# Patient Record
Sex: MALE | Race: WHITE | NOT HISPANIC OR LATINO | URBAN - METROPOLITAN AREA
[De-identification: names, ages, dates, MRNs, and addresses within clinical notes are randomized per-mention and may not be internally consistent; named-entity substitution may affect disease eponyms.]

---

## 2023-02-21 PROBLEM — F32.9 MAJOR DEPRESSION: Status: ACTIVE | Noted: 2023-02-21

## 2023-02-21 PROBLEM — I49.9 ARRHYTHMIA: Status: ACTIVE | Noted: 2023-02-21

## 2023-02-21 PROBLEM — I47.19 AVNRT (AV NODAL RE-ENTRY TACHYCARDIA) (CMS-HCC): Status: ACTIVE | Noted: 2023-02-21

## 2023-02-21 PROBLEM — R07.9 CHEST PAIN: Status: ACTIVE | Noted: 2023-02-21

## 2023-02-21 PROBLEM — E21.3 HYPERPARATHYROIDISM (MULTI): Status: ACTIVE | Noted: 2023-02-21

## 2023-02-21 PROBLEM — E83.52 HIGH CALCIUM LEVELS: Status: ACTIVE | Noted: 2023-02-21

## 2023-02-21 PROBLEM — E66.9 OBESITY: Status: ACTIVE | Noted: 2023-02-21

## 2023-02-21 PROBLEM — I50.20 SYSTOLIC HEART FAILURE (MULTI): Status: ACTIVE | Noted: 2023-02-21

## 2023-02-21 RX ORDER — ALENDRONATE SODIUM 70 MG/1
70 TABLET ORAL
COMMUNITY
End: 2023-04-05 | Stop reason: SDUPTHER

## 2023-02-21 RX ORDER — VENLAFAXINE HYDROCHLORIDE 150 MG/1
1 CAPSULE, EXTENDED RELEASE ORAL DAILY
COMMUNITY
Start: 2022-10-23 | End: 2023-04-05 | Stop reason: ALTCHOICE

## 2023-02-21 RX ORDER — ASPIRIN 81 MG/1
1 TABLET ORAL DAILY
Status: ON HOLD | COMMUNITY
Start: 2022-10-07 | End: 2023-11-06

## 2023-02-21 RX ORDER — ACETAMINOPHEN 500 MG
1 TABLET ORAL DAILY
COMMUNITY
Start: 2022-11-22

## 2023-02-21 RX ORDER — METOPROLOL TARTRATE 25 MG/1
1 TABLET, FILM COATED ORAL 2 TIMES DAILY
Status: ON HOLD | COMMUNITY
Start: 2022-10-07 | End: 2023-11-06

## 2023-02-21 RX ORDER — VENLAFAXINE HYDROCHLORIDE 75 MG/1
CAPSULE, EXTENDED RELEASE ORAL
COMMUNITY
Start: 2022-10-23 | End: 2023-04-05 | Stop reason: SDUPTHER

## 2023-02-21 RX ORDER — SACUBITRIL AND VALSARTAN 24; 26 MG/1; MG/1
1 TABLET, FILM COATED ORAL 2 TIMES DAILY
COMMUNITY
Start: 2022-10-27 | End: 2023-10-04

## 2023-04-05 ENCOUNTER — OFFICE VISIT (OUTPATIENT)
Dept: PRIMARY CARE | Facility: CLINIC | Age: 63
End: 2023-04-05
Payer: COMMERCIAL

## 2023-04-05 VITALS
SYSTOLIC BLOOD PRESSURE: 147 MMHG | BODY MASS INDEX: 39.25 KG/M2 | WEIGHT: 314 LBS | DIASTOLIC BLOOD PRESSURE: 86 MMHG | TEMPERATURE: 97.5 F | HEART RATE: 105 BPM

## 2023-04-05 DIAGNOSIS — E21.3 HYPERPARATHYROIDISM (MULTI): Primary | ICD-10-CM

## 2023-04-05 DIAGNOSIS — F32.5 MAJOR DEPRESSIVE DISORDER WITH SINGLE EPISODE, IN FULL REMISSION (CMS-HCC): ICD-10-CM

## 2023-04-05 PROCEDURE — 1036F TOBACCO NON-USER: CPT | Performed by: INTERNAL MEDICINE

## 2023-04-05 PROCEDURE — 99213 OFFICE O/P EST LOW 20 MIN: CPT | Performed by: INTERNAL MEDICINE

## 2023-04-05 RX ORDER — ALENDRONATE SODIUM 10 MG/1
10 TABLET ORAL DAILY
Qty: 90 TABLET | Refills: 3 | Status: SHIPPED | OUTPATIENT
Start: 2023-04-05 | End: 2024-04-11 | Stop reason: SDUPTHER

## 2023-04-05 RX ORDER — VENLAFAXINE HYDROCHLORIDE 75 MG/1
75 CAPSULE, EXTENDED RELEASE ORAL DAILY
Qty: 90 CAPSULE | Refills: 3 | Status: ON HOLD | OUTPATIENT
Start: 2023-04-05 | End: 2023-11-06

## 2023-04-05 RX ORDER — VENLAFAXINE HYDROCHLORIDE 150 MG/1
150 CAPSULE, EXTENDED RELEASE ORAL DAILY
Qty: 90 CAPSULE | Refills: 3 | Status: ON HOLD | OUTPATIENT
Start: 2023-04-05 | End: 2023-11-06

## 2023-04-05 ASSESSMENT — ENCOUNTER SYMPTOMS
DEPRESSED MOOD: 0
FEVER: 0
NERVOUS/ANXIOUS: 0
POLYDIPSIA: 0
COUGH: 0
DECREASED CONCENTRATION: 0
CHILLS: 0
PALPITATIONS: 0
HYPERSOMNIA: 0
ANHEDONIA: 0
SLEEP QUALITY: GOOD
SHORTNESS OF BREATH: 0
DEPRESSION: 1

## 2023-04-05 NOTE — PROGRESS NOTES
Subjective   Patient ID: Parviz Barfield is a 62 y.o. male who presents for No chief complaint on file..    Admits to missing doses of alendronate because of weekly dosing. Would like to switch to daily for compliance. Recent testing reviewed. Calcium levels normal currently. Well controlled hyperparathyroid.     Depression well controlled.     Cardiac ablation completed for AVNRT.         Depression  Visit Type: follow-up  Patient is not experiencing: anhedonia, decreased concentration, depressed mood, hypersomnia, nervousness/anxiety, palpitations and shortness of breath.    Sleep quality: good  Nighttime awakenings: none  Compliance with medications:  %         Review of Systems   Constitutional:  Negative for chills and fever.   Respiratory:  Negative for cough and shortness of breath.    Cardiovascular:  Negative for chest pain and palpitations.   Endocrine: Negative for polydipsia and polyuria.   Psychiatric/Behavioral:  Positive for depression. Negative for decreased concentration. The patient is not nervous/anxious.        Objective   /86 (BP Location: Left arm, Patient Position: Sitting)   Pulse 105   Temp 36.4 °C (97.5 °F)   Wt (!) 142 kg (314 lb)   BMI 39.25 kg/m²     Physical Exam  Constitutional:       Appearance: Normal appearance.   HENT:      Head: Normocephalic and atraumatic.   Eyes:      Extraocular Movements: Extraocular movements intact.      Pupils: Pupils are equal, round, and reactive to light.   Neck:      Thyroid: No thyroid mass or thyromegaly.      Vascular: No carotid bruit.   Cardiovascular:      Rate and Rhythm: Normal rate and regular rhythm.      Heart sounds: No murmur heard.     No friction rub. No gallop.   Pulmonary:      Effort: No respiratory distress.      Breath sounds: No wheezing, rhonchi or rales.   Musculoskeletal:      Cervical back: Neck supple.      Right lower leg: No edema.      Left lower leg: No edema.   Lymphadenopathy:      Cervical: No cervical  adenopathy.   Neurological:      Mental Status: He is alert.         Assessment/Plan

## 2023-10-04 DIAGNOSIS — I50.22 CHRONIC SYSTOLIC HEART FAILURE (MULTI): Primary | ICD-10-CM

## 2023-10-04 RX ORDER — SACUBITRIL AND VALSARTAN 24; 26 MG/1; MG/1
1 TABLET, FILM COATED ORAL 2 TIMES DAILY
Qty: 60 TABLET | Refills: 6 | Status: SHIPPED | OUTPATIENT
Start: 2023-10-04

## 2023-10-05 ASSESSMENT — PROMIS GLOBAL HEALTH SCALE
EMOTIONAL_PROBLEMS: ALWAYS
RATE_QUALITY_OF_LIFE: GOOD
RATE_AVERAGE_FATIGUE: MODERATE
RATE_AVERAGE_PAIN: 9
CARRYOUT_SOCIAL_ACTIVITIES: FAIR
RATE_MENTAL_HEALTH: FAIR
CARRYOUT_PHYSICAL_ACTIVITIES: MODERATELY
RATE_GENERAL_HEALTH: GOOD
RATE_SOCIAL_SATISFACTION: POOR
RATE_PHYSICAL_HEALTH: FAIR

## 2023-10-06 ENCOUNTER — OFFICE VISIT (OUTPATIENT)
Dept: PRIMARY CARE | Facility: CLINIC | Age: 63
End: 2023-10-06
Payer: COMMERCIAL

## 2023-10-06 VITALS
SYSTOLIC BLOOD PRESSURE: 123 MMHG | DIASTOLIC BLOOD PRESSURE: 81 MMHG | HEIGHT: 75 IN | WEIGHT: 308.2 LBS | TEMPERATURE: 96.6 F | BODY MASS INDEX: 38.32 KG/M2 | HEART RATE: 71 BPM

## 2023-10-06 DIAGNOSIS — Z00.00 ANNUAL PHYSICAL EXAM: Primary | ICD-10-CM

## 2023-10-06 DIAGNOSIS — F32.5 MAJOR DEPRESSION IN REMISSION (CMS-HCC): ICD-10-CM

## 2023-10-06 DIAGNOSIS — R23.2 ABNORMAL FLUSHING AND SWEATING: ICD-10-CM

## 2023-10-06 DIAGNOSIS — I50.22 CHRONIC SYSTOLIC HEART FAILURE (MULTI): ICD-10-CM

## 2023-10-06 DIAGNOSIS — Z12.11 ENCOUNTER FOR SCREENING FOR MALIGNANT NEOPLASM OF COLON: ICD-10-CM

## 2023-10-06 DIAGNOSIS — E21.3 HYPERPARATHYROIDISM (MULTI): ICD-10-CM

## 2023-10-06 DIAGNOSIS — I10 HTN (HYPERTENSION), BENIGN: ICD-10-CM

## 2023-10-06 DIAGNOSIS — R61 ABNORMAL FLUSHING AND SWEATING: ICD-10-CM

## 2023-10-06 DIAGNOSIS — Z12.5 SCREENING FOR PROSTATE CANCER: ICD-10-CM

## 2023-10-06 PROBLEM — E78.5 HYPERLIPIDEMIA LDL GOAL <130: Status: ACTIVE | Noted: 2023-10-06

## 2023-10-06 PROBLEM — I51.9 HEART DISEASE: Status: ACTIVE | Noted: 2023-02-21

## 2023-10-06 PROBLEM — G89.29 OTHER CHRONIC PAIN: Status: ACTIVE | Noted: 2023-10-06

## 2023-10-06 PROBLEM — E55.9 VITAMIN D DEFICIENCY: Status: ACTIVE | Noted: 2023-10-06

## 2023-10-06 PROCEDURE — 3074F SYST BP LT 130 MM HG: CPT | Performed by: INTERNAL MEDICINE

## 2023-10-06 PROCEDURE — 1036F TOBACCO NON-USER: CPT | Performed by: INTERNAL MEDICINE

## 2023-10-06 PROCEDURE — 99396 PREV VISIT EST AGE 40-64: CPT | Performed by: INTERNAL MEDICINE

## 2023-10-06 PROCEDURE — 3079F DIAST BP 80-89 MM HG: CPT | Performed by: INTERNAL MEDICINE

## 2023-10-06 ASSESSMENT — ENCOUNTER SYMPTOMS
COUGH: 0
FEVER: 0
CHILLS: 0
SHORTNESS OF BREATH: 0
PALPITATIONS: 0
POLYDIPSIA: 0

## 2023-10-06 ASSESSMENT — PATIENT HEALTH QUESTIONNAIRE - PHQ9
2. FEELING DOWN, DEPRESSED OR HOPELESS: NEARLY EVERY DAY
5. POOR APPETITE OR OVEREATING: NOT AT ALL
9. THOUGHTS THAT YOU WOULD BE BETTER OFF DEAD, OR OF HURTING YOURSELF: SEVERAL DAYS
6. FEELING BAD ABOUT YOURSELF - OR THAT YOU ARE A FAILURE OR HAVE LET YOURSELF OR YOUR FAMILY DOWN: NEARLY EVERY DAY
3. TROUBLE FALLING OR STAYING ASLEEP OR SLEEPING TOO MUCH: MORE THAN HALF THE DAYS
SUM OF ALL RESPONSES TO PHQ QUESTIONS 1-9: 15
4. FEELING TIRED OR HAVING LITTLE ENERGY: MORE THAN HALF THE DAYS
8. MOVING OR SPEAKING SO SLOWLY THAT OTHER PEOPLE COULD HAVE NOTICED. OR THE OPPOSITE, BEING SO FIGETY OR RESTLESS THAT YOU HAVE BEEN MOVING AROUND A LOT MORE THAN USUAL: NOT AT ALL
1. LITTLE INTEREST OR PLEASURE IN DOING THINGS: MORE THAN HALF THE DAYS
7. TROUBLE CONCENTRATING ON THINGS, SUCH AS READING THE NEWSPAPER OR WATCHING TELEVISION: MORE THAN HALF THE DAYS
SUM OF ALL RESPONSES TO PHQ9 QUESTIONS 1 AND 2: 5
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
## 2023-11-04 DIAGNOSIS — I47.19 AVNRT (AV NODAL RE-ENTRY TACHYCARDIA) (CMS-HCC): Primary | ICD-10-CM

## 2023-11-04 DIAGNOSIS — F32.5 MAJOR DEPRESSIVE DISORDER WITH SINGLE EPISODE, IN FULL REMISSION (CMS-HCC): ICD-10-CM

## 2023-11-06 ENCOUNTER — APPOINTMENT (OUTPATIENT)
Dept: RADIOLOGY | Facility: HOSPITAL | Age: 63
End: 2023-11-06
Payer: COMMERCIAL

## 2023-11-06 ENCOUNTER — HOSPITAL ENCOUNTER (OUTPATIENT)
Facility: HOSPITAL | Age: 63
Setting detail: OBSERVATION
Discharge: HOME | End: 2023-11-07
Attending: STUDENT IN AN ORGANIZED HEALTH CARE EDUCATION/TRAINING PROGRAM | Admitting: INTERNAL MEDICINE
Payer: COMMERCIAL

## 2023-11-06 DIAGNOSIS — F32.5 MAJOR DEPRESSIVE DISORDER WITH SINGLE EPISODE, IN FULL REMISSION (CMS-HCC): ICD-10-CM

## 2023-11-06 DIAGNOSIS — I47.19 AVNRT (AV NODAL RE-ENTRY TACHYCARDIA) (CMS-HCC): ICD-10-CM

## 2023-11-06 DIAGNOSIS — R55 SYNCOPE AND COLLAPSE: ICD-10-CM

## 2023-11-06 DIAGNOSIS — I50.9 HEART FAILURE, UNSPECIFIED (MULTI): ICD-10-CM

## 2023-11-06 DIAGNOSIS — R53.1 WEAKNESS GENERALIZED: Primary | ICD-10-CM

## 2023-11-06 DIAGNOSIS — I50.23 ACUTE ON CHRONIC SYSTOLIC HEART FAILURE (MULTI): ICD-10-CM

## 2023-11-06 LAB
ALBUMIN SERPL-MCNC: 3.5 G/DL (ref 3.5–5)
ALP BLD-CCNC: 93 U/L (ref 35–125)
ALT SERPL-CCNC: 23 U/L (ref 5–40)
ANION GAP SERPL CALC-SCNC: 12 MMOL/L
AST SERPL-CCNC: 25 U/L (ref 5–40)
BASOPHILS # BLD AUTO: 0.05 X10*3/UL (ref 0–0.1)
BASOPHILS NFR BLD AUTO: 0.8 %
BILIRUB SERPL-MCNC: 0.2 MG/DL (ref 0.1–1.2)
BUN SERPL-MCNC: 14 MG/DL (ref 8–25)
CALCIUM SERPL-MCNC: 10.2 MG/DL (ref 8.5–10.4)
CHLORIDE SERPL-SCNC: 105 MMOL/L (ref 97–107)
CO2 SERPL-SCNC: 21 MMOL/L (ref 24–31)
CREAT SERPL-MCNC: 1.2 MG/DL (ref 0.4–1.6)
EOSINOPHIL # BLD AUTO: 0.4 X10*3/UL (ref 0–0.7)
EOSINOPHIL NFR BLD AUTO: 6.7 %
ERYTHROCYTE [DISTWIDTH] IN BLOOD BY AUTOMATED COUNT: 13 % (ref 11.5–14.5)
EST. AVERAGE GLUCOSE BLD GHB EST-MCNC: 120 MG/DL
GFR SERPL CREATININE-BSD FRML MDRD: 68 ML/MIN/1.73M*2
GLUCOSE SERPL-MCNC: 120 MG/DL (ref 65–99)
HBA1C MFR BLD: 5.8 %
HCT VFR BLD AUTO: 48.3 % (ref 41–52)
HGB BLD-MCNC: 15.4 G/DL (ref 13.5–17.5)
IMM GRANULOCYTES # BLD AUTO: 0.01 X10*3/UL (ref 0–0.7)
IMM GRANULOCYTES NFR BLD AUTO: 0.2 % (ref 0–0.9)
LYMPHOCYTES # BLD AUTO: 1.76 X10*3/UL (ref 1.2–4.8)
LYMPHOCYTES NFR BLD AUTO: 29.4 %
MCH RBC QN AUTO: 27.5 PG (ref 26–34)
MCHC RBC AUTO-ENTMCNC: 31.9 G/DL (ref 32–36)
MCV RBC AUTO: 86 FL (ref 80–100)
MONOCYTES # BLD AUTO: 0.51 X10*3/UL (ref 0.1–1)
MONOCYTES NFR BLD AUTO: 8.5 %
NEUTROPHILS # BLD AUTO: 3.25 X10*3/UL (ref 1.2–7.7)
NEUTROPHILS NFR BLD AUTO: 54.4 %
NRBC BLD-RTO: 0 /100 WBCS (ref 0–0)
NT-PROBNP SERPL-MCNC: 57 PG/ML (ref 0–177)
PLATELET # BLD AUTO: 260 X10*3/UL (ref 150–450)
POTASSIUM SERPL-SCNC: 4.6 MMOL/L (ref 3.4–5.1)
PROT SERPL-MCNC: 7.5 G/DL (ref 5.9–7.9)
RBC # BLD AUTO: 5.6 X10*6/UL (ref 4.5–5.9)
SODIUM SERPL-SCNC: 138 MMOL/L (ref 133–145)
TROPONIN T SERPL-MCNC: 16 NG/L
TROPONIN T SERPL-MCNC: 17 NG/L
WBC # BLD AUTO: 6 X10*3/UL (ref 4.4–11.3)

## 2023-11-06 PROCEDURE — 84484 ASSAY OF TROPONIN QUANT: CPT

## 2023-11-06 PROCEDURE — 85025 COMPLETE CBC W/AUTO DIFF WBC: CPT | Performed by: STUDENT IN AN ORGANIZED HEALTH CARE EDUCATION/TRAINING PROGRAM

## 2023-11-06 PROCEDURE — 83036 HEMOGLOBIN GLYCOSYLATED A1C: CPT | Performed by: NURSE PRACTITIONER

## 2023-11-06 PROCEDURE — 2550000001 HC RX 255 CONTRASTS: Performed by: INTERNAL MEDICINE

## 2023-11-06 PROCEDURE — 94760 N-INVAS EAR/PLS OXIMETRY 1: CPT

## 2023-11-06 PROCEDURE — 83880 ASSAY OF NATRIURETIC PEPTIDE: CPT | Performed by: NURSE PRACTITIONER

## 2023-11-06 PROCEDURE — 99285 EMERGENCY DEPT VISIT HI MDM: CPT | Mod: 25 | Performed by: STUDENT IN AN ORGANIZED HEALTH CARE EDUCATION/TRAINING PROGRAM

## 2023-11-06 PROCEDURE — G0378 HOSPITAL OBSERVATION PER HR: HCPCS

## 2023-11-06 PROCEDURE — 99222 1ST HOSP IP/OBS MODERATE 55: CPT | Performed by: NURSE PRACTITIONER

## 2023-11-06 PROCEDURE — 71275 CT ANGIOGRAPHY CHEST: CPT

## 2023-11-06 PROCEDURE — 80053 COMPREHEN METABOLIC PANEL: CPT | Performed by: STUDENT IN AN ORGANIZED HEALTH CARE EDUCATION/TRAINING PROGRAM

## 2023-11-06 PROCEDURE — 36415 COLL VENOUS BLD VENIPUNCTURE: CPT

## 2023-11-06 PROCEDURE — 36415 COLL VENOUS BLD VENIPUNCTURE: CPT | Performed by: STUDENT IN AN ORGANIZED HEALTH CARE EDUCATION/TRAINING PROGRAM

## 2023-11-06 PROCEDURE — 70450 CT HEAD/BRAIN W/O DYE: CPT

## 2023-11-06 PROCEDURE — 84484 ASSAY OF TROPONIN QUANT: CPT | Performed by: STUDENT IN AN ORGANIZED HEALTH CARE EDUCATION/TRAINING PROGRAM

## 2023-11-06 RX ORDER — NAPROXEN SODIUM 220 MG/1
81 TABLET, FILM COATED ORAL DAILY
Status: DISCONTINUED | OUTPATIENT
Start: 2023-11-06 | End: 2023-11-07 | Stop reason: HOSPADM

## 2023-11-06 RX ORDER — VENLAFAXINE HYDROCHLORIDE 75 MG/1
75 CAPSULE, EXTENDED RELEASE ORAL
Qty: 90 CAPSULE | Refills: 2 | Status: SHIPPED
Start: 2023-11-06 | End: 2023-11-07 | Stop reason: SDUPTHER

## 2023-11-06 RX ORDER — VENLAFAXINE HYDROCHLORIDE 75 MG/1
75 CAPSULE, EXTENDED RELEASE ORAL DAILY
Status: DISCONTINUED | OUTPATIENT
Start: 2023-11-06 | End: 2023-11-07 | Stop reason: HOSPADM

## 2023-11-06 RX ORDER — ASPIRIN 81 MG/1
81 TABLET ORAL DAILY
Qty: 90 TABLET | Refills: 3 | Status: SHIPPED
Start: 2023-11-06 | End: 2023-11-07 | Stop reason: SDUPTHER

## 2023-11-06 RX ORDER — ALENDRONATE SODIUM 10 MG/1
10 TABLET ORAL DAILY
Status: DISCONTINUED | OUTPATIENT
Start: 2023-11-06 | End: 2023-11-06

## 2023-11-06 RX ORDER — ACETAMINOPHEN 325 MG/1
650 TABLET ORAL EVERY 6 HOURS PRN
Status: DISCONTINUED | OUTPATIENT
Start: 2023-11-06 | End: 2023-11-07 | Stop reason: HOSPADM

## 2023-11-06 RX ORDER — AMINOPHYLLINE 25 MG/ML
125 INJECTION, SOLUTION INTRAVENOUS AS NEEDED
Status: CANCELLED | OUTPATIENT
Start: 2023-11-06

## 2023-11-06 RX ORDER — POLYETHYLENE GLYCOL 3350 17 G/17G
17 POWDER, FOR SOLUTION ORAL DAILY PRN
Status: DISCONTINUED | OUTPATIENT
Start: 2023-11-06 | End: 2023-11-07 | Stop reason: HOSPADM

## 2023-11-06 RX ORDER — METOPROLOL TARTRATE 25 MG/1
25 TABLET, FILM COATED ORAL 2 TIMES DAILY
Status: DISCONTINUED | OUTPATIENT
Start: 2023-11-06 | End: 2023-11-07 | Stop reason: HOSPADM

## 2023-11-06 RX ORDER — METOPROLOL TARTRATE 25 MG/1
25 TABLET, FILM COATED ORAL 2 TIMES DAILY
Qty: 180 TABLET | Refills: 3 | Status: SHIPPED
Start: 2023-11-06 | End: 2023-11-07 | Stop reason: SDUPTHER

## 2023-11-06 RX ORDER — ATORVASTATIN CALCIUM 40 MG/1
40 TABLET, FILM COATED ORAL NIGHTLY
Status: DISCONTINUED | OUTPATIENT
Start: 2023-11-06 | End: 2023-11-07 | Stop reason: HOSPADM

## 2023-11-06 RX ORDER — REGADENOSON 0.08 MG/ML
0.4 INJECTION, SOLUTION INTRAVENOUS
Status: CANCELLED | OUTPATIENT
Start: 2023-11-06

## 2023-11-06 RX ORDER — NITROGLYCERIN 0.4 MG/1
0.4 TABLET SUBLINGUAL EVERY 5 MIN PRN
Status: DISCONTINUED | OUTPATIENT
Start: 2023-11-06 | End: 2023-11-07 | Stop reason: HOSPADM

## 2023-11-06 RX ORDER — ASPIRIN 81 MG/1
81 TABLET ORAL DAILY
Status: DISCONTINUED | OUTPATIENT
Start: 2023-11-06 | End: 2023-11-06

## 2023-11-06 RX ORDER — VENLAFAXINE HYDROCHLORIDE 150 MG/1
150 CAPSULE, EXTENDED RELEASE ORAL DAILY
Qty: 90 CAPSULE | Refills: 2 | Status: SHIPPED
Start: 2023-11-06 | End: 2023-11-07 | Stop reason: SDUPTHER

## 2023-11-06 RX ORDER — VENLAFAXINE HYDROCHLORIDE 150 MG/1
150 CAPSULE, EXTENDED RELEASE ORAL DAILY
Status: DISCONTINUED | OUTPATIENT
Start: 2023-11-06 | End: 2023-11-07 | Stop reason: HOSPADM

## 2023-11-06 RX ADMIN — IOHEXOL 75 ML: 350 INJECTION, SOLUTION INTRAVENOUS at 18:05

## 2023-11-06 SDOH — SOCIAL STABILITY: SOCIAL INSECURITY: WERE YOU ABLE TO COMPLETE ALL THE BEHAVIORAL HEALTH SCREENINGS?: YES

## 2023-11-06 SDOH — SOCIAL STABILITY: SOCIAL INSECURITY: HAS ANYONE EVER THREATENED TO HURT YOUR FAMILY OR YOUR PETS?: NO

## 2023-11-06 SDOH — SOCIAL STABILITY: SOCIAL INSECURITY: DO YOU FEEL UNSAFE GOING BACK TO THE PLACE WHERE YOU ARE LIVING?: NO

## 2023-11-06 SDOH — SOCIAL STABILITY: SOCIAL INSECURITY: DO YOU FEEL ANYONE HAS EXPLOITED OR TAKEN ADVANTAGE OF YOU FINANCIALLY OR OF YOUR PERSONAL PROPERTY?: NO

## 2023-11-06 SDOH — HEALTH STABILITY: MENTAL HEALTH: HOW OFTEN DO YOU HAVE 6 OR MORE DRINKS ON ONE OCCASION?: NEVER

## 2023-11-06 SDOH — SOCIAL STABILITY: SOCIAL INSECURITY: DOES ANYONE TRY TO KEEP YOU FROM HAVING/CONTACTING OTHER FRIENDS OR DOING THINGS OUTSIDE YOUR HOME?: NO

## 2023-11-06 SDOH — HEALTH STABILITY: MENTAL HEALTH: HOW OFTEN DO YOU HAVE A DRINK CONTAINING ALCOHOL?: NEVER

## 2023-11-06 SDOH — ECONOMIC STABILITY: INCOME INSECURITY: IN THE PAST 12 MONTHS, HAS THE ELECTRIC, GAS, OIL, OR WATER COMPANY THREATENED TO SHUT OFF SERVICE IN YOUR HOME?: NO

## 2023-11-06 SDOH — SOCIAL STABILITY: SOCIAL INSECURITY: ABUSE: ADULT

## 2023-11-06 SDOH — SOCIAL STABILITY: SOCIAL INSECURITY: ARE THERE ANY APPARENT SIGNS OF INJURIES/BEHAVIORS THAT COULD BE RELATED TO ABUSE/NEGLECT?: NO

## 2023-11-06 SDOH — SOCIAL STABILITY: SOCIAL INSECURITY: ARE YOU OR HAVE YOU BEEN THREATENED OR ABUSED PHYSICALLY, EMOTIONALLY, OR SEXUALLY BY ANYONE?: NO

## 2023-11-06 SDOH — HEALTH STABILITY: MENTAL HEALTH: HOW MANY STANDARD DRINKS CONTAINING ALCOHOL DO YOU HAVE ON A TYPICAL DAY?: PATIENT DOES NOT DRINK

## 2023-11-06 SDOH — SOCIAL STABILITY: SOCIAL INSECURITY: HAVE YOU HAD THOUGHTS OF HARMING ANYONE ELSE?: NO

## 2023-11-06 ASSESSMENT — ENCOUNTER SYMPTOMS
NAUSEA: 1
FACIAL ASYMMETRY: 0
DIZZINESS: 1
FATIGUE: 0
PALPITATIONS: 0
APPETITE CHANGE: 0
UNEXPECTED WEIGHT CHANGE: 0
LIGHT-HEADEDNESS: 1
AGITATION: 0
CHILLS: 0
HEADACHES: 0
APNEA: 0
SEIZURES: 0
DIFFICULTY URINATING: 0
SHORTNESS OF BREATH: 0
FEVER: 0
TROUBLE SWALLOWING: 0
ACTIVITY CHANGE: 0
WEAKNESS: 1
FACIAL SWELLING: 0
CHEST TIGHTNESS: 0

## 2023-11-06 ASSESSMENT — LIFESTYLE VARIABLES
AUDIT-C TOTAL SCORE: 0
HOW OFTEN DO YOU HAVE A DRINK CONTAINING ALCOHOL: NEVER
HOW MANY STANDARD DRINKS CONTAINING ALCOHOL DO YOU HAVE ON A TYPICAL DAY: PATIENT DOES NOT DRINK
SKIP TO QUESTIONS 9-10: 1
AUDIT-C TOTAL SCORE: 0
AUDIT-C TOTAL SCORE: 0
SKIP TO QUESTIONS 9-10: 1
SUBSTANCE_ABUSE_PAST_12_MONTHS: NO
HOW OFTEN DO YOU HAVE 6 OR MORE DRINKS ON ONE OCCASION: NEVER
HOW OFTEN DO YOU HAVE A DRINK CONTAINING ALCOHOL: NEVER
AUDIT-C TOTAL SCORE: 0
AUDIT-C TOTAL SCORE: 0
HOW OFTEN DO YOU HAVE 6 OR MORE DRINKS ON ONE OCCASION: NEVER
HOW MANY STANDARD DRINKS CONTAINING ALCOHOL DO YOU HAVE ON A TYPICAL DAY: PATIENT DOES NOT DRINK
PRESCIPTION_ABUSE_PAST_12_MONTHS: NO
SKIP TO QUESTIONS 9-10: 1

## 2023-11-06 ASSESSMENT — COGNITIVE AND FUNCTIONAL STATUS - GENERAL: PATIENT BASELINE BEDBOUND: YES

## 2023-11-06 ASSESSMENT — PATIENT HEALTH QUESTIONNAIRE - PHQ9
SUM OF ALL RESPONSES TO PHQ9 QUESTIONS 1 & 2: 0
2. FEELING DOWN, DEPRESSED OR HOPELESS: NOT AT ALL
1. LITTLE INTEREST OR PLEASURE IN DOING THINGS: NOT AT ALL
2. FEELING DOWN, DEPRESSED OR HOPELESS: NOT AT ALL
1. LITTLE INTEREST OR PLEASURE IN DOING THINGS: NOT AT ALL
SUM OF ALL RESPONSES TO PHQ9 QUESTIONS 1 & 2: 0

## 2023-11-06 ASSESSMENT — ACTIVITIES OF DAILY LIVING (ADL)
JUDGMENT_ADEQUATE_SAFELY_COMPLETE_DAILY_ACTIVITIES: YES
ADEQUATE_TO_COMPLETE_ADL: YES
LACK_OF_TRANSPORTATION: NO
HEARING - RIGHT EAR: FUNCTIONAL
TOILETING: INDEPENDENT
BATHING: INDEPENDENT
FEEDING YOURSELF: INDEPENDENT
DRESSING YOURSELF: INDEPENDENT
HEARING - LEFT EAR: FUNCTIONAL
PATIENT'S MEMORY ADEQUATE TO SAFELY COMPLETE DAILY ACTIVITIES?: YES
GROOMING: INDEPENDENT
WALKS IN HOME: INDEPENDENT

## 2023-11-06 ASSESSMENT — COLUMBIA-SUICIDE SEVERITY RATING SCALE - C-SSRS
1. IN THE PAST MONTH, HAVE YOU WISHED YOU WERE DEAD OR WISHED YOU COULD GO TO SLEEP AND NOT WAKE UP?: NO
6. HAVE YOU EVER DONE ANYTHING, STARTED TO DO ANYTHING, OR PREPARED TO DO ANYTHING TO END YOUR LIFE?: NO
2. HAVE YOU ACTUALLY HAD ANY THOUGHTS OF KILLING YOURSELF?: NO

## 2023-11-06 ASSESSMENT — PAIN SCALES - GENERAL
PAINLEVEL_OUTOF10: 0 - NO PAIN

## 2023-11-06 ASSESSMENT — PAIN - FUNCTIONAL ASSESSMENT
PAIN_FUNCTIONAL_ASSESSMENT: 0-10
PAIN_FUNCTIONAL_ASSESSMENT: 0-10

## 2023-11-06 ASSESSMENT — PAIN DESCRIPTION - PROGRESSION: CLINICAL_PROGRESSION: RESOLVED

## 2023-11-06 ASSESSMENT — PAIN DESCRIPTION - LOCATION: LOCATION: HEAD

## 2023-11-06 NOTE — NURSING NOTE
Per er nurse, pt took all home meds  today while in er   Xeljanz Counseling: I discussed with the patient the risks of Xeljanz therapy including increased risk of infection, liver issues, headache, diarrhea, or cold symptoms. Live vaccines should be avoided. They were instructed to call if they have any problems.

## 2023-11-06 NOTE — ED PROVIDER NOTES
"HPI   Chief Complaint   Patient presents with    Syncope     Syncope episode, fell in shower this morning at 0630.  Passed ot after feeling dizzy.  Brought in by [facility]       Brandin Barfield is a 63 y.o. male with history of SVT, CHF, anxiety who presents with generalized weakness. Pt states he fell in shower this morning at 0630 after feeling dizzy and lightheaded and feeling a \"flip\" in his stomach.  Pt states he lost control of his legs after this which made him fall, but does not endorse LOC. Pt did not hit his head or suffer any injuries from the fall. Brought in by [facility]. Patient took nothing for this prior to arrival. Pt states this happened again in the ER waiting room, where he lost control of his legs, but was able to fall into the chair. Pt states this has not happened prior to today. Pt denies chest pain, SOB, back pain, fever, chills, recent illness, LE swelling. Pt did undergo a cardiac cath in March of this year which showed no obstruction.         History provided by:  Patient   used: No                        No data recorded                Patient History   Past Medical History:   Diagnosis Date    Anxiety 1990    Arthritis 2020    CHF (congestive heart failure) (CMS/[facility]) 10/22    Depression 1990    Eczema During sánchez    Headache 1980     Past Surgical History:   Procedure Laterality Date    CARDIAC CATHETERIZATION  03/23    MR HEAD ANGIO WO IV CONTRAST  06/21/2017    MR HEAD ANGIO WO IV CONTRAST 6/21/2017 [facility]    MR NECK ANGIO WO IV CONTRAST  06/21/2017    MR NECK ANGIO WO IV CONTRAST 6/21/2017 [facility]    OTHER SURGICAL HISTORY  11/07/2022    No history of surgery     Family History   Problem Relation Name Age of Onset    Coronary artery disease Father Fernando Lottkezia Barfield     Heart disease Father Fernando Schwartz Lico     Coronary artery disease Brother Fidel Fernando Barfield     Drug abuse Brother Fidel King " [place]     Depression Mother Betsy Urbanoct     Hypertension Mother Betsy Urbanoct     Mental illness Mother Betsy Barfield      Social History     Tobacco Use    Smoking status: Former     Packs/day: 3.00     Years: 39.00     Additional pack years: 0.00     Total pack years: 117.00     Types: Cigarettes    Smokeless tobacco: Never   Substance Use Topics    Alcohol use: Not Currently     Comment: Quit in 1979    Drug use: Never       Physical Exam   ED Triage Vitals [11/06/23 0843]   Temp Heart Rate Resp BP   36.7 °C (98.1 °F) (!) 118 20 129/87      SpO2 Temp Source Heart Rate Source Patient Position   93 % Temporal Monitor Sitting      BP Location FiO2 (%)     Left arm --       Physical Exam  Constitutional:       Appearance: Normal appearance. He is obese. He is not diaphoretic.   HENT:      Head: Normocephalic and atraumatic.      Mouth/Throat:      Mouth: Mucous membranes are moist.      Pharynx: Oropharynx is clear.   Eyes:      General: No visual field deficit.     Extraocular Movements: Extraocular movements intact.      Pupils: Pupils are equal, round, and reactive to light.   Cardiovascular:      Rate and Rhythm: Normal rate and regular rhythm.      Pulses: Normal pulses.      Heart sounds: Normal heart sounds. No murmur heard.     No friction rub. No gallop.   Pulmonary:      Effort: Pulmonary effort is normal.      Breath sounds: Normal breath sounds.   Abdominal:      General: Abdomen is flat. Bowel sounds are normal. There is no distension.      Palpations: Abdomen is soft.      Tenderness: There is no abdominal tenderness. There is no guarding.   Musculoskeletal:         General: No swelling. Normal range of motion.      Cervical back: Normal range of motion and neck supple.      Right lower leg: No edema.      Left lower leg: No edema.   Skin:     General: Skin is warm and dry.      Capillary Refill: Capillary refill takes less than 2 seconds.      Coloration: Skin is not pale.  "  Neurological:      General: No focal deficit present.      Mental Status: He is alert and oriented to person, place, and time.      Cranial Nerves: No cranial nerve deficit, dysarthria or facial asymmetry.      Sensory: Sensation is intact. No sensory deficit.      Motor: Motor function is intact. No weakness.      Coordination: Coordination normal.   Psychiatric:         Mood and Affect: Mood normal.         Behavior: Behavior normal.       ED Course & MDM   ED Course as of 11/06/23 1418   Mon Nov 06, 2023   1312 Troponin T, High Sensitivity(!): 16 [JJ]   1313 Troponin T Series, High Sensitivity (0, 2HR, 6HR)(!) [JJ]      ED Course User Index  [JJ] Bozena Engle       Medical Decision Making  History obtained from: patient     Vital signs, nursing notes, current medications, past medical history, Surgical history, allergies, social history, family History were reviewed.      HPI:   Brandin Barfield is a 63 y.o. male with history of SVT, CHF, anxiety who presents with generalized weakness. Pt states he fell in shower this morning at 0630 after feeling dizzy and lightheaded and feeling a \"flip\" in his stomach.  Pt states he lost control of his legs after this which made him fall, but does not endorse LOC. Pt did not hit his head or suffer any injuries from the fall. Brought in by [facility]. Patient took nothing for this prior to arrival. Pt states this happened again in the ER waiting room, where he lost control of his legs, but was able to fall into the chair. Pt states this has not happened prior to today. Pt denies chest pain, SOB, back pain, fever, chills, recent illness, LE swelling. Pt did undergo a cardiac cath in March of this year which showed no obstruction. Pt has not recently changed any medications.     10 point ROS was reviewed and negative except Noted above in HPI.  DDX: MI, CVA, TIA, syncope, abnormal heart rhythm     EKG showed sinus tachycardia  Troponin mildly elevated at 16, awaiting 2 " hour and 6 hour  CT head revealed no intracranial abnormality        MDM Summary/considerations:  I estimate there is MODERATE risk for cardiac etiology of symptoms thus I consider admitting for cardiac monitoring reasonable. We have discussed the diagnosis and risks, and we agree with admission. Also discussed with attending physician who agrees with this disposition.    Critical Care: pt admitted to the cardiac observation unit for additional monitoring              Amount and/or Complexity of Data Reviewed  External Data Reviewed: labs, radiology and ECG.  Radiology:  Decision-making details documented in ED Course.  ECG/medicine tests:  Decision-making details documented in ED Course.        Procedure  Procedures

## 2023-11-06 NOTE — ED PROVIDER NOTES
"Patient was seen by both myself and advanced practitioner.  I performed substantive portion of the visit including all aspects of the medical decision making.  Please refer to advanced practitioner's note further workup, evaluation.    Patient is a 63-year-old male that presents emergency department for evaluation of anxiety, near syncope.  Patient states that he was showering this morning when he felt very weak after feeling his stomach do a \"flip\".  Patient states he suddenly felt very weak and lost control of his legs and arms.  He was initially unable to pick himself up.  He denies loss of consciousness and has slowly regained his strength.  He denies hitting his head and states he is not on any blood thinners.  He denies chest pain, abdominal pain, vomiting, change in bowel habits, recent illness.  Patient states he is never had this happen before.  He states he did previously have an ablation due to SVT and has not had any issues with it since then.  Patient does note that he had a similar episode again after arriving to the emergency department in the waiting room while sitting in a chair.    On exam patient resting comfortably and in no obvious distress.  He is awake, alert and oriented at this time.  He is moving all extremities equally with no obvious focal deficit.  Lungs are clear to auscultation bilaterally and respirations are easy, nonlabored.  He is regular rate and rhythm on auscultation of the heart.  EKG shows no evidence of STEMI.  Blood work was remarkable for minimally elevated troponin of 16, normal electrolytes, normal white count.  CT scan of the brain showed no evidence of intracranial hemorrhage.  Given the multiple near syncopal episodes with significant weakness and patient significant cardiac history we will plan to admit patient for observation and continued trending of his troponins.  Case was discussed with the observation unit and they accepted patient for observation.     Zach ROE" DO Adriana  11/06/23 8942

## 2023-11-07 ENCOUNTER — APPOINTMENT (OUTPATIENT)
Dept: CARDIOLOGY | Facility: HOSPITAL | Age: 63
End: 2023-11-07
Payer: COMMERCIAL

## 2023-11-07 ENCOUNTER — APPOINTMENT (OUTPATIENT)
Dept: RADIOLOGY | Facility: HOSPITAL | Age: 63
End: 2023-11-07
Payer: COMMERCIAL

## 2023-11-07 VITALS
HEART RATE: 110 BPM | WEIGHT: 310 LBS | HEIGHT: 75 IN | TEMPERATURE: 97.2 F | OXYGEN SATURATION: 97 % | DIASTOLIC BLOOD PRESSURE: 90 MMHG | BODY MASS INDEX: 38.54 KG/M2 | SYSTOLIC BLOOD PRESSURE: 115 MMHG | RESPIRATION RATE: 18 BRPM

## 2023-11-07 LAB
ANION GAP SERPL CALC-SCNC: 11 MMOL/L
AORTIC VALVE PEAK VELOCITY: 1.01
ATRIAL RATE: 114 BPM
AV PEAK GRADIENT: 4.1
BUN SERPL-MCNC: 14 MG/DL (ref 8–25)
CALCIUM SERPL-MCNC: 10.4 MG/DL (ref 8.5–10.4)
CHLORIDE SERPL-SCNC: 103 MMOL/L (ref 97–107)
CHOLEST SERPL-MCNC: 187 MG/DL (ref 133–200)
CHOLEST/HDLC SERPL: 6.2 {RATIO}
CO2 SERPL-SCNC: 24 MMOL/L (ref 24–31)
CREAT SERPL-MCNC: 1.3 MG/DL (ref 0.4–1.6)
EJECTION FRACTION APICAL 4 CHAMBER: 48.2
EJECTION FRACTION: 44
ERYTHROCYTE [DISTWIDTH] IN BLOOD BY AUTOMATED COUNT: 13 % (ref 11.5–14.5)
GFR SERPL CREATININE-BSD FRML MDRD: 62 ML/MIN/1.73M*2
GLUCOSE SERPL-MCNC: 103 MG/DL (ref 65–99)
HCT VFR BLD AUTO: 44.3 % (ref 41–52)
HDLC SERPL-MCNC: 30 MG/DL
HGB BLD-MCNC: 14.7 G/DL (ref 13.5–17.5)
LDLC SERPL CALC-MCNC: 117 MG/DL (ref 65–130)
LEFT VENTRICLE INTERNAL DIMENSION DIASTOLE: 5.18 (ref 3.5–6)
MCH RBC QN AUTO: 27.8 PG (ref 26–34)
MCHC RBC AUTO-ENTMCNC: 33.2 G/DL (ref 32–36)
MCV RBC AUTO: 84 FL (ref 80–100)
MITRAL VALVE E/A RATIO: 0.56
MITRAL VALVE E/E' RATIO: 9.86
NRBC BLD-RTO: 0 /100 WBCS (ref 0–0)
P AXIS: 15 DEGREES
P OFFSET: 190 MS
P ONSET: 147 MS
PLATELET # BLD AUTO: 254 X10*3/UL (ref 150–450)
POTASSIUM SERPL-SCNC: 4.4 MMOL/L (ref 3.4–5.1)
PR INTERVAL: 152 MS
Q ONSET: 223 MS
QRS COUNT: 18 BEATS
QRS DURATION: 92 MS
QT INTERVAL: 324 MS
QTC CALCULATION(BAZETT): 446 MS
QTC FREDERICIA: 401 MS
R AXIS: -11 DEGREES
RBC # BLD AUTO: 5.28 X10*6/UL (ref 4.5–5.9)
RIGHT VENTRICLE FREE WALL PEAK S': 10.7
SODIUM SERPL-SCNC: 138 MMOL/L (ref 133–145)
T AXIS: 83 DEGREES
T OFFSET: 385 MS
TRICUSPID ANNULAR PLANE SYSTOLIC EXCURSION: 2
TRIGL SERPL-MCNC: 200 MG/DL (ref 40–150)
VENTRICULAR RATE: 114 BPM
WBC # BLD AUTO: 7.2 X10*3/UL (ref 4.4–11.3)

## 2023-11-07 PROCEDURE — 93306 TTE W/DOPPLER COMPLETE: CPT

## 2023-11-07 PROCEDURE — 80048 BASIC METABOLIC PNL TOTAL CA: CPT

## 2023-11-07 PROCEDURE — 93017 CV STRESS TEST TRACING ONLY: CPT

## 2023-11-07 PROCEDURE — 80061 LIPID PANEL: CPT

## 2023-11-07 PROCEDURE — 85027 COMPLETE CBC AUTOMATED: CPT

## 2023-11-07 PROCEDURE — 36415 COLL VENOUS BLD VENIPUNCTURE: CPT

## 2023-11-07 PROCEDURE — 2500000001 HC RX 250 WO HCPCS SELF ADMINISTERED DRUGS (ALT 637 FOR MEDICARE OP)

## 2023-11-07 PROCEDURE — 3430000001 HC RX 343 DIAGNOSTIC RADIOPHARMACEUTICALS: Performed by: INTERNAL MEDICINE

## 2023-11-07 PROCEDURE — 93016 CV STRESS TEST SUPVJ ONLY: CPT | Performed by: INTERNAL MEDICINE

## 2023-11-07 PROCEDURE — 78452 HT MUSCLE IMAGE SPECT MULT: CPT

## 2023-11-07 PROCEDURE — 93306 TTE W/DOPPLER COMPLETE: CPT | Performed by: INTERNAL MEDICINE

## 2023-11-07 PROCEDURE — A9502 TC99M TETROFOSMIN: HCPCS | Performed by: INTERNAL MEDICINE

## 2023-11-07 PROCEDURE — 2550000001 HC RX 255 CONTRASTS: Mod: JZ | Performed by: NURSE PRACTITIONER

## 2023-11-07 PROCEDURE — 97165 OT EVAL LOW COMPLEX 30 MIN: CPT | Mod: GO

## 2023-11-07 PROCEDURE — 99238 HOSP IP/OBS DSCHRG MGMT 30/<: CPT | Performed by: NURSE PRACTITIONER

## 2023-11-07 PROCEDURE — 93005 ELECTROCARDIOGRAM TRACING: CPT

## 2023-11-07 PROCEDURE — A9502 TC99M TETROFOSMIN: HCPCS

## 2023-11-07 PROCEDURE — G0378 HOSPITAL OBSERVATION PER HR: HCPCS

## 2023-11-07 PROCEDURE — 2500000001 HC RX 250 WO HCPCS SELF ADMINISTERED DRUGS (ALT 637 FOR MEDICARE OP): Performed by: NURSE PRACTITIONER

## 2023-11-07 PROCEDURE — 3430000001 HC RX 343 DIAGNOSTIC RADIOPHARMACEUTICALS

## 2023-11-07 PROCEDURE — 2500000004 HC RX 250 GENERAL PHARMACY W/ HCPCS (ALT 636 FOR OP/ED)

## 2023-11-07 PROCEDURE — 97161 PT EVAL LOW COMPLEX 20 MIN: CPT | Mod: GP

## 2023-11-07 RX ORDER — METOPROLOL TARTRATE 25 MG/1
25 TABLET, FILM COATED ORAL 2 TIMES DAILY
Start: 2023-11-07 | End: 2024-04-17 | Stop reason: SINTOL

## 2023-11-07 RX ORDER — REGADENOSON 0.08 MG/ML
INJECTION, SOLUTION INTRAVENOUS
Status: DISCONTINUED
Start: 2023-11-07 | End: 2023-11-07 | Stop reason: HOSPADM

## 2023-11-07 RX ORDER — VENLAFAXINE HYDROCHLORIDE 150 MG/1
150 CAPSULE, EXTENDED RELEASE ORAL DAILY
Start: 2023-11-07

## 2023-11-07 RX ORDER — ASPIRIN 81 MG/1
81 TABLET ORAL DAILY
Start: 2023-11-07

## 2023-11-07 RX ORDER — VENLAFAXINE HYDROCHLORIDE 75 MG/1
75 CAPSULE, EXTENDED RELEASE ORAL
Start: 2023-11-07

## 2023-11-07 RX ADMIN — METOPROLOL TARTRATE 25 MG: 25 TABLET, FILM COATED ORAL at 10:01

## 2023-11-07 RX ADMIN — TETROFOSMIN 10 MILLICURIE: 0.23 INJECTION, POWDER, LYOPHILIZED, FOR SOLUTION INTRAVENOUS at 10:25

## 2023-11-07 RX ADMIN — SACUBITRIL AND VALSARTAN 1 TABLET: 24; 26 TABLET, FILM COATED ORAL at 10:01

## 2023-11-07 RX ADMIN — VENLAFAXINE HYDROCHLORIDE 150 MG: 150 CAPSULE, EXTENDED RELEASE ORAL at 10:03

## 2023-11-07 RX ADMIN — ASPIRIN 81 MG CHEWABLE TABLET 81 MG: 81 TABLET CHEWABLE at 10:00

## 2023-11-07 RX ADMIN — TETROFOSMIN 31.2 MILLICURIE: 0.23 INJECTION, POWDER, LYOPHILIZED, FOR SOLUTION INTRAVENOUS at 12:25

## 2023-11-07 RX ADMIN — SULFUR HEXAFLUORIDE 24.28 MG: KIT at 08:57

## 2023-11-07 RX ADMIN — VENLAFAXINE HYDROCHLORIDE 75 MG: 75 CAPSULE, EXTENDED RELEASE ORAL at 10:04

## 2023-11-07 RX ADMIN — Medication 5000 UNITS: at 10:10

## 2023-11-07 ASSESSMENT — COGNITIVE AND FUNCTIONAL STATUS - GENERAL
DAILY ACTIVITIY SCORE: 24
DAILY ACTIVITIY SCORE: 24
MOBILITY SCORE: 21
CLIMB 3 TO 5 STEPS WITH RAILING: A LOT
WALKING IN HOSPITAL ROOM: A LITTLE
MOBILITY SCORE: 24

## 2023-11-07 ASSESSMENT — PAIN SCALES - GENERAL
PAINLEVEL_OUTOF10: 3
PAINLEVEL_OUTOF10: 0 - NO PAIN
PAINLEVEL_OUTOF10: 0 - NO PAIN
PAINLEVEL_OUTOF10: 3

## 2023-11-07 ASSESSMENT — ACTIVITIES OF DAILY LIVING (ADL)
BATHING_ASSISTANCE: NOT PERFORMED
ADL_ASSISTANCE: INDEPENDENT
ADLS_ADDRESSED: NO
ADL_ASSISTANCE: INDEPENDENT

## 2023-11-07 ASSESSMENT — PAIN DESCRIPTION - DESCRIPTORS: DESCRIPTORS: DISCOMFORT

## 2023-11-07 ASSESSMENT — PAIN - FUNCTIONAL ASSESSMENT
PAIN_FUNCTIONAL_ASSESSMENT: 0-10

## 2023-11-07 NOTE — CARE PLAN
The patient's goals for the shift include      The clinical goals for the shift include pt will remain free of syncope this shift    Over the shift, the patient did not make progress toward the following goals. Barriers to progression include pt has intermittent sinus tachycardia. Recommendations to address these barriers include monitor orthostatic vitals and monitor before/after medication administrations.

## 2023-11-07 NOTE — PROGRESS NOTES
Occupational Therapy                 Therapy Communication Note    Patient Name: Brandin Barfield  MRN: [id number]  Today's Date: 11/7/2023     Discipline: Occupational Therapy    Missed Visit Reason: Missed Visit Reason: Other (Comment) (Patient leaving for echo and then stress test per nurse.)    Missed Time: Attempt    Comment:

## 2023-11-07 NOTE — NURSING NOTE
BSSR complete, assumed care of patient, alert and oriented, not in any distress, kept resting comfortably with call light in reach and bed on lowest position for safety.

## 2023-11-07 NOTE — H&P
"History Of Present Illness  Brandin Barfield is a 63 y.o. male presenting for evaluation of syncope and collapse.  Pt says that he was taking a shower this morning but he suddenly became dizzy/lightheaded.  He said that during the episode his legs gave out and he fell on the shower floor.  He said after a minute or 2 he regained his strength and was able to stand and walk to the toilet where he sat down.  He said during the episode he became nauseous and his stomach \"flipped\".  He said after sitting on the toilet for a few minutes he attempted to go up the stairs.  He felt that he was able to make it up the stairs but it took him longer than usual as he became dizzy again. He then decided to call EMS.  He said after arriving to the hospital he was placed in the lobby and while waiting to be seen he became dizzy/lightheaded and slumped over in the chair.  He said after a few minutes the episode subsided.  During these episodes he denies any chest pain/palpitations/dyspnea.  He denies LOC or hitting his head.  He has had no further episodes and feel back to his baseline.       Past Medical History  Past Medical History:   Diagnosis Date    Allergic     Anxiety 1990    Arthritis 2020    CHF (congestive heart failure) (CMS/[facility]) 10/22    Depression 1990    Eczema During sánchez    Headache 1980    Hypertension        Surgical History  Past Surgical History:   Procedure Laterality Date    CARDIAC CATHETERIZATION  03/23    MR HEAD ANGIO WO IV CONTRAST  06/21/2017    MR HEAD ANGIO WO IV CONTRAST 6/21/2017 [facility]ERGENCY [facility]GAC[facility]    MR NECK ANGIO WO IV CONTRAST  06/21/2017    MR NECK ANGIO WO IV CONTRAST 6/21/2017 [facility] EMERGENCY LEGACY    OTHER SURGICAL HISTORY  11/07/2022    No history of surgery        Social History  He reports that he has quit smoking. His smoking use included cigarettes. He has a 117.00 pack-year smoking history. He has never used smokeless tobacco. He reports that he does not currently use alcohol. He " reports that he does not use drugs.    Family History  Family History   Problem Relation Name Age of Onset    Coronary artery disease Father Fernando Barfield     Heart disease Father Fernando Barfield     Coronary artery disease Brother Fidel Barfield     Drug abuse Brother Fidel Barfield     Depression Mother Betsy Barfield     Hypertension Mother Betsy Barfield     Mental illness Mother Betsy Barfield         Allergies  Vibramycin [doxycycline calcium] and Doxycycline    Review of Systems   Constitutional:  Negative for activity change, appetite change, chills, fatigue, fever and unexpected weight change.   HENT:  Negative for congestion, facial swelling and trouble swallowing.    Respiratory:  Negative for apnea, chest tightness and shortness of breath.    Cardiovascular:  Positive for leg swelling (has mild chronic leg edema). Negative for chest pain and palpitations.   Gastrointestinal:  Positive for nausea (during synocpal events).   Genitourinary:  Negative for difficulty urinating.   Musculoskeletal:  Positive for gait problem (became weak during syncopal event).   Skin:  Negative for rash.   Neurological:  Positive for dizziness, weakness and light-headedness. Negative for seizures, facial asymmetry and headaches.   Psychiatric/Behavioral:  Negative for agitation, behavioral problems and suicidal ideas.         Physical Exam  Constitutional:       General: He is not in acute distress.     Appearance: Normal appearance. He is not ill-appearing or toxic-appearing.   Cardiovascular:      Rate and Rhythm: Normal rate and regular rhythm.      Pulses: Normal pulses.      Heart sounds: Normal heart sounds. No murmur heard.     No gallop.   Pulmonary:      Effort: Pulmonary effort is normal. No respiratory distress.      Breath sounds: Normal breath sounds. No wheezing or rales.   Abdominal:      General: Bowel sounds are normal.      Palpations: Abdomen is soft.  "  Musculoskeletal:         General: No swelling. Normal range of motion.      Cervical back: Normal range of motion.   Skin:     General: Skin is warm and dry.   Neurological:      General: No focal deficit present.      Mental Status: He is alert and oriented to person, place, and time.   Psychiatric:         Mood and Affect: Mood normal.         Behavior: Behavior normal.          Last Recorded Vitals  Blood pressure 111/90, pulse 75, temperature 36.7 °C (98.1 °F), temperature source Oral, resp. rate 17, height 1.905 m (6' 3\"), weight 141 kg (310 lb), SpO2 98 %.    Relevant Results  Scheduled medications  aspirin, 81 mg, oral, Daily  atorvastatin, 40 mg, oral, Nightly  [START ON 11/7/2023] cholecalciferol, 5,000 Units, oral, Daily  iohexol, 75 mL, intravenous, Once in imaging  iohexol, 75 mL, intravenous, Once in imaging  metoprolol tartrate, 25 mg, oral, BID  sacubitriL-valsartan, 1 tablet, oral, BID  venlafaxine XR, 150 mg, oral, Daily  venlafaxine XR, 75 mg, oral, Daily      Continuous medications     PRN medications  PRN medications: nitroglycerin     following data reviewed    WBC- 6.0  Hgb-15.4  Hct-48.3  Platelets-260    AST-25  ALT- 23  Alk Phos-93  Total John-0.2    Na-138  K+-4.6  Cl-105  Bicarb-21  BUN-14  creatinine-1.2    CTA  1. No pulmonary embolus.   2. Consolidative opacity seen in the right hilum and right upper lobe   may be related to persistent atypical/viral pneumonia. As compared to   the prior examination from December 2021 there has been significant   interval improvement in multifocal areas of ground-glass infiltrate   in both lungs. Recommend follow-up CT in 3-6 months to assess for   resolution.      CT head  No acute intracranial findings         Assessment/Plan   Principal Problem:    Generalized weakness      Syncope and collapse  -US carotids  -ECHO  -orthostatic vitals  -serial troponins  -telemetry    Fall  -fall precautions  -PT,OT    CHF (systolic)  -Cardiac " diet  -telemetry  -continue Entresto  -ECHO  -daily weight  -CHF education by nursing  -monitor I&O    HTN  -continue home meds    Depression  -continue home meds    Hyperlipidemia  -continue statin    Abnormal Blood Glucose  -check HgbA1C    Dispo  -Above plan discussed at length with pt, he is in agreement  -code status discussed and pt wishes to be full measures           I spent 45 minutes in the professional and overall care of this patient.      Octavia Pickens, APRN-CNP

## 2023-11-07 NOTE — PROGRESS NOTES
Physical Therapy    Physical Therapy Evaluation    Patient Name: Brandin Barfield  MRN: [id number]  Today's Date: 11/7/2023   Time Calculation  Start Time: 1329  Stop Time: 1340  Time Calculation (min): 11 min    Assessment/Plan   PT Assessment  PT Assessment Results: Decreased endurance, Impaired balance, Decreased mobility, Decreased safety awareness  Rehab Prognosis: Good  Evaluation/Treatment Tolerance: Patient tolerated treatment well  Medical Staff Made Aware: Yes  Strengths: Ability to acquire knowledge, Premorbid level of function  End of Session Communication: Bedside nurse  Assessment Comment: Pt demonstrates impaired functional mobility from baseline level; pt with mildly impaired balance and decreased overall activity tolerance; would benefit from an additional session of acute skilled PT to address stair negotiation.  End of Session Patient Position: Up in chair  IP OR SWING BED PT PLAN  Inpatient or Swing Bed: Inpatient  PT Plan  Treatment/Interventions: Gait training, Stair training  PT Plan: Skilled PT  PT Frequency: 3 times per week  PT Discharge Recommendations: Low intensity level of continued care  PT Recommended Transfer Status: Stand by assist  PT - OK to Discharge: Yes    Subjective   General Visit Information:  General  Reason for Referral: weakness, falls  Referred By: Dr. Karyn MD  Past Medical History Relevant to Rehab: anxiety, arthritis, CHF, depression, eczema, HTN, cardiac cath  Missed Visit: No  Missed Visit Reason: Other (Comment)  Family/Caregiver Present: No  Co-Treatment: OT  Co-Treatment Reason: eval complexity  Prior to Session Communication: Bedside nurse  Patient Position Received: Bed, 2 rail up  Preferred Learning Style: verbal  General Comment: Pt cleared for therapy via RN, received in supine, NAD, agreeable to participate in therapy. (Pt is a 63 year-old M admitted from home with c/o syncope. CT angio negative for PE; awaiting results of carotid US and stress test.)  Home  Living:  Home Living  Type of Home: House  Lives With: Significant other  Home Adaptive Equipment: None  Home Layout: Two level, Laundry in basement  Alternate Level Stairs-Rails: Right  Alternate Level Stairs-Number of Steps: 13 (to basement)  Home Access: Stairs to enter with rails  Entrance Stairs-Rails: Right  Entrance Stairs-Number of Steps: 3-4  Bathroom Shower/Tub: Walk-in shower (in basement)  Bathroom Toilet: Standard  Bathroom Equipment: None  Prior Level of Function:  Prior Function Per Pt/Caregiver Report  Level of [place]: Independent with ADLs and functional transfers, Independent with homemaking with ambulation  Receives Help From: Family  ADL Assistance: Independent  Homemaking Assistance: Independent  Ambulatory Assistance: Independent  Vocational: Full time employment (HVAC)  Hand Dominance: Right  Precautions:  Precautions  Hearing/Visual Limitations: glasses  Medical Precautions: Fall precautions    Objective   Pain:  Pain Assessment  Pain Assessment: 0-10  Pain Score: 3  Pain Type: Acute pain  Pain Location: Shoulder  Pain Orientation: Left  Pain Interventions: Repositioned  Response to Interventions: no pain with rest  Cognition:  Cognition  Overall Cognitive Status: Within Functional Limits    General Assessments:      Activity Tolerance  Endurance: Endurance does not limit participation in activity  Activity Tolerance Comments: no c/o SOB or dizziness throughout session    Sensation  Light Touch: No apparent deficits    Strength  Strength Comments: > 4/5 BLE    Coordination  Movements are Fluid and Coordinated: Yes    Postural Control  Postural Control: Within Functional Limits    Static Sitting Balance  Static Sitting-Balance Support: No upper extremity supported  Static Sitting-Level of Assistance: Independent    Static Standing Balance  Static Standing-Balance Support: No upper extremity supported  Static Standing-Level of Assistance: Close supervision  Functional  Assessments:  ADL  ADL's Addressed: No    Bed Mobility  Bed Mobility: Yes  Bed Mobility 1  Bed Mobility 1: Supine to sitting  Level of Assistance 1: Independent    Transfers  Transfer: Yes  Transfer 1  Transfer From 1: Sit to  Transfer to 1: Stand  Technique 1: Sit to stand  Transfer Level of Assistance 1: Independent  Trials/Comments 1: no c/o dizziness with transfer  Transfers 2  Transfer From 2: Stand to  Transfer to 2: Sit  Technique 2: Stand to sit  Transfer Level of Assistance 2: Independent    Ambulation/Gait Training  Ambulation/Gait Training Performed: Yes  Ambulation/Gait Training 1  Surface 1: Level tile  Device 1: No device  Assistance 1: Close supervision  Quality of Gait 1:  (normal sandra, reciprocating pattern)  Comments/Distance (ft) 1: 50' x 2    Stairs  Stairs: No     Extremity/Trunk Assessments:  RLE   RLE : Within Functional Limits  LLE   LLE : Within Functional Limits  Outcome Measures:  [facility] Basic Mobility  Turning from your back to your side while in a flat bed without using bedrails: None  Moving from lying on your back to sitting on the side of a flat bed without using bedrails: None  Moving to and from bed to chair (including a wheelchair): None  Standing up from a chair using your arms (e.g. wheelchair or bedside chair): None  To walk in hospital room: A little  Climbing 3-5 steps with railing: A lot  Basic Mobility - Total Score: 21    Encounter Problems       Encounter Problems (Active)       Mobility       STG - Patient will ambulate 300' with independence. (Progressing)       Start:  11/07/23    Expected End:  11/21/23            STG - Patient will ascend and descend a flight of stairs with use of one handrail and modified independence. (Progressing)       Start:  11/07/23    Expected End:  11/21/23               Safety       LTG - Patient will demonstrate safety requirements appropriate to situation/environment       Start:  11/07/23    Expected End:  11/21/23                  Education Documentation  Mobility Training, taught by Armida Kilgore, PT at 11/7/2023  2:21 PM.  Learner: Patient  Readiness: Acceptance  Method: Demonstration, Explanation  Response: Needs Reinforcement    Education Comments  No comments found.

## 2023-11-07 NOTE — PROGRESS NOTES
Brandin Barfield is a 63 y.o. male on day 0 of admission presenting with Generalized weakness.      Subjective   Mr. Brandin Barfield is a 63 y.o. male presented to ED for evaluation of syncope and collapse.  Patient is alert awake and oriented x3.  Patient states he felt dizzy and lightheaded when he get up to go to bathroom.  He sat on the chair for few minutes until dizziness and lightheadedness subsided prior to walk to the bathroom.  Patient denies fever, chills, chest pain, shortness of breath, and nausea/vomiting/diarrhea.       Objective     Last Recorded Vitals  /88 (BP Location: Right arm, Patient Position: Lying)   Pulse 80   Temp 36.7 °C (98.1 °F) (Oral)   Resp 18   Wt 141 kg (310 lb)   SpO2 94%   Intake/Output last 3 Shifts:  No intake or output data in the 24 hours ending 11/07/23 0828    Admission Weight  Weight: 141 kg (310 lb) (11/06/23 0843)    Daily Weight  11/06/23 : 141 kg (310 lb)    Image Results  CT angio chest for pulmonary embolism  Narrative: Interpreted By:  Kendell Beck,   STUDY:  CT ANGIO CHEST FOR PULMONARY EMBOLISM; 11/6/2023 6:11 pm      INDICATION:  Signs/Symptoms:Syncope.      COMPARISON:  12/26/2021 effusion which is is is is is is is      ACCESSION NUMBER(S):  [id number]      ORDERING CLINICIAN:  RAMIRO SHEIKH      TECHNIQUE:  Contiguous axial images of the thorax were obtained from the level of  the thoracic inlet through the lung bases. 3-D MIPS were created,  processed and reviewed on a separate workstation. 75 ml of Omnipaque  350 was utilized. All CT examinations are performed with 1 or more of  the following dose reduction techniques: Automated exposure control,  adjustment of mA and/or kv according to patient's size, or use of  iterative reconstruction techniques.      FINDINGS:  The thyroid gland is unremarkable.      There is adequate contrast opacification of the pulmonary arterial  vasculature. No filling defect or vessel cutoff to indicate  pulmonary  embolus.      The heart size is within normal limits.  No pericardial effusion is  identified. The aorta and pulmonary arteries are normal in caliber.      There are no pathologically enlarged mediastinal, hilar, or axillary  lymph nodes are seen.      The trachea and mainstem bronchi are patent. There is no evidence of  pneumothorax or pleural effusion. A consolidative opacity is seen in  the right hilum as well as the right upper lobe. There has been  interval improvement multifocal areas of ground-glass infiltrate  noted in both lungs      The visualized osseous structures are intact.      Limited images through the upper abdomen are unremarkable.      Impression: 1. No pulmonary embolus.  2. Consolidative opacity seen in the right hilum and right upper lobe  may be related to persistent atypical/viral pneumonia. As compared to  the prior examination from December 2021 there has been significant  interval improvement in multifocal areas of ground-glass infiltrate  in both lungs. Recommend follow-up CT in 3-6 months to assess for  resolution.      Signed by: Kendell Beck 11/6/2023 6:54 PM  Dictation workstation:   [id number]  CT head wo IV contrast  Narrative: Interpreted By:  Karishma Man,   STUDY:  CT HEAD WO IV CONTRAST;  11/6/2023 1:00 pm      INDICATION:  Signs/Symptoms:dizziness.      COMPARISON:  12/26/2021.      ACCESSION NUMBER(S):  [id number]      ORDERING CLINICIAN:  GERARDO LAUGHLIN      TECHNIQUE:  Noncontrast axial CT scan of head was performed. Angled reformats in  brain and bone windows were generated. The images were reviewed in  bone, brain, blood and soft tissue windows.      FINDINGS:  CSF Spaces: Ex vacuo dilation of the ventricles. The sulci and basal  cisterns are within normal limits. There is no extraaxial fluid  collection.      Parenchyma: Chronic bilateral white matter microvascular disease.  There is no mass effect or midline shift.  There is no intracranial  hemorrhage.       Calvarium: The calvarium is unremarkable.      Paranasal sinuses and mastoids: Visualized paranasal sinuses and  mastoids are clear.      Impression: No evidence of acute cortical infarct or intracranial hemorrhage.      No evidence of intracranial hemorrhage or displaced skull fracture.      MACRO:  None      Signed by: Karishma Man 11/6/2023 1:05 PM  Dictation workstation:   [id number]      Physical Exam  Vitals and nursing note reviewed.   Constitutional:       General: He is not in acute distress.     Appearance: Normal appearance. He is obese. He is not diaphoretic.   HENT:      Head: Normocephalic and atraumatic.   Cardiovascular:      Rate and Rhythm: Normal rate and regular rhythm.      Pulses: Normal pulses.      Heart sounds: Normal heart sounds.   Pulmonary:      Effort: Pulmonary effort is normal. No respiratory distress.      Breath sounds: Normal breath sounds. No wheezing or rales.   Chest:      Chest wall: No tenderness.   Abdominal:      General: Bowel sounds are normal. There is no distension.      Palpations: Abdomen is soft.      Tenderness: There is no abdominal tenderness.   Musculoskeletal:      Cervical back: Normal range of motion and neck supple. No tenderness.      Right lower leg: No edema.      Left lower leg: No edema.   Skin:     General: Skin is warm and dry.      Capillary Refill: Capillary refill takes less than 2 seconds.   Neurological:      General: No focal deficit present.      Mental Status: He is alert and oriented to person, place, and time.   Psychiatric:         Mood and Affect: Mood normal.         Behavior: Behavior normal.         Relevant Results             Scheduled medications  aspirin, 81 mg, oral, Daily  atorvastatin, 40 mg, oral, Nightly  cholecalciferol, 5,000 Units, oral, Daily  iohexol, 75 mL, intravenous, Once in imaging  iohexol, 75 mL, intravenous, Once in imaging  metoprolol tartrate, 25 mg, oral, BID  sacubitriL-valsartan, 1 tablet, oral, BID  venlafaxine  XR, 150 mg, oral, Daily  venlafaxine XR, 75 mg, oral, Daily      Results for orders placed or performed during the hospital encounter of 11/06/23 (from the past 24 hour(s))   CBC with Differential   Result Value Ref Range    WBC 6.0 4.4 - 11.3 x10*3/uL    nRBC 0.0 0.0 - 0.0 /100 WBCs    RBC 5.60 4.50 - 5.90 x10*6/uL    Hemoglobin 15.4 13.5 - 17.5 g/dL    Hematocrit 48.3 41.0 - 52.0 %    MCV 86 80 - 100 fL    MCH 27.5 26.0 - 34.0 pg    MCHC 31.9 (L) 32.0 - 36.0 g/dL    RDW 13.0 11.5 - 14.5 %    Platelets 260 150 - 450 x10*3/uL    Neutrophils % 54.4 40.0 - 80.0 %    Immature Granulocytes %, Automated 0.2 0.0 - 0.9 %    Lymphocytes % 29.4 13.0 - 44.0 %    Monocytes % 8.5 2.0 - 10.0 %    Eosinophils % 6.7 0.0 - 6.0 %    Basophils % 0.8 0.0 - 2.0 %    Neutrophils Absolute 3.25 1.20 - 7.70 x10*3/uL    Immature Granulocytes Absolute, Automated 0.01 0.00 - 0.70 x10*3/uL    Lymphocytes Absolute 1.76 1.20 - 4.80 x10*3/uL    Monocytes Absolute 0.51 0.10 - 1.00 x10*3/uL    Eosinophils Absolute 0.40 0.00 - 0.70 x10*3/uL    Basophils Absolute 0.05 0.00 - 0.10 x10*3/uL   Comprehensive Metabolic Panel   Result Value Ref Range    Glucose 120 (H) 65 - 99 mg/dL    Sodium 138 133 - 145 mmol/L    Potassium 4.6 3.4 - 5.1 mmol/L    Chloride 105 97 - 107 mmol/L    Bicarbonate 21 (L) 24 - 31 mmol/L    Urea Nitrogen 14 8 - 25 mg/dL    Creatinine 1.20 0.40 - 1.60 mg/dL    eGFR 68 >60 mL/min/1.73m*2    Calcium 10.2 8.5 - 10.4 mg/dL    Albumin 3.5 3.5 - 5.0 g/dL    Alkaline Phosphatase 93 35 - 125 U/L    Total Protein 7.5 5.9 - 7.9 g/dL    AST 25 5 - 40 U/L    Bilirubin, Total 0.2 0.1 - 1.2 mg/dL    ALT 23 5 - 40 U/L    Anion Gap 12 <=19 mmol/L   Serial Troponin, Initial ([facility])   Result Value Ref Range    Troponin T, High Sensitivity 16 (H) <=15 ng/L   Hemoglobin A1c   Result Value Ref Range    Hemoglobin A1C 5.8 (H) See below %    Estimated Average Glucose 120 Not Established mg/dL   NT Pro-BNP   Result Value Ref Range    PROBNP 57 0 - 177  pg/mL   Serial Troponin, Initial ([facility])   Result Value Ref Range    Troponin T, High Sensitivity 17 (H) <=15 ng/L   Serial Troponin, 2 Hour ([facility])   Result Value Ref Range    Troponin T, High Sensitivity 17 (H) <=15 ng/L   Serial Troponin, Initial ([facility])   Result Value Ref Range    Troponin T, High Sensitivity 17 (H) <=15 ng/L   CBC   Result Value Ref Range    WBC 7.2 4.4 - 11.3 x10*3/uL    nRBC 0.0 0.0 - 0.0 /100 WBCs    RBC 5.28 4.50 - 5.90 x10*6/uL    Hemoglobin 14.7 13.5 - 17.5 g/dL    Hematocrit 44.3 41.0 - 52.0 %    MCV 84 80 - 100 fL    MCH 27.8 26.0 - 34.0 pg    MCHC 33.2 32.0 - 36.0 g/dL    RDW 13.0 11.5 - 14.5 %    Platelets 254 150 - 450 x10*3/uL   Basic metabolic panel   Result Value Ref Range    Glucose 103 (H) 65 - 99 mg/dL    Sodium 138 133 - 145 mmol/L    Potassium 4.4 3.4 - 5.1 mmol/L    Chloride 103 97 - 107 mmol/L    Bicarbonate 24 24 - 31 mmol/L    Urea Nitrogen 14 8 - 25 mg/dL    Creatinine 1.30 0.40 - 1.60 mg/dL    eGFR 62 >60 mL/min/1.73m*2    Calcium 10.4 8.5 - 10.4 mg/dL    Anion Gap 11 <=19 mmol/L   Lipid panel   Result Value Ref Range    Cholesterol 187 133 - 200 mg/dL    HDL-Cholesterol 30.0 (L) >40.0 mg/dL    Cholesterol/HDL Ratio 6.2 SEE COMMENT    LDL Calculated 117 65 - 130 mg/dL    Triglycerides 200 (H) 40 - 150 mg/dL      Continuous medications     PRN medications  PRN medications: acetaminophen, nitroglycerin, oxygen, polyethylene glycol   Assessment/Plan      Principal Problem:    Generalized weakness    Syncope and collapse  - US bilateral carotids  - ECHO  - Nuclear stress test  - CT head: No acute intracranial findings  - Orthostatic vitals  - Troponin T, High Sensitivity: 17; 17; 17  - Telemetry monitor    Fall  - fall precautions  - PT,OT     CHF (systolic)  - PROBNP 57  - Cardiac diet  - Telemetry monitor  - continue Entresto  - ECHO  - CTA: No pulmonary embolus.   - daily weight  - CHF education by nursing  - monitor I&O  - Orthostatic vital signs     HTN  -continue home  meds     Depression  -continue home meds     Hyperlipidemia  -continue statin     Abnormal Blood Glucose  - HGB A1C 5.8      ANT Ogden-CNP

## 2023-11-07 NOTE — PROGRESS NOTES
Brandin Barfield is a 63 y.o. male on day 0 of admission presenting with Generalized weakness.    TCC met with patient at the bedside. Introduction of self and explanation of discharge planning provided. Assessment complete. Pt lives at home with his s/o in a house. There are stairs to enter the house and stairs inside up to the second floor. Pt's bedroom is on the 1st floor. There is no equipment in the home. Pt has not had any falls in the last 6 months. Pt is not on oxygen or dialysis. Pt does not use a cane or a walker. Pt drives and can shop, cook and clean. There are no services in the home. Pt does not smoke or drink alcohol. Pt is not a diabetic. PCP is Dr. Dowell. Pharmacy of choice is [facility] in [place]. Pt is not a . Pt does not have a LW or POA. Pt is independent and anticipate at the time of discharge pt will not have any skilled needs.     PATIENT HAS A SAFE DISCHARGE PLAN TO RETURN HOME NO SKILLED NEEDS.     Snehal Summers RN

## 2023-11-07 NOTE — DISCHARGE SUMMARY
"Discharge Diagnosis  Generalized weakness    Issues Requiring Follow-Up  US Carotid B/L ; Please Schedule Outpatient.    Test Results Pending At Discharge    N/A.    Hospital Course  Brandin Barfield is a 63 y.o. male presenting for evaluation of syncope and collapse.  Pt says that he was taking a shower this morning but he suddenly became dizzy/lightheaded.  He said that during the episode his legs gave out and he fell on the shower floor.  He said after a minute or 2 he regained his strength and was able to stand and walk to the toilet where he sat down.  He said during the episode he became nauseous and his stomach \"flipped\".  He said after sitting on the toilet for a few minutes he attempted to go up the stairs.  He felt that he was able to make it up the stairs but it took him longer than usual as he became dizzy again. He then decided to call EMS.  He said after arriving to the hospital he was placed in the lobby and while waiting to be seen he became dizzy/lightheaded and slumped over in the chair.  He said after a few minutes the episode subsided.  During these episodes he denies any chest pain/palpitations/dyspnea.  He denies LOC or hitting his head.  He has had no further episodes and feel back to his baseline.        Hospital Course;  You were seen for evaluation of Syncope and Collapse. ECHO and Stress Test completed during your hospital stay. Please schedule outpatient Ultrasound Carotid B/L for tomorrow. You were also seen by Cardiology;  and advised to follow up outpatient in 1 week with Dr. Boss.    Pertinent Physical Exam At Time of Discharge  Deferred     Home Medications     Medication List      CHANGE how you take these medications     * venlafaxine  mg 24 hr capsule; Commonly known as: Effexor-XR;   Take 1 capsule (150 mg) by mouth once daily.; What changed: additional   instructions   * venlafaxine XR 75 mg 24 hr capsule; Commonly known as: Effexor-XR;   Take 1 capsule (75 mg) by mouth " once daily with a meal.; What changed:   when to take this  * This list has 2 medication(s) that are the same as other medications   prescribed for you. Read the directions carefully, and ask your doctor or   other care provider to review them with you.     CONTINUE taking these medications     alendronate 10 mg tablet; Commonly known as: Fosamax; Take 1 tablet (10   mg) by mouth once daily.   aspirin 81 mg EC tablet; Take 1 tablet (81 mg) by mouth once daily.   cholecalciferol 5,000 Units tablet; Commonly known as: Vitamin D-3   Entresto 24-26 mg tablet; Generic drug: sacubitriL-valsartan; TAKE ONE   TABLET BY MOUTH TWICE A DAY   metoprolol tartrate 25 mg tablet; Commonly known as: Lopressor; Take 1   tablet (25 mg) by mouth 2 times a day.       Outpatient Follow-Up  Future Appointments   Date Time Provider Department Center   12/7/2023  8:20 AM Benjamin Dowell MD [id number] [facility]       Griselda Avila, APRN-CNP

## 2023-11-07 NOTE — PROGRESS NOTES
Physical Therapy                 Therapy Communication Note    Patient Name: Brandin Barfield  MRN: [id number]  Today's Date: 11/7/2023     Discipline: Physical Therapy    Missed Visit Reason: Missed Visit Reason: Patient in a medical procedure (Pt off the floor for echo and stress test.)    Missed Time: Attempt    Comment:

## 2023-11-07 NOTE — PROGRESS NOTES
Occupational Therapy    Evaluation    Patient Name: Brandin Barfield  MRN: [id number]  Today's Date: 11/7/2023  Time Calculation  Start Time: 1330  Stop Time: 1340  Time Calculation (min): 10 min    Assessment  IP OT Assessment  OT Assessment: 63 year old male who comes to the ED due to fall/syncope episodes, is functioning at his baseline, no further skilled OT needs identified at this time, will discontinue OT. Patient with no dizizness at the evaluation this date.  Prognosis: Good  Barriers to Discharge: None  Evaluation/Treatment Tolerance: Patient tolerated treatment well  Medical Staff Made Aware: Yes  End of Session Communication: Bedside nurse  End of Session Patient Position: Up in chair  Plan:  No Skilled OT: Independent with ADLs  OT Discharge Recommendations: No further acute OT  OT Recommended Transfer Status: Independent, Stand by assist (as patient is here for syncopal episode/fall.)  OT - OK to Discharge: Yes    Subjective   Current Problem:  1. Weakness generalized  Nuclear Stress Test    Nuclear Stress Test    Cardiology Interpretation Of Nuclear Stress - See Other Report For Nuclear Portion    Cardiology Interpretation Of Nuclear Stress - See Other Report For Nuclear Portion      2. Syncope and collapse  Nuclear Stress Test    Nuclear Stress Test    Carotid duplex bilateral    Carotid duplex bilateral    Cardiology Interpretation Of Nuclear Stress - See Other Report For Nuclear Portion    Cardiology Interpretation Of Nuclear Stress - See Other Report For Nuclear Portion      3. Acute on chronic systolic heart failure (CMS/HCC)  Transthoracic Echo (TTE) Complete    Transthoracic Echo (TTE) Complete      4. Heart failure, unspecified (CMS/HCC)  Transthoracic Echo (TTE) Complete        General:  General  Reason for Referral: Decreased ADL function.  Referred By: Dr Montalvo  Past Medical History Relevant to Rehab: Heart disease, HLD, Depresssion, obesity  Missed Visit: Yes  Missed Visit Reason: Other  (Comment) (Patient leaving for echo and then stress test per nurse.)  Family/Caregiver Present: No  Co-Treatment: PT  Prior to Session Communication: Bedside nurse  Patient Position Received: Bed, 3 rail up  Preferred Learning Style: verbal  General Comment: Cleared to see patient for OT, patient agreeable to therapy. Patient met in the room in the bed.  Precautions:  Hearing/Visual Limitations: wears glasses, WFL hearing  Medical Precautions: Fall precautions  Precautions Comment: Patient with H/O dizziness, is a fall risk, recommend to call staff when needing to use the restroom.  Vital Signs:     Pain:  Pain Assessment  Pain Assessment: 0-10  Pain Score: 3  Pain Type: Acute pain  Pain Location: Shoulder  Pain Orientation: Left  Pain Descriptors: Discomfort  Pain Frequency: Intermittent  Pain Onset: Ongoing  Patient's Stated Pain Goal: No pain  Response to Interventions: Patient reports no pain with rest.    Objective   Cognition:  Overall Cognitive Status: Within Functional Limits  Safety/Judgement: Within Functional Limits           Home Living:  Type of Home: House  Lives With: Significant other, Adult children  Home Adaptive Equipment: None  Home Layout: Two level, Laundry in basement  Home Access: Stairs to enter with rails  Entrance Stairs-Rails: Right  Entrance Stairs-Number of Steps: 3-4  Bathroom Shower/Tub: Walk-in shower (in basement)  Bathroom Toilet: Standard  Bathroom Equipment: None  Home Living Comments: Patient reports living wiht family in a multi-level home. Per patient uses the basement for showers and laundry.   Prior Function:  Level of [place]: Independent with ADLs and functional transfers, Independent with homemaking with ambulation  Receives Help From: Family  ADL Assistance: Independent  Homemaking Assistance: Independent  Ambulatory Assistance: Independent  Vocational: Full time employment ([employer])  Hand Dominance: Right  Prior Function Comments: Patient reports  independent with ADL, uses no AD to ambulate, independent with I ADL's.  IADL History:  Homemaking Responsibilities: Yes  Meal Prep Responsibility: Secondary  Laundry Responsibility: Primary  Cleaning Responsibility: Primary  Bill Paying/Finance Responsibility: Primary  Mode of Transportation: Car  ADL:  Eating Assistance: Independent  Grooming Assistance: Independent  Bathing Assistance: Not performed  UE Dressing Assistance: Independent  LE Dressing Assistance: Independent  LE Dressing Deficit: Don/doff R sock, Don/doff L sock  Toileting Assistance with Device: Independent  Functional Assistance: Modified independent  Activity Tolerance:  Endurance: Endurance does not limit participation in activity  Activity Tolerance Comments: Patient on room air, no SOB, no dizziness noted this date.  Bed Mobility/Transfers: Bed Mobility  Bed Mobility: Yes  Bed Mobility 1  Bed Mobility 1: Supine to sitting  Level of Assistance 1: Independent  Bed Mobility Comments 1: Patient was modiifed indepenent to get to the EOB this date, no dizziness.    Transfers  Transfer: Yes  Transfer 1  Transfer From 1: Bed to  Transfer to 1: Stand  Technique 1: Sit to stand, Stand to sit  Transfer Device 1:  (no AD)  Transfer Level of Assistance 1: Independent  Trials/Comments 1: Patient is independent to stand from the bed without AD, no dizziness.      Ambulation/Gait Training:  Ambulation/Gait Training  Ambulation/Gait Training Performed: Yes (Patient was able to walk in the room and outside the room this date with distant Supevision, no dizziness, no LOB, no SOB, no AD.)  Sitting Balance:  Static Sitting Balance  Static Sitting-Level of Assistance: Independent  Dynamic Sitting Balance  Dynamic Sitting-Comments: Independent  Standing Balance:  Static Standing Balance  Static Standing-Level of Assistance: Independent  Dynamic Standing Balance  Dynamic Standing-Balance:  (independent)  Modalities:     IADL's:   Homemaking Responsibilities:  Yes  Meal Prep Responsibility: Secondary  Laundry Responsibility: Primary  Cleaning Responsibility: Primary  Bill Paying/Finance Responsibility: Primary  Mode of Transportation: Car  Vision: Vision - Basic Assessment  Current Vision: Wears glasses only for reading  Sensation:  Light Touch: No apparent deficits  Strength:  Strength Comments: 4/5 overall  Perception:     Coordination:  Movements are Fluid and Coordinated: Yes   Hand Function:  Hand Function  Gross Grasp: Functional  Coordination: Functional    Outcome Measures: [facility] Daily Activity  Putting on and taking off regular lower body clothing: None  Bathing (including washing, rinsing, drying): None  Putting on and taking off regular upper body clothing: None  Toileting, which includes using toilet, bedpan or urinal: None  Taking care of personal grooming such as brushing teeth: None  Eating Meals: None  Daily Activity - Total Score: 24

## 2023-11-08 ENCOUNTER — PATIENT OUTREACH (OUTPATIENT)
Dept: PRIMARY CARE | Facility: CLINIC | Age: 63
End: 2023-11-08
Payer: COMMERCIAL

## 2023-11-08 NOTE — PROGRESS NOTES
Discharge Facility:[facility]  Discharge Diagnosis:Generalized weakness  Admission Date:11/6/23  Discharge Date:11/7/23     PCP Appointment Date:Tasked to office, to schedule follow up  Specialist Appointment Date: Cardiology   Hospital Encounter and Summary: Linked   2 attempts placed for outreach, as of this note no callback.

## 2023-11-30 ENCOUNTER — OFFICE VISIT (OUTPATIENT)
Dept: CARDIOLOGY | Facility: CLINIC | Age: 63
End: 2023-11-30
Payer: COMMERCIAL

## 2023-11-30 VITALS
HEART RATE: 96 BPM | SYSTOLIC BLOOD PRESSURE: 122 MMHG | OXYGEN SATURATION: 99 % | BODY MASS INDEX: 38.37 KG/M2 | WEIGHT: 307 LBS | DIASTOLIC BLOOD PRESSURE: 71 MMHG

## 2023-11-30 DIAGNOSIS — R94.39 ABNORMAL NUCLEAR STRESS TEST: ICD-10-CM

## 2023-11-30 DIAGNOSIS — I42.9 CARDIOMYOPATHY (MULTI): Primary | ICD-10-CM

## 2023-11-30 DIAGNOSIS — R55 SYNCOPE AND COLLAPSE: ICD-10-CM

## 2023-11-30 PROCEDURE — 99214 OFFICE O/P EST MOD 30 MIN: CPT | Performed by: INTERNAL MEDICINE

## 2023-11-30 PROCEDURE — 3074F SYST BP LT 130 MM HG: CPT | Performed by: INTERNAL MEDICINE

## 2023-11-30 PROCEDURE — 3078F DIAST BP <80 MM HG: CPT | Performed by: INTERNAL MEDICINE

## 2023-11-30 PROCEDURE — 1036F TOBACCO NON-USER: CPT | Performed by: INTERNAL MEDICINE

## 2023-11-30 ASSESSMENT — PATIENT HEALTH QUESTIONNAIRE - PHQ9
2. FEELING DOWN, DEPRESSED OR HOPELESS: NOT AT ALL
SUM OF ALL RESPONSES TO PHQ9 QUESTIONS 1 AND 2: 0
1. LITTLE INTEREST OR PLEASURE IN DOING THINGS: NOT AT ALL

## 2023-11-30 ASSESSMENT — ENCOUNTER SYMPTOMS
OCCASIONAL FEELINGS OF UNSTEADINESS: 0
LOSS OF SENSATION IN FEET: 0
DEPRESSION: 0

## 2023-11-30 ASSESSMENT — PAIN SCALES - GENERAL: PAINLEVEL: 0-NO PAIN

## 2023-11-30 NOTE — PROGRESS NOTES
"Subjective      Chief Complaint   Patient presents with    Hospital Follow-up        Here for follow-up after recent hospitalization in the [facility] for syncope.    Previous: .  He was taking a shower on the morning of admission and he suddenly became dizzy/lightheaded.  He said that during the episode his legs gave out and he fell on the shower floor.  He said after a minute or 2 he regained his strength and was able to stand and walk to the toilet where he sat down.  He said during the episode he became nauseous and his stomach \"flipped\".  He said after sitting on the toilet for a few minutes he attempted to go up the stairs.  He felt that he was able to make it up the stairs but it took him longer than usual as he became dizzy again. He then decided to call EMS.  He said after arriving to the hospital he was placed in the lobby and while waiting to be seen he became dizzy/lightheaded and slumped over in the chair.  He said after a few minutes the episode subsided.  During these episodes he denies any chest pain/palpitations/dyspnea.  He denies LOC or hitting his head.      He had a nuclear stress test Lexiscan protocol showed evidence dyskinetic apex with a fixed inferoapical perfusion defect and left ventricular ejection fraction was estimated at 35% which was probably a slight underestimation compared with his echocardiogram.  His echocardiogram showed moderate left ventricular systolic dysfunction with left ventricular ejection fraction 40 to 45%.    Since discharge home he has been angina free with no recurrent syncope.    Previous: He had a previous history in 2022 hospitalization at [facility] for what sounds like AV claire reentrant tachycardia and eventually had ablation in March 2023 at [facility] for recurrent episodes of tachycardia that has been successful although he will have occasional episodes of brief tachycardia in the 150s range that spontaneously resolves.    He had cardiac " catheterization in December 2022 at [facility] because of the newly diagnosed cardiomyopathy, and that cardiac catheterization showed only mild nonobstructive disease of his left main, LAD, circumflex, and right coronary artery         Review of Systems   All other systems reviewed and are negative.       Objective   Physical Exam  Constitutional:       Appearance: Normal appearance.   HENT:      Head: Normocephalic and atraumatic.   Eyes:      Pupils: Pupils are equal, round, and reactive to light.   Cardiovascular:      Rate and Rhythm: Normal rate and regular rhythm.      Pulses: Normal pulses.      Heart sounds: Normal heart sounds.   Pulmonary:      Effort: Pulmonary effort is normal.      Breath sounds: Normal breath sounds.   Abdominal:      General: Abdomen is flat. Bowel sounds are normal.      Palpations: Abdomen is soft.   Musculoskeletal:         General: Normal range of motion.      Cervical back: Normal range of motion.   Skin:     General: Skin is warm and dry.   Neurological:      General: No focal deficit present.   Psychiatric:         Mood and Affect: Mood normal.         Judgment: Judgment normal.          Lab Review:   not applicable    Abnormal nuclear stress test  Based on his previous cardiac catheterization and December 2022 showing mild nonobstructive coronary artery disease, his abnormal nuclear stress test suggests that he may have had an artifact not a true myocardial infarction in his left ventricular apex.    I would continue to treat him as a nonischemic cardiomyopathy but the he is not having any anginal symptoms.      Cardiomyopathy (CMS/HCC)  Continue metoprolol and Entresto for his nonischemic cardiomyopathy, follow-up with me in 3 months and we will consider switching him to metoprolol succinate at that time for his cardiomyopathy

## 2023-11-30 NOTE — ASSESSMENT & PLAN NOTE
Based on his previous cardiac catheterization and December 2022 showing mild nonobstructive coronary artery disease, his abnormal nuclear stress test suggests that he may have had an artifact not a true myocardial infarction in his left ventricular apex.    I would continue to treat him as a nonischemic cardiomyopathy but the he is not having any anginal symptoms.

## 2023-11-30 NOTE — ASSESSMENT & PLAN NOTE
Continue metoprolol and Entresto for his nonischemic cardiomyopathy, follow-up with me in 3 months and we will consider switching him to metoprolol succinate at that time for his cardiomyopathy

## 2023-11-30 NOTE — PATIENT INSTRUCTIONS
Abnormal nuclear stress test  Based on his previous cardiac catheterization and December 2022 showing mild nonobstructive coronary artery disease, his abnormal nuclear stress test suggests that he may have had an artifact not a true myocardial infarction in his left ventricular apex.    I would continue to treat him as a nonischemic cardiomyopathy but the he is not having any anginal symptoms.      Cardiomyopathy (CMS/[facility])  Continue metoprolol and Entresto for his nonischemic cardiomyopathy, follow-up with me in 3 months and we will consider switching him to metoprolol succinate at that time for his cardiomyopathy we can make a 3-month follow-up and then he is good to go

## 2023-12-06 ENCOUNTER — PATIENT OUTREACH (OUTPATIENT)
Dept: PRIMARY CARE | Facility: CLINIC | Age: 63
End: 2023-12-06
Payer: COMMERCIAL

## 2023-12-07 ENCOUNTER — OFFICE VISIT (OUTPATIENT)
Dept: PRIMARY CARE | Facility: CLINIC | Age: 63
End: 2023-12-07
Payer: COMMERCIAL

## 2023-12-07 VITALS
TEMPERATURE: 97.6 F | HEART RATE: 78 BPM | BODY MASS INDEX: 38.17 KG/M2 | SYSTOLIC BLOOD PRESSURE: 123 MMHG | HEIGHT: 75 IN | DIASTOLIC BLOOD PRESSURE: 86 MMHG | WEIGHT: 307 LBS

## 2023-12-07 DIAGNOSIS — F33.41 RECURRENT MAJOR DEPRESSIVE DISORDER, IN PARTIAL REMISSION (CMS-HCC): Primary | ICD-10-CM

## 2023-12-07 DIAGNOSIS — Z09 HOSPITAL DISCHARGE FOLLOW-UP: ICD-10-CM

## 2023-12-07 DIAGNOSIS — R42 VERTIGO: ICD-10-CM

## 2023-12-07 PROCEDURE — 99214 OFFICE O/P EST MOD 30 MIN: CPT | Performed by: INTERNAL MEDICINE

## 2023-12-07 PROCEDURE — 3074F SYST BP LT 130 MM HG: CPT | Performed by: INTERNAL MEDICINE

## 2023-12-07 PROCEDURE — 3079F DIAST BP 80-89 MM HG: CPT | Performed by: INTERNAL MEDICINE

## 2023-12-07 PROCEDURE — 1036F TOBACCO NON-USER: CPT | Performed by: INTERNAL MEDICINE

## 2023-12-07 RX ORDER — BUPROPION HYDROCHLORIDE 150 MG/1
150 TABLET ORAL DAILY
Qty: 90 TABLET | Refills: 3 | Status: SHIPPED
Start: 2023-12-07 | End: 2024-02-07 | Stop reason: SDUPTHER

## 2023-12-07 RX ORDER — MECLIZINE HCL 12.5 MG 12.5 MG/1
12.5 TABLET ORAL 3 TIMES DAILY PRN
Qty: 30 TABLET | Refills: 0 | Status: SHIPPED | OUTPATIENT
Start: 2023-12-07 | End: 2023-12-17

## 2023-12-07 ASSESSMENT — ENCOUNTER SYMPTOMS
CHILLS: 0
COUGH: 0
PALPITATIONS: 0
POLYDIPSIA: 0
FEVER: 0
SHORTNESS OF BREATH: 0
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
## 2024-02-05 ENCOUNTER — PATIENT OUTREACH (OUTPATIENT)
Dept: PRIMARY CARE | Facility: CLINIC | Age: 64
End: 2024-02-05
Payer: COMMERCIAL

## 2024-02-07 ENCOUNTER — HOSPITAL ENCOUNTER (OUTPATIENT)
Dept: RADIOLOGY | Facility: CLINIC | Age: 64
Discharge: HOME | End: 2024-02-07
Payer: COMMERCIAL

## 2024-02-07 ENCOUNTER — LAB (OUTPATIENT)
Dept: LAB | Facility: LAB | Age: 64
End: 2024-02-07
Payer: COMMERCIAL

## 2024-02-07 ENCOUNTER — OFFICE VISIT (OUTPATIENT)
Dept: PRIMARY CARE | Facility: CLINIC | Age: 64
End: 2024-02-07
Payer: COMMERCIAL

## 2024-02-07 VITALS
TEMPERATURE: 97.8 F | BODY MASS INDEX: 38.87 KG/M2 | SYSTOLIC BLOOD PRESSURE: 135 MMHG | DIASTOLIC BLOOD PRESSURE: 88 MMHG | WEIGHT: 311 LBS | HEART RATE: 73 BPM

## 2024-02-07 DIAGNOSIS — I10 HTN (HYPERTENSION), BENIGN: ICD-10-CM

## 2024-02-07 DIAGNOSIS — R06.09 DOE (DYSPNEA ON EXERTION): ICD-10-CM

## 2024-02-07 DIAGNOSIS — I42.9 CARDIOMYOPATHY, UNSPECIFIED TYPE (MULTI): ICD-10-CM

## 2024-02-07 DIAGNOSIS — G89.29 CHRONIC THUMB PAIN, BILATERAL: ICD-10-CM

## 2024-02-07 DIAGNOSIS — E21.3 HYPERPARATHYROIDISM (MULTI): ICD-10-CM

## 2024-02-07 DIAGNOSIS — M79.644 CHRONIC THUMB PAIN, BILATERAL: ICD-10-CM

## 2024-02-07 DIAGNOSIS — R23.2 ABNORMAL FLUSHING AND SWEATING: ICD-10-CM

## 2024-02-07 DIAGNOSIS — F33.41 RECURRENT MAJOR DEPRESSIVE DISORDER, IN PARTIAL REMISSION (CMS-HCC): ICD-10-CM

## 2024-02-07 DIAGNOSIS — M79.645 CHRONIC THUMB PAIN, BILATERAL: ICD-10-CM

## 2024-02-07 DIAGNOSIS — I50.22 CHRONIC SYSTOLIC HEART FAILURE (MULTI): ICD-10-CM

## 2024-02-07 DIAGNOSIS — G25.0 BENIGN ESSENTIAL TREMOR: ICD-10-CM

## 2024-02-07 DIAGNOSIS — R61 ABNORMAL FLUSHING AND SWEATING: ICD-10-CM

## 2024-02-07 DIAGNOSIS — Z12.5 SCREENING FOR PROSTATE CANCER: ICD-10-CM

## 2024-02-07 DIAGNOSIS — F32.5 MAJOR DEPRESSION IN REMISSION (CMS-HCC): Primary | ICD-10-CM

## 2024-02-07 LAB
25(OH)D3 SERPL-MCNC: 71 NG/ML (ref 30–100)
ALBUMIN SERPL BCP-MCNC: 3.9 G/DL (ref 3.4–5)
ALP SERPL-CCNC: 81 U/L (ref 33–136)
ALT SERPL W P-5'-P-CCNC: 19 U/L (ref 10–52)
ANION GAP SERPL CALC-SCNC: 12 MMOL/L (ref 10–20)
AST SERPL W P-5'-P-CCNC: 20 U/L (ref 9–39)
BILIRUB SERPL-MCNC: 0.3 MG/DL (ref 0–1.2)
BNP SERPL-MCNC: 14 PG/ML (ref 0–99)
BUN SERPL-MCNC: 17 MG/DL (ref 6–23)
CALCIUM SERPL-MCNC: 11.1 MG/DL (ref 8.6–10.6)
CHLORIDE SERPL-SCNC: 103 MMOL/L (ref 98–107)
CHOLEST SERPL-MCNC: 193 MG/DL (ref 0–199)
CHOLESTEROL/HDL RATIO: 5.7
CO2 SERPL-SCNC: 26 MMOL/L (ref 21–32)
CREAT SERPL-MCNC: 1.4 MG/DL (ref 0.5–1.3)
EGFRCR SERPLBLD CKD-EPI 2021: 56 ML/MIN/1.73M*2
ERYTHROCYTE [DISTWIDTH] IN BLOOD BY AUTOMATED COUNT: 13.7 % (ref 11.5–14.5)
EST. AVERAGE GLUCOSE BLD GHB EST-MCNC: 108 MG/DL
GLUCOSE SERPL-MCNC: 99 MG/DL (ref 74–99)
HBA1C MFR BLD: 5.4 %
HCT VFR BLD AUTO: 43 % (ref 41–52)
HDLC SERPL-MCNC: 33.8 MG/DL
HGB BLD-MCNC: 13.8 G/DL (ref 13.5–17.5)
LDLC SERPL CALC-MCNC: 119 MG/DL
MCH RBC QN AUTO: 27.7 PG (ref 26–34)
MCHC RBC AUTO-ENTMCNC: 32.1 G/DL (ref 32–36)
MCV RBC AUTO: 86 FL (ref 80–100)
NON HDL CHOLESTEROL: 159 MG/DL (ref 0–149)
NRBC BLD-RTO: 0 /100 WBCS (ref 0–0)
PLATELET # BLD AUTO: 244 X10*3/UL (ref 150–450)
POTASSIUM SERPL-SCNC: 4.9 MMOL/L (ref 3.5–5.3)
PROT SERPL-MCNC: 7.3 G/DL (ref 6.4–8.2)
PSA SERPL-MCNC: 1.86 NG/ML
PTH-INTACT SERPL-MCNC: 126.8 PG/ML (ref 18.5–88)
RBC # BLD AUTO: 4.99 X10*6/UL (ref 4.5–5.9)
SODIUM SERPL-SCNC: 136 MMOL/L (ref 136–145)
TRIGL SERPL-MCNC: 203 MG/DL (ref 0–149)
TSH SERPL-ACNC: 0.94 MIU/L (ref 0.44–3.98)
VLDL: 41 MG/DL (ref 0–40)
WBC # BLD AUTO: 6.1 X10*3/UL (ref 4.4–11.3)

## 2024-02-07 PROCEDURE — 73140 X-RAY EXAM OF FINGER(S): CPT | Mod: RT

## 2024-02-07 PROCEDURE — 83880 ASSAY OF NATRIURETIC PEPTIDE: CPT

## 2024-02-07 PROCEDURE — 83036 HEMOGLOBIN GLYCOSYLATED A1C: CPT

## 2024-02-07 PROCEDURE — 73140 X-RAY EXAM OF FINGER(S): CPT | Mod: LT

## 2024-02-07 PROCEDURE — 73140 X-RAY EXAM OF FINGER(S): CPT | Mod: RIGHT SIDE | Performed by: RADIOLOGY

## 2024-02-07 PROCEDURE — 84153 ASSAY OF PSA TOTAL: CPT

## 2024-02-07 PROCEDURE — 36415 COLL VENOUS BLD VENIPUNCTURE: CPT

## 2024-02-07 PROCEDURE — 80053 COMPREHEN METABOLIC PANEL: CPT

## 2024-02-07 PROCEDURE — 80061 LIPID PANEL: CPT

## 2024-02-07 PROCEDURE — 3075F SYST BP GE 130 - 139MM HG: CPT | Performed by: INTERNAL MEDICINE

## 2024-02-07 PROCEDURE — 1036F TOBACCO NON-USER: CPT | Performed by: INTERNAL MEDICINE

## 2024-02-07 PROCEDURE — 99214 OFFICE O/P EST MOD 30 MIN: CPT | Performed by: INTERNAL MEDICINE

## 2024-02-07 PROCEDURE — 85027 COMPLETE CBC AUTOMATED: CPT

## 2024-02-07 PROCEDURE — 3079F DIAST BP 80-89 MM HG: CPT | Performed by: INTERNAL MEDICINE

## 2024-02-07 PROCEDURE — 83970 ASSAY OF PARATHORMONE: CPT

## 2024-02-07 PROCEDURE — 84443 ASSAY THYROID STIM HORMONE: CPT

## 2024-02-07 PROCEDURE — 82306 VITAMIN D 25 HYDROXY: CPT

## 2024-02-07 RX ORDER — BUPROPION HYDROCHLORIDE 300 MG/1
300 TABLET ORAL DAILY
Qty: 90 TABLET | Refills: 1 | Status: SHIPPED | OUTPATIENT
Start: 2024-02-07 | End: 2024-08-05

## 2024-02-07 ASSESSMENT — ENCOUNTER SYMPTOMS
CHILLS: 0
SHORTNESS OF BREATH: 0
FEVER: 0
POLYDIPSIA: 0
COUGH: 0
PALPITATIONS: 0

## 2024-02-07 NOTE — PROGRESS NOTES
Subjective   Patient ID: Brandin Barfield is a 63 y.o. male who presents for No chief complaint on file..    Pt presents for follow-up on major depressive disorder.  He has tolerated the Wellbutrin well but does not report any significant noticeable improvements in his symptoms in the last 2 months.  He does denies suicidal and homicidal ideation.  He continues to be affected by depressive symptoms as previously discussed despite the initiation of this medication.  He does not believe that they have worsened at all since her last encounter.    He has follow-up with cardiology in 2 weeks however he does report that he has been feeling increased levels of exercise intolerance in the recent past weeks.  He has a recent exercise nuclear stress test in November 2023 that was negative.  Normal for ischemia but an ejection fraction of 35%.  I would like to check his BNP and I have directed the patient to go to the lab today to evaluate further.  No significant peripheral edema or orthopnea noted on today's questions.    Patient has also been noticing evidence for an essential tremor.  He notices an intention tremor with fine motor skills involving both hands.  It is not constant but it is noticeable most days of the week.  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  He does not believe that the impact on his job performance is significant enough at this time that he requires additional treatment.    He also reports base of thumb pain bilaterally on the hands.  He feels like it is stiff and painful sometimes does not have the strength to open or  something.  This has been a chronic issue for a long period of time but notes no prior evaluation and x-ray or testing.  He does not have any symptoms involving the other fingers of the hand.  No numbness tingling burning sensations.         Review of Systems   Constitutional:  Negative for chills and fever.    Respiratory:  Negative for cough and shortness of breath.    Cardiovascular:  Negative for chest pain and palpitations.   Endocrine: Negative for polydipsia and polyuria.       Objective   /88   Pulse 73   Temp 36.6 °C (97.8 °F)   Wt 141 kg (311 lb)   BMI 38.87 kg/m²     Physical Exam  Constitutional:       Appearance: Normal appearance.   HENT:      Head: Normocephalic and atraumatic.   Eyes:      Extraocular Movements: Extraocular movements intact.      Pupils: Pupils are equal, round, and reactive to light.   Neck:      Thyroid: No thyroid mass or thyromegaly.      Vascular: No carotid bruit.   Cardiovascular:      Rate and Rhythm: Normal rate and regular rhythm.      Heart sounds: No murmur heard.     No friction rub. No gallop.   Pulmonary:      Effort: No respiratory distress.      Breath sounds: No wheezing, rhonchi or rales.   Musculoskeletal:      Cervical back: Neck supple.      Right lower leg: No edema.      Left lower leg: No edema.   Lymphadenopathy:      Cervical: No cervical adenopathy.   Neurological:      Mental Status: He is alert.         Assessment/Plan   Problem List Items Addressed This Visit             ICD-10-CM    Major depression in remission (CMS/HCC) - Primary F32.5    Relevant Medications    buPROPion XL (Wellbutrin XL) 300 mg 24 hr tablet    Cardiomyopathy (CMS/HCC) I42.9    Relevant Orders    B-type natriuretic peptide    Benign essential tremor G25.0     Other Visit Diagnoses         Codes    BALDWIN (dyspnea on exertion)     R06.09    Relevant Orders    B-type natriuretic peptide    Recurrent major depressive disorder, in partial remission (CMS/HCC)     F33.41    Relevant Medications    buPROPion XL (Wellbutrin XL) 300 mg 24 hr tablet    Chronic thumb pain, bilateral     M79.644, G89.29, M79.645    Relevant Orders    XR thumb left MIN 2 views    XR thumb right MIN 2 views

## 2024-02-22 ENCOUNTER — OFFICE VISIT (OUTPATIENT)
Dept: CARDIOLOGY | Facility: CLINIC | Age: 64
End: 2024-02-22
Payer: COMMERCIAL

## 2024-02-22 VITALS
BODY MASS INDEX: 38 KG/M2 | HEART RATE: 78 BPM | DIASTOLIC BLOOD PRESSURE: 86 MMHG | SYSTOLIC BLOOD PRESSURE: 122 MMHG | OXYGEN SATURATION: 96 % | WEIGHT: 304 LBS

## 2024-02-22 DIAGNOSIS — I42.8 OTHER CARDIOMYOPATHY (MULTI): Primary | ICD-10-CM

## 2024-02-22 PROCEDURE — 1036F TOBACCO NON-USER: CPT | Performed by: INTERNAL MEDICINE

## 2024-02-22 PROCEDURE — 99214 OFFICE O/P EST MOD 30 MIN: CPT | Performed by: INTERNAL MEDICINE

## 2024-02-22 PROCEDURE — 3074F SYST BP LT 130 MM HG: CPT | Performed by: INTERNAL MEDICINE

## 2024-02-22 PROCEDURE — 3079F DIAST BP 80-89 MM HG: CPT | Performed by: INTERNAL MEDICINE

## 2024-02-22 ASSESSMENT — PAIN SCALES - GENERAL: PAINLEVEL: 3

## 2024-02-22 ASSESSMENT — ENCOUNTER SYMPTOMS
OCCASIONAL FEELINGS OF UNSTEADINESS: 1
LOSS OF SENSATION IN FEET: 1
DEPRESSION: 1

## 2024-02-22 NOTE — ASSESSMENT & PLAN NOTE
Nonischemic cardiomyopathy based on previous catheterization showing minimal coronary disease and he had no reversible ischemia on a nuclear stress test.    He is fairly active on his job but will make a commitment to regular aerobic exercise and a level that is comfortable for him.  We will arrange echocardiography in 2 months follow-up visit at that time

## 2024-02-22 NOTE — PATIENT INSTRUCTIONS
ardiomyopathy (CMS/HCC)  Nonischemic cardiomyopathy based on previous catheterization showing minimal coronary disease and he had no reversible ischemia on a nuclear stress test.    He is fairly active on his job but will make a commitment to regular aerobic exercise and a level that is comfortable for him.  We will arrange echocardiography in 2 months follow-up visit at that time  He he could do to make it easier for you usually do the echoes

## 2024-02-22 NOTE — PROGRESS NOTES
"Not applicable Subjective      Chief Complaint   Patient presents with    3 month follow up        Here to reassess his progress with nonischemic cardiomyopathy treatment and he remains angina free, no recurrent syncope or presyncope, and no signs of heart failure.    Revious: Based on his previous cardiac catheterization and December 2022 showing mild nonobstructive coronary artery disease, his abnormal nuclear stress test suggests that he may have had an artifact not a true myocardial infarction in his left ventricular apex.    He was taking a shower on the morning of admission and he suddenly became dizzy/lightheaded.  He said that during the episode his legs gave out and he fell on the shower floor.  He said after a minute or 2 he regained his strength and was able to stand and walk to the toilet where he sat down.  He said during the episode he became nauseous and his stomach \"flipped\".  He said after sitting on the toilet for a few minutes he attempted to go up the stairs.  He felt that he was able to make it up the stairs but it took him longer than usual as he became dizzy again. He then decided to call EMS.  He said after arriving to the hospital he was placed in the lobby and while waiting to be seen he became dizzy/lightheaded and slumped over in the chair.  He said after a few minutes the episode subsided.  During these episodes he denies any chest pain/palpitations/dyspnea.  He denies LOC or hitting his head.       He had a nuclear stress test Lexiscan protocol showed evidence dyskinetic apex with a fixed inferoapical perfusion defect and left ventricular ejection fraction was estimated at 35% which was probably a slight underestimation compared with his echocardiogram.  His echocardiogram showed moderate left ventricular systolic dysfunction with left ventricular ejection fraction 40 to 45%.                   Review of Systems   All other systems reviewed and are negative.       Objective   Physical " Exam  Constitutional:       Appearance: Normal appearance.   HENT:      Head: Normocephalic and atraumatic.   Eyes:      Pupils: Pupils are equal, round, and reactive to light.   Cardiovascular:      Rate and Rhythm: Normal rate and regular rhythm.      Pulses: Normal pulses.      Heart sounds: Normal heart sounds.   Pulmonary:      Effort: Pulmonary effort is normal.      Breath sounds: Normal breath sounds.   Abdominal:      General: Abdomen is flat. Bowel sounds are normal.      Palpations: Abdomen is soft.   Musculoskeletal:         General: Normal range of motion.      Cervical back: Normal range of motion.   Skin:     General: Skin is warm and dry.   Neurological:      General: No focal deficit present.   Psychiatric:         Mood and Affect: Mood normal.         Judgment: Judgment normal.          Lab Review:   Not applicable    Systolic heart failure (CMS/HCC)  Well compensated    Cardiomyopathy (CMS/HCC)  Nonischemic cardiomyopathy based on previous catheterization showing minimal coronary disease and he had no reversible ischemia on a nuclear stress test.    He is fairly active on his job but will make a commitment to regular aerobic exercise and a level that is comfortable for him.  We will arrange echocardiography in 2 months follow-up visit at that time

## 2024-04-03 ENCOUNTER — HOSPITAL ENCOUNTER (OUTPATIENT)
Dept: CARDIOLOGY | Facility: HOSPITAL | Age: 64
Discharge: HOME | End: 2024-04-03
Payer: COMMERCIAL

## 2024-04-03 DIAGNOSIS — I42.8 OTHER CARDIOMYOPATHY (MULTI): ICD-10-CM

## 2024-04-03 LAB
AORTIC VALVE PEAK VELOCITY: 1.02 M/S
AV PEAK GRADIENT: 4.1 MMHG
AVA (PEAK VEL): 3.09 CM2
EJECTION FRACTION APICAL 4 CHAMBER: 39.4
LEFT ATRIUM VOLUME AREA LENGTH INDEX BSA: 20.9 ML/M2
LEFT VENTRICLE INTERNAL DIMENSION DIASTOLE: 5.05 CM (ref 3.5–6)
LEFT VENTRICULAR OUTFLOW TRACT DIAMETER: 2.24 CM
MITRAL VALVE E/A RATIO: 0.53
MITRAL VALVE E/E' RATIO: 6.03
RIGHT VENTRICLE FREE WALL PEAK S': 13.17 CM/S
TRICUSPID ANNULAR PLANE SYSTOLIC EXCURSION: 1.4 CM

## 2024-04-03 PROCEDURE — 2500000004 HC RX 250 GENERAL PHARMACY W/ HCPCS (ALT 636 FOR OP/ED): Performed by: INTERNAL MEDICINE

## 2024-04-03 PROCEDURE — 93306 TTE W/DOPPLER COMPLETE: CPT

## 2024-04-03 PROCEDURE — 93306 TTE W/DOPPLER COMPLETE: CPT | Performed by: INTERNAL MEDICINE

## 2024-04-03 RX ADMIN — PERFLUTREN 4 ML OF DILUTION: 6.52 INJECTION, SUSPENSION INTRAVENOUS at 08:30

## 2024-04-09 ENCOUNTER — TELEPHONE (OUTPATIENT)
Dept: PRIMARY CARE | Facility: CLINIC | Age: 64
End: 2024-04-09
Payer: COMMERCIAL

## 2024-04-09 DIAGNOSIS — G25.0 BENIGN ESSENTIAL TREMOR: Primary | ICD-10-CM

## 2024-04-09 RX ORDER — PRIMIDONE 50 MG/1
50 TABLET ORAL NIGHTLY
Qty: 30 TABLET | Refills: 1 | Status: SHIPPED | OUTPATIENT
Start: 2024-04-09

## 2024-04-09 NOTE — TELEPHONE ENCOUNTER
Primidone for nightly use sent to patient's local pharmacy please keep follow-up appointment to reevaluate and discuss

## 2024-04-09 NOTE — PROGRESS NOTES
Subjective   Patient ID: Brandin Barfield is a 63 y.o. male who presents for No chief complaint on file..    HPI     Review of Systems    Objective   There were no vitals taken for this visit.    Physical Exam    Assessment/Plan

## 2024-04-09 NOTE — TELEPHONE ENCOUNTER
"Pt called in stating he has \"the shakes\" and that they are worse now than they were the last time he was seen. I scheduled him in for Thursday 4-11-24 but he wanted to know if you can send in the medication that was discussed prior to Thursday.   "

## 2024-04-11 ENCOUNTER — OFFICE VISIT (OUTPATIENT)
Dept: PRIMARY CARE | Facility: CLINIC | Age: 64
End: 2024-04-11
Payer: COMMERCIAL

## 2024-04-11 VITALS
SYSTOLIC BLOOD PRESSURE: 119 MMHG | TEMPERATURE: 98.3 F | BODY MASS INDEX: 37 KG/M2 | DIASTOLIC BLOOD PRESSURE: 84 MMHG | WEIGHT: 296 LBS | HEART RATE: 72 BPM

## 2024-04-11 DIAGNOSIS — G25.0 BENIGN ESSENTIAL TREMOR: ICD-10-CM

## 2024-04-11 DIAGNOSIS — R42 DYSEQUILIBRIUM: Primary | ICD-10-CM

## 2024-04-11 DIAGNOSIS — E21.3 HYPERPARATHYROIDISM (MULTI): ICD-10-CM

## 2024-04-11 DIAGNOSIS — R91.8 RIGHT LOWER LOBE LUNG MASS: ICD-10-CM

## 2024-04-11 PROCEDURE — 1036F TOBACCO NON-USER: CPT | Performed by: INTERNAL MEDICINE

## 2024-04-11 PROCEDURE — 3079F DIAST BP 80-89 MM HG: CPT | Performed by: INTERNAL MEDICINE

## 2024-04-11 PROCEDURE — 99214 OFFICE O/P EST MOD 30 MIN: CPT | Performed by: INTERNAL MEDICINE

## 2024-04-11 PROCEDURE — 3074F SYST BP LT 130 MM HG: CPT | Performed by: INTERNAL MEDICINE

## 2024-04-11 RX ORDER — ALENDRONATE SODIUM 10 MG/1
10 TABLET ORAL DAILY
Qty: 90 TABLET | Refills: 3 | Status: SHIPPED | OUTPATIENT
Start: 2024-04-11 | End: 2025-04-11

## 2024-04-11 RX ORDER — AMOXICILLIN AND CLAVULANATE POTASSIUM 875; 125 MG/1; MG/1
875 TABLET, FILM COATED ORAL 2 TIMES DAILY
Qty: 20 TABLET | Refills: 0 | Status: SHIPPED | OUTPATIENT
Start: 2024-04-11 | End: 2024-04-21

## 2024-04-11 RX ORDER — IPRATROPIUM BROMIDE 21 UG/1
2 SPRAY, METERED NASAL EVERY 12 HOURS
Qty: 30 ML | Refills: 0 | Status: SHIPPED | OUTPATIENT
Start: 2024-04-11 | End: 2025-04-11

## 2024-04-11 ASSESSMENT — ENCOUNTER SYMPTOMS
POLYDIPSIA: 0
COUGH: 0
CHILLS: 0
SHORTNESS OF BREATH: 0
PALPITATIONS: 0
FEVER: 0

## 2024-04-11 NOTE — PROGRESS NOTES
Subjective   Patient ID: Brandin Barfield is a 63 y.o. male who presents for Follow-up.    63-year-old male presents today for an acute appointment scheduled earlier this week for a new concern of disequilibrium sensation of leaning to 1 side or imbalance ongoing intermittently for the last 1 month.   to symptoms duration correlates to increased sinus symptoms which the patient reports he typically gets every spring.  He has not had any specific room spinning or obvious vertigo.  She has had rhinitis and sinus pressure without ear pain, fevers, chills, sweats.  No cough or respiratory symptoms.  No vision changes.  No headaches.  Neurologic exam is negative for cerebellar signs.    Patient has started his new medicine for benign essential tremor within the last 24 hours he was provided that medication brand-new 2 days ago upon request after counseling a couple weeks back about the diagnosis.    Reviewing the patient records in chart, I see that he has completed a CAT scan through a different physician at the end of last year which required follow-up in 3 to 6 months for reevaluation.  Recognizing this at this time I have ordered that follow-up CAT scan and educated the patient about the necessity of it and provided him with the tools for scheduling         Review of Systems   Constitutional:  Negative for chills and fever.   Respiratory:  Negative for cough and shortness of breath.    Cardiovascular:  Negative for chest pain and palpitations.   Endocrine: Negative for polydipsia and polyuria.       Objective   /84 (BP Location: Left arm, Patient Position: Sitting, BP Cuff Size: Adult)   Pulse 72   Temp 36.8 °C (98.3 °F) (Oral)   Wt 134 kg (296 lb)   BMI 37.00 kg/m²     Physical Exam  HENT:      Head: Normocephalic and atraumatic.      Right Ear: Tympanic membrane and ear canal normal. There is no impacted cerumen.      Left Ear: Tympanic membrane and ear canal normal. There is no impacted cerumen.       Nose: Nose normal.      Mouth/Throat:      Mouth: Mucous membranes are moist.      Pharynx: Oropharynx is clear.   Eyes:      Extraocular Movements: Extraocular movements intact.      Pupils: Pupils are equal, round, and reactive to light.   Cardiovascular:      Rate and Rhythm: Normal rate.   Pulmonary:      Effort: No respiratory distress.      Breath sounds: No wheezing, rhonchi or rales.   Musculoskeletal:      Cervical back: Neck supple. No tenderness.   Lymphadenopathy:      Cervical: No cervical adenopathy.   Neurological:      General: No focal deficit present.      Motor: Tremor present. No atrophy.      Coordination: Coordination is intact.         Assessment/Plan   Problem List Items Addressed This Visit             ICD-10-CM    Hyperparathyroidism (CMS/[facility]) E21.3    Relevant Medications    alendronate (Fosamax) 10 mg tablet    Benign essential tremor G25.0     Other Visit Diagnoses         Codes    Dysequilibrium    -  Primary R42    Relevant Medications    ipratropium (Atrovent) 21 mcg (0.03 %) nasal spray    amoxicillin-pot clavulanate (Augmentin) 875-125 mg tablet    Right lower lobe lung mass     R91.8    Relevant Orders    CT chest wo IV contrast

## 2024-04-17 ENCOUNTER — OFFICE VISIT (OUTPATIENT)
Dept: CARDIOLOGY | Facility: CLINIC | Age: 64
End: 2024-04-17
Payer: COMMERCIAL

## 2024-04-17 VITALS
OXYGEN SATURATION: 99 % | SYSTOLIC BLOOD PRESSURE: 109 MMHG | WEIGHT: 309 LBS | HEART RATE: 77 BPM | DIASTOLIC BLOOD PRESSURE: 79 MMHG | BODY MASS INDEX: 38.62 KG/M2

## 2024-04-17 DIAGNOSIS — I42.0 DILATED CARDIOMYOPATHY (MULTI): Primary | ICD-10-CM

## 2024-04-17 PROCEDURE — 99214 OFFICE O/P EST MOD 30 MIN: CPT | Performed by: INTERNAL MEDICINE

## 2024-04-17 PROCEDURE — 3078F DIAST BP <80 MM HG: CPT | Performed by: INTERNAL MEDICINE

## 2024-04-17 PROCEDURE — G0463 HOSPITAL OUTPT CLINIC VISIT: HCPCS | Performed by: INTERNAL MEDICINE

## 2024-04-17 PROCEDURE — 3074F SYST BP LT 130 MM HG: CPT | Performed by: INTERNAL MEDICINE

## 2024-04-17 RX ORDER — METOPROLOL SUCCINATE 25 MG/1
25 TABLET, EXTENDED RELEASE ORAL EVERY EVENING
Qty: 90 TABLET | Refills: 3 | Status: SHIPPED | OUTPATIENT
Start: 2024-04-17 | End: 2025-04-17

## 2024-04-17 ASSESSMENT — ENCOUNTER SYMPTOMS
OCCASIONAL FEELINGS OF UNSTEADINESS: 1
DEPRESSION: 0
LOSS OF SENSATION IN FEET: 0

## 2024-04-17 ASSESSMENT — PAIN SCALES - GENERAL: PAINLEVEL: 0-NO PAIN

## 2024-04-17 ASSESSMENT — PATIENT HEALTH QUESTIONNAIRE - PHQ9
2. FEELING DOWN, DEPRESSED OR HOPELESS: NOT AT ALL
1. LITTLE INTEREST OR PLEASURE IN DOING THINGS: NOT AT ALL
SUM OF ALL RESPONSES TO PHQ9 QUESTIONS 1 AND 2: 0

## 2024-04-17 NOTE — ASSESSMENT & PLAN NOTE
Compensated on guideline directed medical therapy and presently he does not require daily diuretic therapy.    I will convert him from metoprolol to tartrate to metoprolol succinate given his cardiomyopathy.

## 2024-04-17 NOTE — ASSESSMENT & PLAN NOTE
He has a nonischemic cardiomyopathy with cardiac catheterization previously showing mild nonobstructive coronary artery disease.    His echocardiogram in March 2024 confirms moderate global LV systolic dysfunction with left ventricular ejection fraction 35 to 40% and mild left ventricular enlargement, no other significant structural heart disease.    Continue him on guideline directed medical therapy with metoprolol succinate and discontinue his metoprolol to tartrate, continue his Entresto

## 2024-04-17 NOTE — PROGRESS NOTES
"We have to keep her late coming in and I do not have any data on her she is our third patient Subjective      Chief Complaint   Patient presents with    2 month follow up        Here for  coronary disease reassessment and since his last visit he remains angina free and has not had recurrent syncope has some daytime fatigue that can be beta-blocker related and I suggested a dose reduction.  Will also use sustained-release form of beta-blocker.    Previous: Based on his previous cardiac catheterization and December 2022 showing mild nonobstructive coronary artery disease, his abnormal nuclear stress test suggests that he may have had an artifact not a true myocardial infarction in his left ventricular apex.     He was taking a shower on the morning of admission and he suddenly became dizzy/lightheaded.  He said that during the episode his legs gave out and he fell on the shower floor.  He said after a minute or 2 he regained his strength and was able to stand and walk to the toilet where he sat down.  He said during the episode he became nauseous and his stomach \"flipped\".  He said after sitting on the toilet for a few minutes he attempted to go up the stairs.  He felt that he was able to make it up the stairs but it took him longer than usual as he became dizzy again. He then decided to call EMS.  He said after arriving to the hospital he was placed in the lobby and while waiting to be seen he became dizzy/lightheaded and slumped over in the chair.  He said after a few minutes the episode subsided.  During these episodes he denies any chest pain/palpitations/dyspnea.  He denies LOC or hitting his head.       He had a nuclear stress test Lexiscan protocol showed evidence dyskinetic apex with a fixed inferoapical perfusion defect and left ventricular ejection fraction was estimated at 35% which was probably a slight underestimation compared with his echocardiogram.  His echocardiogram showed moderate left ventricular " systolic dysfunction with left ventricular ejection fraction 40 to 45%.           Review of Systems   All other systems reviewed and are negative.       Objective   Physical Exam  Constitutional:       Appearance: Normal appearance.   HENT:      Head: Normocephalic and atraumatic.   Eyes:      Pupils: Pupils are equal, round, and reactive to light.   Cardiovascular:      Rate and Rhythm: Normal rate and regular rhythm.      Pulses: Normal pulses.      Heart sounds: Normal heart sounds.   Pulmonary:      Effort: Pulmonary effort is normal.      Breath sounds: Normal breath sounds.   Abdominal:      General: Abdomen is flat. Bowel sounds are normal.      Palpations: Abdomen is soft.   Musculoskeletal:         General: Normal range of motion.      Cervical back: Normal range of motion.   Skin:     General: Skin is warm and dry.   Neurological:      General: No focal deficit present.   Psychiatric:         Mood and Affect: Mood normal.         Judgment: Judgment normal.          Lab Review:   Not applicable    Cardiomyopathy (Multi)  He has a nonischemic cardiomyopathy with cardiac catheterization previously showing mild nonobstructive coronary artery disease.    His echocardiogram in March 2024 confirms moderate global LV systolic dysfunction with left ventricular ejection fraction 35 to 40% and mild left ventricular enlargement, no other significant structural heart disease.    Continue him on guideline directed medical therapy with metoprolol and Entresto    Systolic heart failure (Multi)  Compensated on guideline directed medical therapy and presently he does not require daily diuretic therapy.    I will convert him from metoprolol to tartrate to metoprolol succinate given his cardiomyopathy.

## 2024-04-18 ENCOUNTER — TELEPHONE (OUTPATIENT)
Dept: PRIMARY CARE | Facility: CLINIC | Age: 64
End: 2024-04-18
Payer: COMMERCIAL

## 2024-04-18 NOTE — TELEPHONE ENCOUNTER
Nora states that Oscar fell because his legs collapses under him and he is sleeping a lot and staring into space also when she talks to him he is very confused. She would like some advise on what to do because she knows that he just came to see you and didn't mention any of this to you.

## 2024-04-26 ENCOUNTER — OFFICE VISIT (OUTPATIENT)
Dept: PRIMARY CARE | Facility: CLINIC | Age: 64
End: 2024-04-26
Payer: COMMERCIAL

## 2024-04-26 VITALS
DIASTOLIC BLOOD PRESSURE: 71 MMHG | SYSTOLIC BLOOD PRESSURE: 113 MMHG | TEMPERATURE: 96.7 F | BODY MASS INDEX: 37.95 KG/M2 | WEIGHT: 305.2 LBS | HEIGHT: 75 IN | HEART RATE: 73 BPM

## 2024-04-26 DIAGNOSIS — M54.16 LUMBAR RADICULOPATHY: ICD-10-CM

## 2024-04-26 DIAGNOSIS — R29.898 RIGHT LEG WEAKNESS: Primary | ICD-10-CM

## 2024-04-26 DIAGNOSIS — G47.10 HYPERSOMNIA: ICD-10-CM

## 2024-04-26 DIAGNOSIS — M51.06 LUMBAR DISC DISORDER WITH MYELOPATHY: ICD-10-CM

## 2024-04-26 PROCEDURE — 3078F DIAST BP <80 MM HG: CPT | Performed by: INTERNAL MEDICINE

## 2024-04-26 PROCEDURE — 99214 OFFICE O/P EST MOD 30 MIN: CPT | Performed by: INTERNAL MEDICINE

## 2024-04-26 PROCEDURE — 3074F SYST BP LT 130 MM HG: CPT | Performed by: INTERNAL MEDICINE

## 2024-04-26 ASSESSMENT — ENCOUNTER SYMPTOMS
FEVER: 0
COUGH: 0
PALPITATIONS: 0
EXTREMITY WEAKNESS: 1
FATIGUE: 1
POLYDIPSIA: 0
SHORTNESS OF BREATH: 0
CHILLS: 0

## 2024-04-26 ASSESSMENT — PAIN SCALES - GENERAL: PAINLEVEL: 0-NO PAIN

## 2024-04-26 NOTE — PROGRESS NOTES
"Subjective   Patient ID: Brandin Barfield is a 63 y.o. male who presents for Fatigue, Balance Problem, and Extremity Weakness.    Pt presents with recurrent balance concerns. Examples include sudden collapse of legs out from under him, fall forward this morning when bending forward, falling sideways when work at a job and fell sideways into an ironing board. He reports no symptoms of burning or tingling in the legs or feet. He does have some intermittent numbness in the R foot and some shooting pain in the toes. On questioning it does seem to him that the R leg is the one that collapses out from him more easily. No urinary retention or fecal incontinence. HE has a hx of fracture of the lumbar vertebrae.    Also, Pt with inappropriate sleeping during daytime, hypersomnia, low sleep latency- falls asleep immediately and inappropriately at work, home, and in past while driving. BMI 38. High risk for TANNER. + chronic fatigue    Fatigue  Associated symptoms include fatigue. Pertinent negatives include no chest pain, chills, coughing or fever.   Extremity Weakness  Associated symptoms include fatigue. Pertinent negatives include no chest pain, chills, coughing or fever.        Review of Systems   Constitutional:  Positive for fatigue. Negative for chills and fever.   Respiratory:  Negative for cough and shortness of breath.    Cardiovascular:  Negative for chest pain and palpitations.   Endocrine: Negative for polydipsia and polyuria.   Musculoskeletal:  Positive for extremity weakness.       Objective   /71 (BP Location: Right arm, Patient Position: Sitting, BP Cuff Size: Large adult)   Pulse 73   Temp 35.9 °C (96.7 °F) (Temporal)   Ht 1.905 m (6' 3\")   Wt 138 kg (305 lb 3.2 oz)   BMI 38.15 kg/m²     Physical Exam  Constitutional:       Appearance: Normal appearance.   HENT:      Head: Normocephalic and atraumatic.   Eyes:      Extraocular Movements: Extraocular movements intact.      Pupils: Pupils are equal, " round, and reactive to light.   Neck:      Thyroid: No thyroid mass or thyromegaly.   Cardiovascular:      Rate and Rhythm: Normal rate and regular rhythm.      Heart sounds: No murmur heard.     No friction rub. No gallop.   Musculoskeletal:      Right lower leg: No edema.      Left lower leg: No edema.   Neurological:      Mental Status: He is alert.      Cranial Nerves: Cranial nerves 2-12 are intact.      Sensory: Sensation is intact.      Motor: No weakness (4+ strength bilateral lower extremities.).      Coordination: Coordination is intact. Romberg sign negative. Finger-Nose-Finger Test and Heel to Shin Test normal.      Deep Tendon Reflexes:      Reflex Scores:       Patellar reflexes are 1+ on the right side and 1+ on the left side.       Achilles reflexes are 1+ on the right side and 1+ on the left side.     Comments: Benign essential tremor.          Assessment/Plan   Problem List Items Addressed This Visit    None  Visit Diagnoses         Codes    Right leg weakness    -  Primary R29.898    Relevant Orders    MR lumbar spine wo IV contrast    XR lumbar spine 2-3 views    Referral to Physical Therapy    Lumbar radiculopathy     M54.16    Relevant Orders    MR lumbar spine wo IV contrast    XR lumbar spine 2-3 views    Referral to Physical Therapy    Lumbar disc disorder with myelopathy     M51.06    Relevant Orders    MR lumbar spine wo IV contrast    XR lumbar spine 2-3 views    Referral to Physical Therapy    Hypersomnia     G47.10    Relevant Orders    Home sleep apnea test (HSAT)

## 2024-05-09 ENCOUNTER — HOSPITAL ENCOUNTER (OUTPATIENT)
Dept: RADIOLOGY | Facility: HOSPITAL | Age: 64
Discharge: HOME | End: 2024-05-09
Payer: COMMERCIAL

## 2024-05-09 DIAGNOSIS — M51.06 LUMBAR DISC DISORDER WITH MYELOPATHY: ICD-10-CM

## 2024-05-09 DIAGNOSIS — R29.898 RIGHT LEG WEAKNESS: ICD-10-CM

## 2024-05-09 DIAGNOSIS — M54.16 LUMBAR RADICULOPATHY: ICD-10-CM

## 2024-05-09 PROCEDURE — 72148 MRI LUMBAR SPINE W/O DYE: CPT | Performed by: RADIOLOGY

## 2024-05-09 PROCEDURE — 72148 MRI LUMBAR SPINE W/O DYE: CPT

## 2024-05-09 PROCEDURE — 72100 X-RAY EXAM L-S SPINE 2/3 VWS: CPT

## 2024-05-09 PROCEDURE — 72100 X-RAY EXAM L-S SPINE 2/3 VWS: CPT | Performed by: STUDENT IN AN ORGANIZED HEALTH CARE EDUCATION/TRAINING PROGRAM

## 2024-05-29 ENCOUNTER — APPOINTMENT (OUTPATIENT)
Dept: PRIMARY CARE | Facility: CLINIC | Age: 64
End: 2024-05-29
Payer: COMMERCIAL

## 2024-06-30 DIAGNOSIS — G25.0 BENIGN ESSENTIAL TREMOR: ICD-10-CM

## 2024-07-01 DIAGNOSIS — G25.0 BENIGN ESSENTIAL TREMOR: ICD-10-CM

## 2024-07-01 RX ORDER — PRIMIDONE 50 MG/1
100 TABLET ORAL NIGHTLY
Qty: 180 TABLET | Refills: 2 | Status: SHIPPED | OUTPATIENT
Start: 2024-07-01

## 2024-07-01 RX ORDER — PRIMIDONE 50 MG/1
50 TABLET ORAL NIGHTLY
Qty: 30 TABLET | Refills: 1 | Status: SHIPPED | OUTPATIENT
Start: 2024-07-01 | End: 2024-07-01 | Stop reason: SDUPTHER

## 2024-07-11 ENCOUNTER — APPOINTMENT (OUTPATIENT)
Dept: PRIMARY CARE | Facility: CLINIC | Age: 64
End: 2024-07-11
Payer: COMMERCIAL

## 2024-08-02 ENCOUNTER — OFFICE VISIT (OUTPATIENT)
Dept: PRIMARY CARE | Facility: CLINIC | Age: 64
End: 2024-08-02
Payer: MEDICAID

## 2024-08-02 VITALS
BODY MASS INDEX: 38.25 KG/M2 | SYSTOLIC BLOOD PRESSURE: 110 MMHG | DIASTOLIC BLOOD PRESSURE: 72 MMHG | TEMPERATURE: 97.6 F | WEIGHT: 306 LBS | HEART RATE: 73 BPM

## 2024-08-02 DIAGNOSIS — G47.20 CIRCADIAN RHYTHM SLEEP DISORDER: ICD-10-CM

## 2024-08-02 DIAGNOSIS — G25.0 BENIGN ESSENTIAL TREMOR: ICD-10-CM

## 2024-08-02 DIAGNOSIS — I50.22 CHRONIC SYSTOLIC HEART FAILURE (MULTI): Primary | ICD-10-CM

## 2024-08-02 PROCEDURE — 3074F SYST BP LT 130 MM HG: CPT | Performed by: INTERNAL MEDICINE

## 2024-08-02 PROCEDURE — 3078F DIAST BP <80 MM HG: CPT | Performed by: INTERNAL MEDICINE

## 2024-08-02 PROCEDURE — 99214 OFFICE O/P EST MOD 30 MIN: CPT | Performed by: INTERNAL MEDICINE

## 2024-08-02 RX ORDER — TALC
POWDER (GRAM) TOPICAL
Qty: 21 TABLET | Refills: 0 | Status: SHIPPED | OUTPATIENT
Start: 2024-08-02

## 2024-08-02 RX ORDER — METFORMIN HYDROCHLORIDE 500 MG/1
500 TABLET ORAL
COMMUNITY
Start: 2024-07-10

## 2024-08-02 RX ORDER — HYDROXYZINE PAMOATE 25 MG/1
CAPSULE ORAL
Qty: 20 CAPSULE | Refills: 0 | Status: SHIPPED | OUTPATIENT
Start: 2024-08-02

## 2024-08-02 RX ORDER — SACUBITRIL AND VALSARTAN 24; 26 MG/1; MG/1
1 TABLET, FILM COATED ORAL 2 TIMES DAILY
Qty: 60 TABLET | Refills: 6 | Status: SHIPPED | OUTPATIENT
Start: 2024-08-02

## 2024-08-02 RX ORDER — PRIMIDONE 125 MG/1
125 TABLET ORAL NIGHTLY
Qty: 90 TABLET | Refills: 2 | Status: SHIPPED | OUTPATIENT
Start: 2024-08-02

## 2024-08-02 ASSESSMENT — ENCOUNTER SYMPTOMS
CHILLS: 0
COUGH: 0
PALPITATIONS: 0
SHORTNESS OF BREATH: 0
POLYDIPSIA: 0
FEVER: 0

## 2024-08-02 NOTE — PROGRESS NOTES
Subjective   Patient ID: Brandin Barfield is a 63 y.o. male who presents for Follow-up.    Male with essential tremor presents today for follow-up.  The adjusted dose to 100 mg, 2 tablets of 50 mg, improved his tremor but seems to still have a very active tremor that is quite bothersome to him and impacts his ability for fine motor skills.  Further titration of the medication considered and advised increased dose provided he still has left over 50s which we may ultimately need pending his response to the new tablet.  No balance falls or other motor skill issues.    Increased levels of stress and depression secondary to significant life changes including loss of his job back in May.  He had difficulties navigating the unemployment system because of delays in getting on it which is created undue financial burden.  He is on a Medicaid assistance plan for insurance and so medications are not currently only a significant concern for him.  He is working with a behavioral health specialist to his currently adjusting his medications and assisting him with further changes in his regimen to better assist with managing the escalation in his recent depression symptoms.      Finally, the patient has had significant disruption to his sleep patterns as a result of being unemployed.  He is not sleeping nightly more than approximately 3 to 4 hours.  There is significant disruption to his circadian rhythm as he is not getting tired and going to sleep at a consistent bedtime.  Hours very significantly.  Significant time spent in education of the patient about circadian rhythm sleep disorder and setting a specific bedtime and working in a capacity to develop a consistent behavior.  Melatonin provided to assist with expediting getting back into a sleep rhythm, Vistaril for as needed use disappoints sleep, also believe it would be beneficial at helping his anxiety symptoms which may be contributing based on the patient's history as  above.         Review of Systems   Constitutional:  Negative for chills and fever.   Respiratory:  Negative for cough and shortness of breath.    Cardiovascular:  Negative for chest pain and palpitations.   Endocrine: Negative for polydipsia and polyuria.       Objective   /72 (BP Location: Right arm, Patient Position: Sitting, BP Cuff Size: Large adult)   Pulse 73   Temp 36.4 °C (97.6 °F) (Temporal)   Wt 139 kg (306 lb)   BMI 38.25 kg/m²     Physical Exam  Constitutional:       Appearance: Normal appearance.   HENT:      Head: Normocephalic and atraumatic.   Eyes:      Extraocular Movements: Extraocular movements intact.      Pupils: Pupils are equal, round, and reactive to light.   Neck:      Thyroid: No thyroid mass or thyromegaly.      Vascular: No carotid bruit.   Cardiovascular:      Rate and Rhythm: Normal rate and regular rhythm.      Heart sounds: No murmur heard.     No friction rub. No gallop.   Pulmonary:      Effort: No respiratory distress.      Breath sounds: No wheezing, rhonchi or rales.   Musculoskeletal:      Cervical back: Neck supple.      Right lower leg: No edema.      Left lower leg: No edema.   Lymphadenopathy:      Cervical: No cervical adenopathy.   Neurological:      Mental Status: He is alert.         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

## 2024-08-14 ENCOUNTER — APPOINTMENT (OUTPATIENT)
Dept: PRIMARY CARE | Facility: CLINIC | Age: 64
End: 2024-08-14
Payer: COMMERCIAL

## 2024-09-01 ASSESSMENT — ENCOUNTER SYMPTOMS
AURA: 1
SHORTNESS OF BREATH: 1
NECK PAIN: 1
WEAKNESS: 1
DIZZINESS: 1
VOMITING: 0
NEUROLOGIC COMPLAINT: 1
LOSS OF BALANCE: 1
DIAPHORESIS: 1
CONFUSION: 1
FATIGUE: 1
MEMORY LOSS: 1
FOCAL WEAKNESS: 1
PALPITATIONS: 0
FOCAL SENSORY LOSS: 1
ALTERED MENTAL STATUS: 1
LIGHT-HEADEDNESS: 1
ABDOMINAL PAIN: 0
VISUAL CHANGE: 1
BOWEL INCONTINENCE: 0
SLURRED SPEECH: 0
HEADACHES: 1
FEVER: 0
NEAR-SYNCOPE: 0
CLUMSINESS: 1
BACK PAIN: 1
VERTIGO: 0
NAUSEA: 0

## 2024-09-04 ENCOUNTER — HOSPITAL ENCOUNTER (OUTPATIENT)
Dept: RADIOLOGY | Facility: CLINIC | Age: 64
Discharge: HOME | End: 2024-09-04
Payer: MEDICAID

## 2024-09-04 ENCOUNTER — APPOINTMENT (OUTPATIENT)
Dept: PRIMARY CARE | Facility: CLINIC | Age: 64
End: 2024-09-04
Payer: MEDICAID

## 2024-09-04 VITALS
HEART RATE: 102 BPM | BODY MASS INDEX: 38.5 KG/M2 | TEMPERATURE: 96.9 F | DIASTOLIC BLOOD PRESSURE: 88 MMHG | SYSTOLIC BLOOD PRESSURE: 125 MMHG | WEIGHT: 308 LBS

## 2024-09-04 DIAGNOSIS — R05.8 PRODUCTIVE COUGH: ICD-10-CM

## 2024-09-04 DIAGNOSIS — J20.9 ACUTE BRONCHITIS, UNSPECIFIED ORGANISM: ICD-10-CM

## 2024-09-04 DIAGNOSIS — N35.919 STRICTURE OF MALE URETHRA, UNSPECIFIED STRICTURE TYPE: ICD-10-CM

## 2024-09-04 DIAGNOSIS — F33.41 RECURRENT MAJOR DEPRESSIVE DISORDER, IN PARTIAL REMISSION (CMS-HCC): ICD-10-CM

## 2024-09-04 DIAGNOSIS — R06.09 DOE (DYSPNEA ON EXERTION): ICD-10-CM

## 2024-09-04 DIAGNOSIS — G25.0 BENIGN ESSENTIAL TREMOR: Primary | ICD-10-CM

## 2024-09-04 PROCEDURE — 99214 OFFICE O/P EST MOD 30 MIN: CPT | Performed by: INTERNAL MEDICINE

## 2024-09-04 PROCEDURE — 71046 X-RAY EXAM CHEST 2 VIEWS: CPT | Performed by: RADIOLOGY

## 2024-09-04 PROCEDURE — 3079F DIAST BP 80-89 MM HG: CPT | Performed by: INTERNAL MEDICINE

## 2024-09-04 PROCEDURE — 3074F SYST BP LT 130 MM HG: CPT | Performed by: INTERNAL MEDICINE

## 2024-09-04 PROCEDURE — 71046 X-RAY EXAM CHEST 2 VIEWS: CPT

## 2024-09-04 RX ORDER — BUPROPION HYDROCHLORIDE 300 MG/1
300 TABLET ORAL DAILY
Qty: 90 TABLET | Refills: 1 | Status: SHIPPED | OUTPATIENT
Start: 2024-09-04 | End: 2025-03-03

## 2024-09-04 RX ORDER — BUPROPION HYDROCHLORIDE 150 MG/1
150 TABLET ORAL EVERY MORNING
Qty: 90 TABLET | Refills: 1 | Status: SHIPPED | OUTPATIENT
Start: 2024-09-04 | End: 2025-03-03

## 2024-09-04 RX ORDER — METHYLPREDNISOLONE 4 MG/1
TABLET ORAL
Qty: 21 TABLET | Refills: 0 | Status: SHIPPED | OUTPATIENT
Start: 2024-09-04 | End: 2024-09-11

## 2024-09-04 RX ORDER — PRIMIDONE 50 MG/1
50 TABLET ORAL NIGHTLY
Qty: 90 TABLET | Refills: 3 | Status: SHIPPED | OUTPATIENT
Start: 2024-09-04 | End: 2025-09-04

## 2024-09-04 RX ORDER — AZITHROMYCIN 250 MG/1
TABLET, FILM COATED ORAL
Qty: 6 TABLET | Refills: 0 | Status: SHIPPED | OUTPATIENT
Start: 2024-09-04 | End: 2024-09-09

## 2024-09-04 RX ORDER — PRIMIDONE 125 MG/1
125 TABLET ORAL NIGHTLY
Qty: 90 TABLET | Refills: 2 | Status: SHIPPED | OUTPATIENT
Start: 2024-09-04

## 2024-09-04 ASSESSMENT — ENCOUNTER SYMPTOMS
FOCAL SENSORY LOSS: 1
CONFUSION: 1
ABDOMINAL PAIN: 0
NAUSEA: 0
LOSS OF BALANCE: 1
CLUMSINESS: 1
DIAPHORESIS: 1
LIGHT-HEADEDNESS: 1
AURA: 1
MEMORY LOSS: 1
SHORTNESS OF BREATH: 1
VISUAL CHANGE: 1
WEAKNESS: 1
NECK PAIN: 1
ALTERED MENTAL STATUS: 1
FEVER: 0
FOCAL WEAKNESS: 1
DIZZINESS: 1
NEAR-SYNCOPE: 0
VOMITING: 0
SLURRED SPEECH: 0
HEADACHES: 1
PALPITATIONS: 0
BACK PAIN: 1
VERTIGO: 0
FATIGUE: 1
BOWEL INCONTINENCE: 0
NEUROLOGIC COMPLAINT: 1

## 2024-09-04 ASSESSMENT — PAIN SCALES - GENERAL: PAINLEVEL: 3

## 2024-09-04 NOTE — PROGRESS NOTES
Subjective   Patient ID: Brandin Barfield is a 63 y.o. male who presents for Shaking.    1 week hx of cough and chest congestion, cough is productive with sputum- white to yellow. No fevers, chills, or sweats. No chest pain.  Some BALDWIN    Tremor improved on greater dose of primidone- still having days when very active. Rx advised for further adjustment.      Patient also mentions that he has been noticing a chronic split stream of his urine from the urethral meatus.  When he adjusts the skin or the direction of the penis stretching or otherwise it does not change the Split Stream or in some cases can actually make it worse.  No significant pain or inability to empty his bladder.  Chronic in nature no specific known causes.         Acute Neurological Problem  The patient's primary symptoms include an altered mental status, clumsiness, focal sensory loss, focal weakness, a loss of balance, memory loss, a visual change and weakness. The patient's pertinent negatives include no near-syncope, slurred speech or syncope. This is a chronic problem. The current episode started more than 1 year ago. The neurological problem developed gradually. The problem has been waxing and waning since onset. There was lower extremity focality noted. Associated symptoms include an auditory change, an aura, back pain, bladder incontinence, confusion, diaphoresis, dizziness, fatigue, headaches, light-headedness, neck pain and shortness of breath. Pertinent negatives include no abdominal pain, bowel incontinence, chest pain, fever, nausea, palpitations, vertigo or vomiting. Past treatments include acetaminophen, aspirin, bed rest, medication and position change. The treatment provided moderate relief.        Review of Systems   Constitutional:  Positive for diaphoresis and fatigue. Negative for fever.   Respiratory:  Positive for shortness of breath.    Cardiovascular:  Negative for chest pain, palpitations and near-syncope.   Gastrointestinal:   Negative for abdominal pain, bowel incontinence, nausea and vomiting.   Genitourinary:  Positive for bladder incontinence.   Musculoskeletal:  Positive for back pain and neck pain.   Neurological:  Positive for dizziness, focal weakness, weakness, light-headedness, headaches and loss of balance. Negative for vertigo and syncope.   Psychiatric/Behavioral:  Positive for confusion and memory loss.        Objective   /88   Pulse 102   Temp 36.1 °C (96.9 °F) (Temporal)   Wt 140 kg (308 lb)   BMI 38.50 kg/m²     Physical Exam  Constitutional:       Appearance: Normal appearance.   HENT:      Head: Normocephalic and atraumatic.   Eyes:      Extraocular Movements: Extraocular movements intact.      Pupils: Pupils are equal, round, and reactive to light.   Neck:      Thyroid: No thyroid mass or thyromegaly.   Cardiovascular:      Rate and Rhythm: Normal rate and regular rhythm.      Heart sounds: No murmur heard.     No friction rub. No gallop.   Pulmonary:      Effort: No respiratory distress.      Breath sounds: Wheezing present. No rhonchi or rales.   Musculoskeletal:      Cervical back: Neck supple.      Right lower leg: No edema.      Left lower leg: No edema.   Neurological:      Mental Status: He is alert.   Psychiatric:         Speech: Speech is rapid and pressured and tangential.         Assessment/Plan   Problem List Items Addressed This Visit             ICD-10-CM    Benign essential tremor - Primary G25.0    Relevant Medications    primidone 125 mg tablet    primidone (Mysoline) 50 mg tablet     Other Visit Diagnoses         Codes    Recurrent major depressive disorder, in partial remission (CMS-HCC)     F33.41    Relevant Medications    buPROPion XL (Wellbutrin XL) 300 mg 24 hr tablet    buPROPion XL (Wellbutrin XL) 150 mg 24 hr tablet    Acute bronchitis, unspecified organism     J20.9    Relevant Medications    methylPREDNISolone (Medrol Dospak) 4 mg tablets    azithromycin (Zithromax) 250 mg tablet     Productive cough     R05.8    Relevant Medications    methylPREDNISolone (Medrol Dospak) 4 mg tablets    azithromycin (Zithromax) 250 mg tablet    Other Relevant Orders    XR chest 2 views    BALDWIN (dyspnea on exertion)     R06.09    Relevant Medications    methylPREDNISolone (Medrol Dospak) 4 mg tablets    azithromycin (Zithromax) 250 mg tablet    Other Relevant Orders    XR chest 2 views    Stricture of male urethra, unspecified stricture type     N35.919    Relevant Orders    Referral to Urology

## 2024-10-22 ENCOUNTER — APPOINTMENT (OUTPATIENT)
Dept: CARDIOLOGY | Facility: CLINIC | Age: 64
End: 2024-10-22
Payer: MEDICAID

## 2024-11-07 ENCOUNTER — APPOINTMENT (OUTPATIENT)
Dept: PRIMARY CARE | Facility: CLINIC | Age: 64
End: 2024-11-07
Payer: MEDICAID

## 2024-11-07 DIAGNOSIS — G25.0 BENIGN ESSENTIAL TREMOR: Primary | ICD-10-CM

## 2024-11-07 RX ORDER — PRIMIDONE 250 MG/1
250 TABLET ORAL NIGHTLY
Qty: 90 TABLET | Refills: 2 | Status: SHIPPED | OUTPATIENT
Start: 2024-11-07 | End: 2025-08-04

## 2024-11-11 ASSESSMENT — ENCOUNTER SYMPTOMS
CONFUSION: 0
NEAR-SYNCOPE: 0
LOSS OF BALANCE: 1
CLUMSINESS: 0
SHORTNESS OF BREATH: 1
NECK PAIN: 1
DIAPHORESIS: 0
BOWEL INCONTINENCE: 0
FEVER: 0
VISUAL CHANGE: 0
FOCAL SENSORY LOSS: 1
SLURRED SPEECH: 0
DIZZINESS: 0
BACK PAIN: 1
WEAKNESS: 1
AURA: 0
FATIGUE: 1
VERTIGO: 0
ALTERED MENTAL STATUS: 0
MEMORY LOSS: 1
LIGHT-HEADEDNESS: 1
HEADACHES: 1
PALPITATIONS: 0
NEUROLOGIC COMPLAINT: 1
FOCAL WEAKNESS: 1
ABDOMINAL PAIN: 1

## 2024-11-12 ENCOUNTER — APPOINTMENT (OUTPATIENT)
Dept: PRIMARY CARE | Facility: CLINIC | Age: 64
End: 2024-11-12
Payer: MEDICAID

## 2024-11-12 VITALS
WEIGHT: 304.1 LBS | DIASTOLIC BLOOD PRESSURE: 86 MMHG | HEART RATE: 73 BPM | SYSTOLIC BLOOD PRESSURE: 127 MMHG | TEMPERATURE: 97.2 F | BODY MASS INDEX: 37.81 KG/M2 | HEIGHT: 75 IN

## 2024-11-12 DIAGNOSIS — E83.52 HYPERCALCEMIA: ICD-10-CM

## 2024-11-12 DIAGNOSIS — E21.0 PRIMARY HYPERPARATHYROIDISM (MULTI): ICD-10-CM

## 2024-11-12 DIAGNOSIS — G25.0 BENIGN ESSENTIAL TREMOR: Primary | ICD-10-CM

## 2024-11-12 DIAGNOSIS — I50.22 CHRONIC SYSTOLIC HEART FAILURE: ICD-10-CM

## 2024-11-12 DIAGNOSIS — F33.2 MAJOR DEPRESSIVE DISORDER, RECURRENT SEVERE WITHOUT PSYCHOTIC FEATURES (MULTI): ICD-10-CM

## 2024-11-12 PROCEDURE — 3008F BODY MASS INDEX DOCD: CPT | Performed by: INTERNAL MEDICINE

## 2024-11-12 PROCEDURE — 3079F DIAST BP 80-89 MM HG: CPT | Performed by: INTERNAL MEDICINE

## 2024-11-12 PROCEDURE — 3074F SYST BP LT 130 MM HG: CPT | Performed by: INTERNAL MEDICINE

## 2024-11-12 PROCEDURE — 99214 OFFICE O/P EST MOD 30 MIN: CPT | Performed by: INTERNAL MEDICINE

## 2024-11-12 RX ORDER — DULOXETIN HYDROCHLORIDE 30 MG/1
30 CAPSULE, DELAYED RELEASE ORAL 2 TIMES DAILY
COMMUNITY

## 2024-11-12 ASSESSMENT — ENCOUNTER SYMPTOMS
LOSS OF BALANCE: 1
WEAKNESS: 1
FATIGUE: 1
LIGHT-HEADEDNESS: 1
NECK PAIN: 1
VERTIGO: 0
AURA: 0
ABDOMINAL PAIN: 1
CONFUSION: 0
DIAPHORESIS: 0
NEAR-SYNCOPE: 0
HEADACHES: 1
FOCAL WEAKNESS: 1
SLURRED SPEECH: 0
VISUAL CHANGE: 0
FOCAL SENSORY LOSS: 1
BACK PAIN: 1
ALTERED MENTAL STATUS: 0
DIZZINESS: 0
CLUMSINESS: 0
BOWEL INCONTINENCE: 0
SHORTNESS OF BREATH: 1
MEMORY LOSS: 1
NEUROLOGIC COMPLAINT: 1
FEVER: 0
PALPITATIONS: 0

## 2024-11-12 ASSESSMENT — PAIN SCALES - GENERAL: PAINLEVEL_OUTOF10: 4

## 2024-11-12 NOTE — ASSESSMENT & PLAN NOTE
Clinically stable. Rx refilled. Lacking aldactone, but Cr/Cl declining- monitoring alone at this time. Otherwise on BB, Entresto, and SGLT-2.   Orders:    empagliflozin (Jardiance) 10 mg; Take 1 tablet (10 mg) by mouth once daily.

## 2024-11-12 NOTE — PROGRESS NOTES
Subjective   Patient ID: Brandin Barfield is a 64 y.o. male who presents for Follow-up.    + intention tremor, improved through titration of primidone. Rx tolerated well without current concerns through the gradually increased dosing.     Working with [facility], meds currently in review and adjustment. Med list reflects current use. Updated today.     Sleep study delayed by multiple issues. Still want it, awaiting improvement in other health issues before we revisit.     Independent review of outside labs today.     Acute Neurological Problem  The patient's primary symptoms include focal sensory loss, focal weakness, a loss of balance, memory loss and weakness. The patient's pertinent negatives include no altered mental status, clumsiness, near-syncope, slurred speech, syncope or visual change. This is a chronic problem. The current episode started more than 1 year ago. The neurological problem developed gradually. The problem has been waxing and waning since onset. There was lower extremity focality noted. Associated symptoms include abdominal pain, an auditory change, back pain, chest pain, fatigue, headaches, light-headedness, neck pain and shortness of breath. Pertinent negatives include no aura, bladder incontinence, bowel incontinence, confusion, diaphoresis, dizziness, fever, palpitations or vertigo. Past treatments include acetaminophen, bed rest and position change. The treatment provided mild relief.        Review of Systems   Constitutional:  Positive for fatigue. Negative for diaphoresis and fever.   Respiratory:  Positive for shortness of breath.    Cardiovascular:  Positive for chest pain. Negative for palpitations and near-syncope.   Gastrointestinal:  Positive for abdominal pain. Negative for bowel incontinence.   Genitourinary:  Negative for bladder incontinence.   Musculoskeletal:  Positive for back pain and neck pain.   Neurological:  Positive for focal weakness, weakness,  "light-headedness, headaches and loss of balance. Negative for dizziness, vertigo and syncope.   Psychiatric/Behavioral:  Positive for memory loss. Negative for confusion.        Objective   /86   Pulse 73   Temp 36.2 °C (97.2 °F) (Temporal)   Ht 1.905 m (6' 3\")   Wt 138 kg (304 lb 1.6 oz)   BMI 38.01 kg/m²     Physical Exam  Constitutional:       Appearance: Normal appearance.   HENT:      Head: Normocephalic and atraumatic.   Eyes:      Extraocular Movements: Extraocular movements intact.      Pupils: Pupils are equal, round, and reactive to light.   Neck:      Thyroid: No thyroid mass or thyromegaly.   Cardiovascular:      Rate and Rhythm: Normal rate and regular rhythm.      Heart sounds: No murmur heard.     No friction rub. No gallop.   Pulmonary:      Effort: No respiratory distress.      Breath sounds: No wheezing, rhonchi or rales.   Musculoskeletal:      Cervical back: Neck supple.      Right lower leg: No edema.      Left lower leg: No edema.   Neurological:      Mental Status: He is alert.      Comments: Positive for intention tremor         Assessment/Plan   Assessment & Plan  Benign essential tremor  Improved on Primidone titration.        Primary hyperparathyroidism (Multi)         Hypercalcemia  Stable, hyperpara, on Bisphos.        Chronic systolic heart failure  Clinically stable. Rx refilled. Lacking aldactone, but Cr/Cl declining- monitoring alone at this time. Otherwise on BB, Entresto, and SGLT-2.   Orders:    empagliflozin (Jardiance) 10 mg; Take 1 tablet (10 mg) by mouth once daily.    Major depressive disorder, recurrent severe without psychotic features (Multi)  Specialist care, titrating medications.               "

## 2024-11-27 DIAGNOSIS — R73.02 IGT (IMPAIRED GLUCOSE TOLERANCE): Primary | ICD-10-CM

## 2024-11-27 RX ORDER — METFORMIN HYDROCHLORIDE 500 MG/1
500 TABLET, EXTENDED RELEASE ORAL
Qty: 90 TABLET | Refills: 3 | Status: SHIPPED | OUTPATIENT
Start: 2024-11-27 | End: 2025-11-27

## 2024-12-27 DIAGNOSIS — E21.3 HYPERPARATHYROIDISM (MULTI): ICD-10-CM

## 2024-12-27 RX ORDER — ALENDRONATE SODIUM 10 MG/1
10 TABLET ORAL DAILY
Qty: 90 TABLET | Refills: 2 | Status: SHIPPED | OUTPATIENT
Start: 2024-12-27

## 2025-01-08 DIAGNOSIS — E21.3 HYPERPARATHYROIDISM (MULTI): ICD-10-CM

## 2025-01-08 RX ORDER — ALENDRONATE SODIUM 10 MG/1
10 TABLET ORAL DAILY
Qty: 90 TABLET | Refills: 1 | Status: SHIPPED | OUTPATIENT
Start: 2025-01-08

## 2025-01-10 DIAGNOSIS — G25.0 BENIGN ESSENTIAL TREMOR: ICD-10-CM

## 2025-01-12 RX ORDER — PRIMIDONE 250 MG/1
250 TABLET ORAL NIGHTLY
Qty: 90 TABLET | Refills: 2 | Status: SHIPPED | OUTPATIENT
Start: 2025-01-12 | End: 2025-10-09

## 2025-01-13 ASSESSMENT — ENCOUNTER SYMPTOMS
NECK PAIN: 1
AURA: 0
VISUAL CHANGE: 1
FEVER: 0
SLURRED SPEECH: 1
FATIGUE: 1
WEAKNESS: 1
VERTIGO: 0
VOMITING: 0
DIAPHORESIS: 0
DIZZINESS: 0
BACK PAIN: 1
SHORTNESS OF BREATH: 1
LIGHT-HEADEDNESS: 0
NAUSEA: 0
FOCAL SENSORY LOSS: 1
NEUROLOGIC COMPLAINT: 1
FOCAL WEAKNESS: 1
NEAR-SYNCOPE: 0
ALTERED MENTAL STATUS: 1
BOWEL INCONTINENCE: 0
CONFUSION: 1
LOSS OF BALANCE: 1
HEADACHES: 1
PALPITATIONS: 0
CLUMSINESS: 0
MEMORY LOSS: 1
ABDOMINAL PAIN: 0

## 2025-01-14 ENCOUNTER — LAB (OUTPATIENT)
Dept: LAB | Facility: LAB | Age: 65
End: 2025-01-14
Payer: MEDICAID

## 2025-01-14 ENCOUNTER — APPOINTMENT (OUTPATIENT)
Dept: PRIMARY CARE | Facility: CLINIC | Age: 65
End: 2025-01-14
Payer: MEDICAID

## 2025-01-14 VITALS
DIASTOLIC BLOOD PRESSURE: 74 MMHG | BODY MASS INDEX: 40 KG/M2 | TEMPERATURE: 97.2 F | HEART RATE: 85 BPM | SYSTOLIC BLOOD PRESSURE: 121 MMHG | WEIGHT: 315 LBS

## 2025-01-14 DIAGNOSIS — I50.22 CHRONIC SYSTOLIC HEART FAILURE: Primary | ICD-10-CM

## 2025-01-14 DIAGNOSIS — E21.3 HYPERPARATHYROIDISM (MULTI): ICD-10-CM

## 2025-01-14 DIAGNOSIS — E55.9 VITAMIN D DEFICIENCY: ICD-10-CM

## 2025-01-14 DIAGNOSIS — I50.22 CHRONIC SYSTOLIC HEART FAILURE: ICD-10-CM

## 2025-01-14 DIAGNOSIS — G25.0 BENIGN ESSENTIAL TREMOR: ICD-10-CM

## 2025-01-14 DIAGNOSIS — F33.41 RECURRENT MAJOR DEPRESSIVE DISORDER, IN PARTIAL REMISSION (CMS-HCC): ICD-10-CM

## 2025-01-14 DIAGNOSIS — R73.02 IGT (IMPAIRED GLUCOSE TOLERANCE): ICD-10-CM

## 2025-01-14 DIAGNOSIS — I10 HTN (HYPERTENSION), BENIGN: ICD-10-CM

## 2025-01-14 DIAGNOSIS — E83.52 HYPERCALCEMIA: ICD-10-CM

## 2025-01-14 LAB
25(OH)D3 SERPL-MCNC: 30 NG/ML (ref 30–100)
ALBUMIN SERPL BCP-MCNC: 4.2 G/DL (ref 3.4–5)
ALP SERPL-CCNC: 88 U/L (ref 33–136)
ALT SERPL W P-5'-P-CCNC: 22 U/L (ref 10–52)
ANION GAP SERPL CALC-SCNC: 12 MMOL/L (ref 10–20)
AST SERPL W P-5'-P-CCNC: 18 U/L (ref 9–39)
BASOPHILS # BLD AUTO: 0.08 X10*3/UL (ref 0–0.1)
BASOPHILS NFR BLD AUTO: 1.2 %
BILIRUB SERPL-MCNC: 0.2 MG/DL (ref 0–1.2)
BUN SERPL-MCNC: 26 MG/DL (ref 6–23)
CALCIUM SERPL-MCNC: 11.2 MG/DL (ref 8.6–10.6)
CHLORIDE SERPL-SCNC: 106 MMOL/L (ref 98–107)
CO2 SERPL-SCNC: 28 MMOL/L (ref 21–32)
CREAT SERPL-MCNC: 1.4 MG/DL (ref 0.5–1.3)
EGFRCR SERPLBLD CKD-EPI 2021: 56 ML/MIN/1.73M*2
EOSINOPHIL # BLD AUTO: 0.36 X10*3/UL (ref 0–0.7)
EOSINOPHIL NFR BLD AUTO: 5.5 %
ERYTHROCYTE [DISTWIDTH] IN BLOOD BY AUTOMATED COUNT: 13.7 % (ref 11.5–14.5)
EST. AVERAGE GLUCOSE BLD GHB EST-MCNC: 108 MG/DL
GLUCOSE SERPL-MCNC: 120 MG/DL (ref 74–99)
HBA1C MFR BLD: 5.4 %
HCT VFR BLD AUTO: 44.9 % (ref 41–52)
HGB BLD-MCNC: 14.1 G/DL (ref 13.5–17.5)
IMM GRANULOCYTES # BLD AUTO: 0.01 X10*3/UL (ref 0–0.7)
IMM GRANULOCYTES NFR BLD AUTO: 0.2 % (ref 0–0.9)
LYMPHOCYTES # BLD AUTO: 1.43 X10*3/UL (ref 1.2–4.8)
LYMPHOCYTES NFR BLD AUTO: 21.8 %
MCH RBC QN AUTO: 28.1 PG (ref 26–34)
MCHC RBC AUTO-ENTMCNC: 31.4 G/DL (ref 32–36)
MCV RBC AUTO: 90 FL (ref 80–100)
MONOCYTES # BLD AUTO: 0.7 X10*3/UL (ref 0.1–1)
MONOCYTES NFR BLD AUTO: 10.7 %
NEUTROPHILS # BLD AUTO: 3.98 X10*3/UL (ref 1.2–7.7)
NEUTROPHILS NFR BLD AUTO: 60.6 %
NRBC BLD-RTO: 0 /100 WBCS (ref 0–0)
PLATELET # BLD AUTO: 279 X10*3/UL (ref 150–450)
POTASSIUM SERPL-SCNC: 4.8 MMOL/L (ref 3.5–5.3)
PROT SERPL-MCNC: 7 G/DL (ref 6.4–8.2)
PSA SERPL-MCNC: 4.57 NG/ML
PTH-INTACT SERPL-MCNC: 130.7 PG/ML (ref 18.5–88)
RBC # BLD AUTO: 5.01 X10*6/UL (ref 4.5–5.9)
SODIUM SERPL-SCNC: 141 MMOL/L (ref 136–145)
TSH SERPL-ACNC: 1.11 MIU/L (ref 0.44–3.98)
WBC # BLD AUTO: 6.6 X10*3/UL (ref 4.4–11.3)

## 2025-01-14 PROCEDURE — 3074F SYST BP LT 130 MM HG: CPT | Performed by: INTERNAL MEDICINE

## 2025-01-14 PROCEDURE — 85025 COMPLETE CBC W/AUTO DIFF WBC: CPT

## 2025-01-14 PROCEDURE — 83970 ASSAY OF PARATHORMONE: CPT

## 2025-01-14 PROCEDURE — 84443 ASSAY THYROID STIM HORMONE: CPT

## 2025-01-14 PROCEDURE — 83036 HEMOGLOBIN GLYCOSYLATED A1C: CPT

## 2025-01-14 PROCEDURE — 3078F DIAST BP <80 MM HG: CPT | Performed by: INTERNAL MEDICINE

## 2025-01-14 PROCEDURE — 80053 COMPREHEN METABOLIC PANEL: CPT

## 2025-01-14 PROCEDURE — 1036F TOBACCO NON-USER: CPT | Performed by: INTERNAL MEDICINE

## 2025-01-14 PROCEDURE — 84153 ASSAY OF PSA TOTAL: CPT

## 2025-01-14 PROCEDURE — 82306 VITAMIN D 25 HYDROXY: CPT

## 2025-01-14 PROCEDURE — 99214 OFFICE O/P EST MOD 30 MIN: CPT | Performed by: INTERNAL MEDICINE

## 2025-01-14 RX ORDER — SACUBITRIL AND VALSARTAN 24; 26 MG/1; MG/1
1 TABLET, FILM COATED ORAL 2 TIMES DAILY
Qty: 60 TABLET | Refills: 6 | Status: SHIPPED | OUTPATIENT
Start: 2025-01-14

## 2025-01-14 RX ORDER — METFORMIN HYDROCHLORIDE 500 MG/1
500 TABLET, EXTENDED RELEASE ORAL
Qty: 90 TABLET | Refills: 3 | Status: SHIPPED | OUTPATIENT
Start: 2025-01-14 | End: 2026-01-14

## 2025-01-14 RX ORDER — ALENDRONATE SODIUM 10 MG/1
10 TABLET ORAL DAILY
Qty: 90 TABLET | Refills: 1 | Status: SHIPPED | OUTPATIENT
Start: 2025-01-14

## 2025-01-14 RX ORDER — BUPROPION HYDROCHLORIDE 150 MG/1
150 TABLET ORAL EVERY MORNING
Qty: 90 TABLET | Refills: 1 | Status: SHIPPED | OUTPATIENT
Start: 2025-01-14 | End: 2025-07-13

## 2025-01-14 RX ORDER — SPIRONOLACTONE 25 MG/1
25 TABLET ORAL DAILY
Qty: 90 TABLET | Refills: 2 | Status: SHIPPED | OUTPATIENT
Start: 2025-01-14 | End: 2025-10-11

## 2025-01-14 RX ORDER — PALIPERIDONE 1.5 MG/1
1 TABLET, EXTENDED RELEASE ORAL NIGHTLY
COMMUNITY
Start: 2024-12-05

## 2025-01-14 RX ORDER — BUPROPION HYDROCHLORIDE 300 MG/1
300 TABLET ORAL DAILY
Qty: 90 TABLET | Refills: 1 | Status: SHIPPED | OUTPATIENT
Start: 2025-01-14 | End: 2025-07-13

## 2025-01-14 RX ORDER — PRIMIDONE 250 MG/1
250 TABLET ORAL NIGHTLY
Qty: 90 TABLET | Refills: 2 | Status: SHIPPED | OUTPATIENT
Start: 2025-01-14 | End: 2025-10-11

## 2025-01-14 ASSESSMENT — ENCOUNTER SYMPTOMS
VERTIGO: 0
NECK PAIN: 1
NAUSEA: 0
FEVER: 0
SLURRED SPEECH: 1
BACK PAIN: 1
DIZZINESS: 0
ABDOMINAL PAIN: 0
VOMITING: 0
NEUROLOGIC COMPLAINT: 1
BOWEL INCONTINENCE: 0
NEAR-SYNCOPE: 0
MEMORY LOSS: 1
PALPITATIONS: 0
CLUMSINESS: 0
DIAPHORESIS: 0
HEADACHES: 1
CONFUSION: 1
LIGHT-HEADEDNESS: 0
FOCAL SENSORY LOSS: 1
LOSS OF BALANCE: 1
FATIGUE: 1
WEAKNESS: 1
FOCAL WEAKNESS: 1
ALTERED MENTAL STATUS: 1
SHORTNESS OF BREATH: 1
VISUAL CHANGE: 1
AURA: 0

## 2025-01-14 NOTE — ASSESSMENT & PLAN NOTE
Orders:    Vitamin D 25-Hydroxy,Total (for eval of Vitamin D levels); Future    Comprehensive Metabolic Panel; Future    Hemoglobin A1C; Future    Prostate Specific Antigen, Screen; Future    CBC and Auto Differential; Future    TSH with reflex to Free T4 if abnormal; Future    Albumin-Creatinine Ratio, Urine Random; Future    PTH, intact; Future

## 2025-01-14 NOTE — PROGRESS NOTES
Subjective   Patient ID: Brandin Barfield is a 64 y.o. male who presents for Follow-up.    The Essential Tremor has improved significantly after increasing the Primidone to 250mg nightly. Tolerating very well, and through titration he has done well.     Subjective  Brandin Barfield is a 64 y.o. male who presents for follow-up of congestive heart failure. Current symptoms include: fatigue and palpitations. He denies chest pain, chest pressure/discomfort, irregular heart beat, near-syncope, orthopnea, syncope, and tachypnea. He states he is compliant most of the time with his medications. He states he is compliant most of the time with his diet.    Reassess blood panel for known chronic disease.   Continue rx as prescribed for Tremor  Start Aldactone for known CHF, low dose is fine. Otherwise optimal therapy.     Revisit 4 months    Acute Neurological Problem  The patient's primary symptoms include an altered mental status, focal sensory loss, focal weakness, a loss of balance, memory loss, slurred speech, a visual change and weakness. The patient's pertinent negatives include no clumsiness, near-syncope or syncope. This is a chronic problem. The current episode started more than 1 year ago. The neurological problem developed gradually. The problem has been gradually worsening since onset. There was lower extremity focality noted. Associated symptoms include an auditory change, back pain, chest pain, confusion, fatigue, headaches, neck pain and shortness of breath. Pertinent negatives include no abdominal pain, aura, bladder incontinence, bowel incontinence, diaphoresis, dizziness, fever, light-headedness, nausea, palpitations, vertigo or vomiting. Past treatments include acetaminophen, aspirin, bed rest, medication and walking. The treatment provided mild relief.        Review of Systems   Constitutional:  Positive for fatigue. Negative for diaphoresis and fever.   Respiratory:  Positive for shortness of breath.     Cardiovascular:  Positive for chest pain. Negative for palpitations and near-syncope.   Gastrointestinal:  Negative for abdominal pain, bowel incontinence, nausea and vomiting.   Genitourinary:  Negative for bladder incontinence.   Musculoskeletal:  Positive for back pain and neck pain.   Neurological:  Positive for focal weakness, weakness, headaches and loss of balance. Negative for dizziness, vertigo, syncope and light-headedness.   Psychiatric/Behavioral:  Positive for confusion and memory loss.        Objective   /74   Pulse 85   Temp 36.2 °C (97.2 °F) (Temporal)   Wt 145 kg (320 lb)   BMI 40.00 kg/m²     Physical Exam  Constitutional:       Appearance: Normal appearance.   HENT:      Head: Normocephalic and atraumatic.   Eyes:      Extraocular Movements: Extraocular movements intact.      Pupils: Pupils are equal, round, and reactive to light.   Neck:      Thyroid: No thyroid mass or thyromegaly.   Cardiovascular:      Rate and Rhythm: Normal rate and regular rhythm.      Heart sounds: No murmur heard.     No friction rub. No gallop.   Pulmonary:      Effort: No respiratory distress.      Breath sounds: No wheezing, rhonchi or rales.   Musculoskeletal:      Cervical back: Neck supple.      Right lower leg: No edema.      Left lower leg: No edema.   Neurological:      Mental Status: He is alert.         Assessment/Plan   Assessment & Plan  Chronic systolic heart failure  EF 35-45%  Orders:    Vitamin D 25-Hydroxy,Total (for eval of Vitamin D levels); Future    Comprehensive Metabolic Panel; Future    Hemoglobin A1C; Future    Prostate Specific Antigen, Screen; Future    CBC and Auto Differential; Future    TSH with reflex to Free T4 if abnormal; Future    Albumin-Creatinine Ratio, Urine Random; Future    PTH, intact; Future    spironolactone (Aldactone) 25 mg tablet; Take 1 tablet (25 mg) by mouth once daily.    Benign essential tremor         Hyperparathyroidism (Multi)    Orders:    Vitamin D  25-Hydroxy,Total (for eval of Vitamin D levels); Future    Comprehensive Metabolic Panel; Future    Hemoglobin A1C; Future    Prostate Specific Antigen, Screen; Future    CBC and Auto Differential; Future    TSH with reflex to Free T4 if abnormal; Future    Albumin-Creatinine Ratio, Urine Random; Future    PTH, intact; Future    Hypercalcemia    Orders:    Vitamin D 25-Hydroxy,Total (for eval of Vitamin D levels); Future    Comprehensive Metabolic Panel; Future    Hemoglobin A1C; Future    Prostate Specific Antigen, Screen; Future    CBC and Auto Differential; Future    TSH with reflex to Free T4 if abnormal; Future    Albumin-Creatinine Ratio, Urine Random; Future    PTH, intact; Future    Vitamin D deficiency    Orders:    Vitamin D 25-Hydroxy,Total (for eval of Vitamin D levels); Future    Comprehensive Metabolic Panel; Future    Hemoglobin A1C; Future    Prostate Specific Antigen, Screen; Future    CBC and Auto Differential; Future    TSH with reflex to Free T4 if abnormal; Future    Albumin-Creatinine Ratio, Urine Random; Future    PTH, intact; Future    HTN (hypertension), benign    Orders:    Vitamin D 25-Hydroxy,Total (for eval of Vitamin D levels); Future    Comprehensive Metabolic Panel; Future    Hemoglobin A1C; Future    Prostate Specific Antigen, Screen; Future    CBC and Auto Differential; Future    TSH with reflex to Free T4 if abnormal; Future    Albumin-Creatinine Ratio, Urine Random; Future    PTH, intact; Future    IGT (impaired glucose tolerance)    Orders:    Vitamin D 25-Hydroxy,Total (for eval of Vitamin D levels); Future    Comprehensive Metabolic Panel; Future    Hemoglobin A1C; Future    Prostate Specific Antigen, Screen; Future    CBC and Auto Differential; Future    TSH with reflex to Free T4 if abnormal; Future    Albumin-Creatinine Ratio, Urine Random; Future    PTH, intact; Future

## 2025-01-14 NOTE — ASSESSMENT & PLAN NOTE
EF 35-45%  Orders:    Vitamin D 25-Hydroxy,Total (for eval of Vitamin D levels); Future    Comprehensive Metabolic Panel; Future    Hemoglobin A1C; Future    Prostate Specific Antigen, Screen; Future    CBC and Auto Differential; Future    TSH with reflex to Free T4 if abnormal; Future    Albumin-Creatinine Ratio, Urine Random; Future    PTH, intact; Future    spironolactone (Aldactone) 25 mg tablet; Take 1 tablet (25 mg) by mouth once daily.

## 2025-02-05 ENCOUNTER — OFFICE VISIT (OUTPATIENT)
Dept: CARDIOLOGY | Facility: CLINIC | Age: 65
End: 2025-02-05
Payer: MEDICAID

## 2025-02-05 VITALS
OXYGEN SATURATION: 95 % | HEART RATE: 96 BPM | WEIGHT: 315 LBS | DIASTOLIC BLOOD PRESSURE: 74 MMHG | BODY MASS INDEX: 40.62 KG/M2 | SYSTOLIC BLOOD PRESSURE: 118 MMHG

## 2025-02-05 DIAGNOSIS — I42.9 CARDIOMYOPATHY: Primary | ICD-10-CM

## 2025-02-05 PROCEDURE — 3074F SYST BP LT 130 MM HG: CPT | Performed by: INTERNAL MEDICINE

## 2025-02-05 PROCEDURE — 3078F DIAST BP <80 MM HG: CPT | Performed by: INTERNAL MEDICINE

## 2025-02-05 PROCEDURE — 1036F TOBACCO NON-USER: CPT | Performed by: INTERNAL MEDICINE

## 2025-02-05 PROCEDURE — 99214 OFFICE O/P EST MOD 30 MIN: CPT | Performed by: INTERNAL MEDICINE

## 2025-02-05 ASSESSMENT — PATIENT HEALTH QUESTIONNAIRE - PHQ9
1. LITTLE INTEREST OR PLEASURE IN DOING THINGS: NOT AT ALL
2. FEELING DOWN, DEPRESSED OR HOPELESS: NOT AT ALL
SUM OF ALL RESPONSES TO PHQ9 QUESTIONS 1 AND 2: 0

## 2025-02-05 ASSESSMENT — LIFESTYLE VARIABLES: TOTAL SCORE: 0

## 2025-02-05 ASSESSMENT — ENCOUNTER SYMPTOMS
LOSS OF SENSATION IN FEET: 1
OCCASIONAL FEELINGS OF UNSTEADINESS: 1
DEPRESSION: 1

## 2025-02-05 ASSESSMENT — PAIN SCALES - GENERAL: PAINLEVEL_OUTOF10: 6

## 2025-02-05 NOTE — ASSESSMENT & PLAN NOTE
Doing well from a cardiac standpoint with a nonischemic cardiomyopathy that has been treated successfully with metoprolol and Entresto and spironolactone.  His lab work shows mild chronic kidney disease with creatinine of 1.4 his potassium is 4.8.    I would discontinue his spironolactone to his risk of progressive renal insufficiency and reduce the risk of hyperkalemia in the setting of his Entresto therapy no volume overload and he can in 6 months with BMP at that time

## 2025-02-05 NOTE — PROGRESS NOTES
Subjective      Chief Complaint   Patient presents with    check up        Previous: Based on his previous cardiac catheterization and December 2022 showing mild nonobstructive coronary artery disease, his abnormal nuclear stress test suggests that he may have had an artifact not a true myocardial infarction in his left ventricular apex.       He had a nuclear stress test Lexiscan protocol showed evidence dyskinetic apex with a fixed inferoapical perfusion defect and left ventricular ejection fraction was estimated at 35% which was probably a slight underestimation compared with his echocardiogram.  His echocardiogram showed moderate left ventricular systolic dysfunction with left ventricular ejection fraction 40 to 45%.              Review of Systems   All other systems reviewed and are negative.       Objective   Physical Exam  Constitutional:       Appearance: Normal appearance.   HENT:      Head: Normocephalic and atraumatic.   Eyes:      Pupils: Pupils are equal, round, and reactive to light.   Cardiovascular:      Rate and Rhythm: Normal rate and regular rhythm.      Pulses: Normal pulses.      Heart sounds: Normal heart sounds.   Pulmonary:      Effort: Pulmonary effort is normal.      Breath sounds: Normal breath sounds.   Abdominal:      General: Abdomen is flat. Bowel sounds are normal.      Palpations: Abdomen is soft.   Musculoskeletal:         General: Normal range of motion.      Cervical back: Normal range of motion.   Skin:     General: Skin is warm and dry.   Neurological:      General: No focal deficit present.   Psychiatric:         Mood and Affect: Mood normal.         Judgment: Judgment normal.          Lab Review:   Not applicable    Cardiomyopathy (Multi)  Doing well from a cardiac standpoint with a nonischemic cardiomyopathy that has been treated successfully with metoprolol and Entresto and spironolactone.  His lab work shows mild chronic kidney disease with creatinine of 1.4 his potassium  is 4.8.    I would discontinue his spironolactone to his risk of progressive renal insufficiency and reduce the risk of hyperkalemia in the setting of his Entresto therapy no volume overload and he can in 6 months with BMP at that time    HTN (hypertension), benign  Stable on his present medication.

## 2025-03-04 ENCOUNTER — HOSPITAL ENCOUNTER (INPATIENT)
Facility: HOSPITAL | Age: 65
LOS: 2 days | Discharge: HOME | End: 2025-03-06
Attending: INTERNAL MEDICINE | Admitting: INTERNAL MEDICINE
Payer: MEDICAID

## 2025-03-04 ENCOUNTER — APPOINTMENT (OUTPATIENT)
Dept: RADIOLOGY | Facility: HOSPITAL | Age: 65
End: 2025-03-04
Payer: MEDICAID

## 2025-03-04 ENCOUNTER — APPOINTMENT (OUTPATIENT)
Dept: CARDIOLOGY | Facility: HOSPITAL | Age: 65
End: 2025-03-04
Payer: MEDICAID

## 2025-03-04 DIAGNOSIS — I26.99 PULMONARY EMBOLISM AND INFARCTION (MULTI): Primary | ICD-10-CM

## 2025-03-04 DIAGNOSIS — I26.99 PULMONARY EMBOLISM, BILATERAL (MULTI): ICD-10-CM

## 2025-03-04 DIAGNOSIS — R91.1 LUNG NODULE: ICD-10-CM

## 2025-03-04 LAB
ALBUMIN SERPL BCP-MCNC: 4.2 G/DL (ref 3.4–5)
ALP SERPL-CCNC: 77 U/L (ref 33–136)
ALT SERPL W P-5'-P-CCNC: 21 U/L (ref 10–52)
ANION GAP SERPL CALCULATED.3IONS-SCNC: 10 MMOL/L (ref 10–20)
APTT PPP: 24.8 SECONDS (ref 22–32.5)
AST SERPL W P-5'-P-CCNC: 19 U/L (ref 9–39)
ATRIAL RATE: 100 BPM
BASOPHILS # BLD AUTO: 0.06 X10*3/UL (ref 0–0.1)
BASOPHILS NFR BLD AUTO: 1 %
BILIRUB SERPL-MCNC: 0.3 MG/DL (ref 0–1.2)
BNP SERPL-MCNC: 9 PG/ML (ref 0–99)
BUN SERPL-MCNC: 33 MG/DL (ref 6–23)
CALCIUM SERPL-MCNC: 10.8 MG/DL (ref 8.6–10.3)
CARDIAC TROPONIN I PNL SERPL HS: 9 NG/L (ref 0–20)
CARDIAC TROPONIN I PNL SERPL HS: 9 NG/L (ref 0–20)
CHLORIDE SERPL-SCNC: 108 MMOL/L (ref 98–107)
CO2 SERPL-SCNC: 24 MMOL/L (ref 21–32)
CREAT SERPL-MCNC: 1.38 MG/DL (ref 0.5–1.3)
D DIMER PPP FEU-MCNC: 4.27 MG/L FEU (ref 0.19–0.5)
EGFRCR SERPLBLD CKD-EPI 2021: 57 ML/MIN/1.73M*2
EOSINOPHIL # BLD AUTO: 0.39 X10*3/UL (ref 0–0.7)
EOSINOPHIL NFR BLD AUTO: 6.3 %
ERYTHROCYTE [DISTWIDTH] IN BLOOD BY AUTOMATED COUNT: 14 % (ref 11.5–14.5)
ERYTHROCYTE [DISTWIDTH] IN BLOOD BY AUTOMATED COUNT: 14 % (ref 11.5–14.5)
GLUCOSE SERPL-MCNC: 112 MG/DL (ref 74–99)
HCT VFR BLD AUTO: 45.7 % (ref 41–52)
HCT VFR BLD AUTO: 46.5 % (ref 41–52)
HGB BLD-MCNC: 14.9 G/DL (ref 13.5–17.5)
HGB BLD-MCNC: 15.4 G/DL (ref 13.5–17.5)
IMM GRANULOCYTES # BLD AUTO: 0.01 X10*3/UL (ref 0–0.7)
IMM GRANULOCYTES NFR BLD AUTO: 0.2 % (ref 0–0.9)
LYMPHOCYTES # BLD AUTO: 1.08 X10*3/UL (ref 1.2–4.8)
LYMPHOCYTES NFR BLD AUTO: 17.4 %
MCH RBC QN AUTO: 28.3 PG (ref 26–34)
MCH RBC QN AUTO: 28.7 PG (ref 26–34)
MCHC RBC AUTO-ENTMCNC: 32.6 G/DL (ref 32–36)
MCHC RBC AUTO-ENTMCNC: 33.1 G/DL (ref 32–36)
MCV RBC AUTO: 87 FL (ref 80–100)
MCV RBC AUTO: 87 FL (ref 80–100)
MONOCYTES # BLD AUTO: 0.53 X10*3/UL (ref 0.1–1)
MONOCYTES NFR BLD AUTO: 8.5 %
NEUTROPHILS # BLD AUTO: 4.13 X10*3/UL (ref 1.2–7.7)
NEUTROPHILS NFR BLD AUTO: 66.6 %
NRBC BLD-RTO: 0 /100 WBCS (ref 0–0)
NRBC BLD-RTO: 0 /100 WBCS (ref 0–0)
P AXIS: 7 DEGREES
P OFFSET: 191 MS
P ONSET: 144 MS
PLATELET # BLD AUTO: 249 X10*3/UL (ref 150–450)
PLATELET # BLD AUTO: 262 X10*3/UL (ref 150–450)
POTASSIUM SERPL-SCNC: 4.4 MMOL/L (ref 3.5–5.3)
PR INTERVAL: 158 MS
PROT SERPL-MCNC: 7.1 G/DL (ref 6.4–8.2)
Q ONSET: 223 MS
QRS COUNT: 16 BEATS
QRS DURATION: 94 MS
QT INTERVAL: 316 MS
QTC CALCULATION(BAZETT): 407 MS
QTC FREDERICIA: 374 MS
R AXIS: 40 DEGREES
RBC # BLD AUTO: 5.26 X10*6/UL (ref 4.5–5.9)
RBC # BLD AUTO: 5.37 X10*6/UL (ref 4.5–5.9)
SODIUM SERPL-SCNC: 138 MMOL/L (ref 136–145)
T AXIS: -51 DEGREES
T OFFSET: 381 MS
VENTRICULAR RATE: 100 BPM
WBC # BLD AUTO: 6.2 X10*3/UL (ref 4.4–11.3)
WBC # BLD AUTO: 6.9 X10*3/UL (ref 4.4–11.3)

## 2025-03-04 PROCEDURE — 2500000001 HC RX 250 WO HCPCS SELF ADMINISTERED DRUGS (ALT 637 FOR MEDICARE OP): Performed by: INTERNAL MEDICINE

## 2025-03-04 PROCEDURE — 85730 THROMBOPLASTIN TIME PARTIAL: CPT

## 2025-03-04 PROCEDURE — 36415 COLL VENOUS BLD VENIPUNCTURE: CPT

## 2025-03-04 PROCEDURE — 93971 EXTREMITY STUDY: CPT

## 2025-03-04 PROCEDURE — 2500000004 HC RX 250 GENERAL PHARMACY W/ HCPCS (ALT 636 FOR OP/ED)

## 2025-03-04 PROCEDURE — 93005 ELECTROCARDIOGRAM TRACING: CPT

## 2025-03-04 PROCEDURE — 99285 EMERGENCY DEPT VISIT HI MDM: CPT | Mod: 25

## 2025-03-04 PROCEDURE — 83036 HEMOGLOBIN GLYCOSYLATED A1C: CPT | Mod: WESLAB | Performed by: INTERNAL MEDICINE

## 2025-03-04 PROCEDURE — 2500000002 HC RX 250 W HCPCS SELF ADMINISTERED DRUGS (ALT 637 FOR MEDICARE OP, ALT 636 FOR OP/ED): Performed by: INTERNAL MEDICINE

## 2025-03-04 PROCEDURE — 85025 COMPLETE CBC W/AUTO DIFF WBC: CPT

## 2025-03-04 PROCEDURE — 85027 COMPLETE CBC AUTOMATED: CPT

## 2025-03-04 PROCEDURE — 85300 ANTITHROMBIN III ACTIVITY: CPT

## 2025-03-04 PROCEDURE — 2550000001 HC RX 255 CONTRASTS

## 2025-03-04 PROCEDURE — 2060000001 HC INTERMEDIATE ICU ROOM DAILY

## 2025-03-04 PROCEDURE — 71275 CT ANGIOGRAPHY CHEST: CPT | Performed by: RADIOLOGY

## 2025-03-04 PROCEDURE — 71275 CT ANGIOGRAPHY CHEST: CPT

## 2025-03-04 PROCEDURE — 99291 CRITICAL CARE FIRST HOUR: CPT | Performed by: INTERNAL MEDICINE

## 2025-03-04 PROCEDURE — 93971 EXTREMITY STUDY: CPT | Performed by: RADIOLOGY

## 2025-03-04 PROCEDURE — 80053 COMPREHEN METABOLIC PANEL: CPT

## 2025-03-04 PROCEDURE — 84484 ASSAY OF TROPONIN QUANT: CPT

## 2025-03-04 PROCEDURE — 71045 X-RAY EXAM CHEST 1 VIEW: CPT

## 2025-03-04 PROCEDURE — 83880 ASSAY OF NATRIURETIC PEPTIDE: CPT

## 2025-03-04 PROCEDURE — 71045 X-RAY EXAM CHEST 1 VIEW: CPT | Performed by: RADIOLOGY

## 2025-03-04 RX ORDER — HEPARIN SODIUM 5000 [USP'U]/ML
5000-10000 INJECTION, SOLUTION INTRAVENOUS; SUBCUTANEOUS AS NEEDED
Status: DISCONTINUED | OUTPATIENT
Start: 2025-03-04 | End: 2025-03-06 | Stop reason: HOSPADM

## 2025-03-04 RX ORDER — RISPERIDONE 0.25 MG/1
0.25 TABLET ORAL DAILY
Status: DISCONTINUED | OUTPATIENT
Start: 2025-03-05 | End: 2025-03-06 | Stop reason: HOSPADM

## 2025-03-04 RX ORDER — ONDANSETRON 4 MG/1
4 TABLET, FILM COATED ORAL 4 TIMES DAILY PRN
Status: DISCONTINUED | OUTPATIENT
Start: 2025-03-04 | End: 2025-03-06 | Stop reason: HOSPADM

## 2025-03-04 RX ORDER — DULOXETIN HYDROCHLORIDE 30 MG/1
30 CAPSULE, DELAYED RELEASE ORAL 2 TIMES DAILY
Status: DISCONTINUED | OUTPATIENT
Start: 2025-03-04 | End: 2025-03-06 | Stop reason: HOSPADM

## 2025-03-04 RX ORDER — HEPARIN SODIUM 10000 [USP'U]/100ML
0-4500 INJECTION, SOLUTION INTRAVENOUS CONTINUOUS
Status: DISCONTINUED | OUTPATIENT
Start: 2025-03-04 | End: 2025-03-06

## 2025-03-04 RX ORDER — HEPARIN SODIUM 5000 [USP'U]/ML
10000 INJECTION, SOLUTION INTRAVENOUS; SUBCUTANEOUS ONCE
Status: COMPLETED | OUTPATIENT
Start: 2025-03-04 | End: 2025-03-04

## 2025-03-04 RX ORDER — BUPROPION HYDROCHLORIDE 300 MG/1
300 TABLET ORAL DAILY
Status: DISCONTINUED | OUTPATIENT
Start: 2025-03-05 | End: 2025-03-06 | Stop reason: HOSPADM

## 2025-03-04 RX ORDER — ALUMINUM HYDROXIDE, MAGNESIUM HYDROXIDE, AND SIMETHICONE 1200; 120; 1200 MG/30ML; MG/30ML; MG/30ML
30 SUSPENSION ORAL 4 TIMES DAILY PRN
Status: DISCONTINUED | OUTPATIENT
Start: 2025-03-04 | End: 2025-03-06 | Stop reason: HOSPADM

## 2025-03-04 RX ORDER — ACETAMINOPHEN 500 MG
5 TABLET ORAL NIGHTLY PRN
Status: DISCONTINUED | OUTPATIENT
Start: 2025-03-04 | End: 2025-03-06 | Stop reason: HOSPADM

## 2025-03-04 RX ORDER — ACETAMINOPHEN 325 MG/1
650 TABLET ORAL EVERY 6 HOURS PRN
Status: DISCONTINUED | OUTPATIENT
Start: 2025-03-04 | End: 2025-03-06 | Stop reason: HOSPADM

## 2025-03-04 RX ORDER — ASPIRIN 81 MG/1
81 TABLET ORAL DAILY
Status: DISCONTINUED | OUTPATIENT
Start: 2025-03-05 | End: 2025-03-06 | Stop reason: HOSPADM

## 2025-03-04 RX ORDER — PRIMIDONE 50 MG/1
250 TABLET ORAL NIGHTLY
Status: DISCONTINUED | OUTPATIENT
Start: 2025-03-04 | End: 2025-03-06 | Stop reason: HOSPADM

## 2025-03-04 RX ORDER — METOPROLOL SUCCINATE 25 MG/1
25 TABLET, EXTENDED RELEASE ORAL EVERY EVENING
Status: DISCONTINUED | OUTPATIENT
Start: 2025-03-04 | End: 2025-03-06 | Stop reason: HOSPADM

## 2025-03-04 RX ADMIN — PRIMIDONE 50 MG: 50 TABLET ORAL at 21:05

## 2025-03-04 RX ADMIN — HEPARIN SODIUM 2000 UNITS/HR: 10000 INJECTION, SOLUTION INTRAVENOUS at 16:39

## 2025-03-04 RX ADMIN — IOHEXOL 75 ML: 350 INJECTION, SOLUTION INTRAVENOUS at 13:57

## 2025-03-04 RX ADMIN — DULOXETINE HYDROCHLORIDE 30 MG: 30 CAPSULE, DELAYED RELEASE ORAL at 20:15

## 2025-03-04 RX ADMIN — HEPARIN SODIUM 10000 UNITS: 5000 INJECTION, SOLUTION INTRAVENOUS; SUBCUTANEOUS at 16:40

## 2025-03-04 RX ADMIN — METOPROLOL SUCCINATE 25 MG: 25 TABLET, EXTENDED RELEASE ORAL at 20:15

## 2025-03-04 SDOH — ECONOMIC STABILITY: HOUSING INSECURITY: AT ANY TIME IN THE PAST 12 MONTHS, WERE YOU HOMELESS OR LIVING IN A SHELTER (INCLUDING NOW)?: NO

## 2025-03-04 SDOH — ECONOMIC STABILITY: HOUSING INSECURITY: IN THE PAST 12 MONTHS, HOW MANY TIMES HAVE YOU MOVED WHERE YOU WERE LIVING?: 0

## 2025-03-04 SDOH — SOCIAL STABILITY: SOCIAL INSECURITY: DO YOU FEEL ANYONE HAS EXPLOITED OR TAKEN ADVANTAGE OF YOU FINANCIALLY OR OF YOUR PERSONAL PROPERTY?: NO

## 2025-03-04 SDOH — SOCIAL STABILITY: SOCIAL INSECURITY: WITHIN THE LAST YEAR, HAVE YOU BEEN AFRAID OF YOUR PARTNER OR EX-PARTNER?: NO

## 2025-03-04 SDOH — SOCIAL STABILITY: SOCIAL INSECURITY: HAVE YOU HAD ANY THOUGHTS OF HARMING ANYONE ELSE?: NO

## 2025-03-04 SDOH — SOCIAL STABILITY: SOCIAL INSECURITY
WITHIN THE LAST YEAR, HAVE YOU BEEN RAPED OR FORCED TO HAVE ANY KIND OF SEXUAL ACTIVITY BY YOUR PARTNER OR EX-PARTNER?: NO

## 2025-03-04 SDOH — SOCIAL STABILITY: SOCIAL INSECURITY: DO YOU FEEL UNSAFE GOING BACK TO THE PLACE WHERE YOU ARE LIVING?: NO

## 2025-03-04 SDOH — ECONOMIC STABILITY: INCOME INSECURITY: IN THE PAST 12 MONTHS HAS THE ELECTRIC, GAS, OIL, OR WATER COMPANY THREATENED TO SHUT OFF SERVICES IN YOUR HOME?: NO

## 2025-03-04 SDOH — ECONOMIC STABILITY: FOOD INSECURITY: WITHIN THE PAST 12 MONTHS, YOU WORRIED THAT YOUR FOOD WOULD RUN OUT BEFORE YOU GOT THE MONEY TO BUY MORE.: NEVER TRUE

## 2025-03-04 SDOH — ECONOMIC STABILITY: HOUSING INSECURITY: IN THE LAST 12 MONTHS, WAS THERE A TIME WHEN YOU WERE NOT ABLE TO PAY THE MORTGAGE OR RENT ON TIME?: NO

## 2025-03-04 SDOH — SOCIAL STABILITY: SOCIAL INSECURITY: WITHIN THE LAST YEAR, HAVE YOU BEEN HUMILIATED OR EMOTIONALLY ABUSED IN OTHER WAYS BY YOUR PARTNER OR EX-PARTNER?: NO

## 2025-03-04 SDOH — SOCIAL STABILITY: SOCIAL INSECURITY
WITHIN THE LAST YEAR, HAVE YOU BEEN KICKED, HIT, SLAPPED, OR OTHERWISE PHYSICALLY HURT BY YOUR PARTNER OR EX-PARTNER?: NO

## 2025-03-04 SDOH — SOCIAL STABILITY: SOCIAL INSECURITY: ARE THERE ANY APPARENT SIGNS OF INJURIES/BEHAVIORS THAT COULD BE RELATED TO ABUSE/NEGLECT?: NO

## 2025-03-04 SDOH — ECONOMIC STABILITY: FOOD INSECURITY: WITHIN THE PAST 12 MONTHS, THE FOOD YOU BOUGHT JUST DIDN'T LAST AND YOU DIDN'T HAVE MONEY TO GET MORE.: NEVER TRUE

## 2025-03-04 SDOH — ECONOMIC STABILITY: TRANSPORTATION INSECURITY: IN THE PAST 12 MONTHS, HAS LACK OF TRANSPORTATION KEPT YOU FROM MEDICAL APPOINTMENTS OR FROM GETTING MEDICATIONS?: NO

## 2025-03-04 SDOH — SOCIAL STABILITY: SOCIAL INSECURITY: WERE YOU ABLE TO COMPLETE ALL THE BEHAVIORAL HEALTH SCREENINGS?: YES

## 2025-03-04 SDOH — SOCIAL STABILITY: SOCIAL INSECURITY: ABUSE: ADULT

## 2025-03-04 SDOH — ECONOMIC STABILITY: FOOD INSECURITY: HOW HARD IS IT FOR YOU TO PAY FOR THE VERY BASICS LIKE FOOD, HOUSING, MEDICAL CARE, AND HEATING?: NOT VERY HARD

## 2025-03-04 SDOH — SOCIAL STABILITY: SOCIAL INSECURITY: DOES ANYONE TRY TO KEEP YOU FROM HAVING/CONTACTING OTHER FRIENDS OR DOING THINGS OUTSIDE YOUR HOME?: NO

## 2025-03-04 SDOH — SOCIAL STABILITY: SOCIAL INSECURITY: HAVE YOU HAD THOUGHTS OF HARMING ANYONE ELSE?: NO

## 2025-03-04 SDOH — SOCIAL STABILITY: SOCIAL INSECURITY: ARE YOU OR HAVE YOU BEEN THREATENED OR ABUSED PHYSICALLY, EMOTIONALLY, OR SEXUALLY BY ANYONE?: NO

## 2025-03-04 SDOH — SOCIAL STABILITY: SOCIAL INSECURITY: HAS ANYONE EVER THREATENED TO HURT YOUR FAMILY OR YOUR PETS?: NO

## 2025-03-04 ASSESSMENT — LIFESTYLE VARIABLES
AUDIT-C TOTAL SCORE: 0
EVER FELT BAD OR GUILTY ABOUT YOUR DRINKING: NO
TOTAL SCORE: 0
HAVE YOU EVER FELT YOU SHOULD CUT DOWN ON YOUR DRINKING: NO
HOW MANY STANDARD DRINKS CONTAINING ALCOHOL DO YOU HAVE ON A TYPICAL DAY: PATIENT DOES NOT DRINK
HAVE PEOPLE ANNOYED YOU BY CRITICIZING YOUR DRINKING: NO
HOW OFTEN DO YOU HAVE A DRINK CONTAINING ALCOHOL: NEVER
AUDIT-C TOTAL SCORE: 0
HOW OFTEN DO YOU HAVE 6 OR MORE DRINKS ON ONE OCCASION: NEVER
PRESCIPTION_ABUSE_PAST_12_MONTHS: NO
EVER HAD A DRINK FIRST THING IN THE MORNING TO STEADY YOUR NERVES TO GET RID OF A HANGOVER: NO
SUBSTANCE_ABUSE_PAST_12_MONTHS: NO
SKIP TO QUESTIONS 9-10: 1

## 2025-03-04 ASSESSMENT — COGNITIVE AND FUNCTIONAL STATUS - GENERAL
MOBILITY SCORE: 24
DAILY ACTIVITIY SCORE: 24
PATIENT BASELINE BEDBOUND: NO
MOBILITY SCORE: 24
DAILY ACTIVITIY SCORE: 24
MOBILITY SCORE: 24
DAILY ACTIVITIY SCORE: 24

## 2025-03-04 ASSESSMENT — ENCOUNTER SYMPTOMS
SHORTNESS OF BREATH: 1
GASTROINTESTINAL NEGATIVE: 1
NEUROLOGICAL NEGATIVE: 1
HYPERACTIVE: 1
EYES NEGATIVE: 1
HEMATOLOGIC/LYMPHATIC NEGATIVE: 1
ALLERGIC/IMMUNOLOGIC NEGATIVE: 1
CONSTITUTIONAL NEGATIVE: 1
ENDOCRINE NEGATIVE: 1
BACK PAIN: 1

## 2025-03-04 ASSESSMENT — ACTIVITIES OF DAILY LIVING (ADL)
FEEDING YOURSELF: INDEPENDENT
TOILETING: INDEPENDENT
LACK_OF_TRANSPORTATION: NO
JUDGMENT_ADEQUATE_SAFELY_COMPLETE_DAILY_ACTIVITIES: YES
LACK_OF_TRANSPORTATION: NO
HEARING - RIGHT EAR: FUNCTIONAL
HEARING - LEFT EAR: FUNCTIONAL
GROOMING: INDEPENDENT
ADEQUATE_TO_COMPLETE_ADL: YES
ASSISTIVE_DEVICE: EYEGLASSES
PATIENT'S MEMORY ADEQUATE TO SAFELY COMPLETE DAILY ACTIVITIES?: YES
DRESSING YOURSELF: INDEPENDENT
BATHING: INDEPENDENT
WALKS IN HOME: INDEPENDENT

## 2025-03-04 ASSESSMENT — PATIENT HEALTH QUESTIONNAIRE - PHQ9
1. LITTLE INTEREST OR PLEASURE IN DOING THINGS: NOT AT ALL
SUM OF ALL RESPONSES TO PHQ9 QUESTIONS 1 & 2: 0
2. FEELING DOWN, DEPRESSED OR HOPELESS: NOT AT ALL

## 2025-03-04 ASSESSMENT — COLUMBIA-SUICIDE SEVERITY RATING SCALE - C-SSRS
2. HAVE YOU ACTUALLY HAD ANY THOUGHTS OF KILLING YOURSELF?: NO
1. IN THE PAST MONTH, HAVE YOU WISHED YOU WERE DEAD OR WISHED YOU COULD GO TO SLEEP AND NOT WAKE UP?: NO
6. HAVE YOU EVER DONE ANYTHING, STARTED TO DO ANYTHING, OR PREPARED TO DO ANYTHING TO END YOUR LIFE?: NO

## 2025-03-04 ASSESSMENT — PAIN SCALES - GENERAL
PAINLEVEL_OUTOF10: 5 - MODERATE PAIN
PAINLEVEL_OUTOF10: 0 - NO PAIN
PAINLEVEL_OUTOF10: 0 - NO PAIN

## 2025-03-04 ASSESSMENT — PAIN - FUNCTIONAL ASSESSMENT
PAIN_FUNCTIONAL_ASSESSMENT: 0-10

## 2025-03-04 ASSESSMENT — PAIN DESCRIPTION - PAIN TYPE: TYPE: CHRONIC PAIN

## 2025-03-04 ASSESSMENT — PAIN DESCRIPTION - LOCATION: LOCATION: BACK

## 2025-03-04 NOTE — PROGRESS NOTES
Pharmacy Medication History Review    Brandin Barfield is a 64 y.o. male. Pharmacy reviewed the patient's [id number] medications and allergies for accuracy.    Medications ADDED:  None   Medications CHANGED:  Bupropion 150mg XL - not taking  Hydroxyzine pamoate 25mg - not taking   Atrovent - not taking  Melatonin 3mg - not taking   Medications REMOVED:   None      The list below reflects the updated PTA list. Comments regarding how patient may be taking medications differently can be found in the Admit Orders Activity  Prior to Admission Medications   Prescriptions Last Dose Informant   DULoxetine (Cymbalta) 30 mg DR capsule 3/4/2025 self   Sig: Take 1 capsule (30 mg) by mouth 2 times a day. Do not crush or chew.   alendronate (Fosamax) 10 mg tablet 3/4/2025 self   Sig: Take 1 tablet (10 mg) by mouth once daily.   aspirin 81 mg EC tablet 3/4/2025 self   Sig: Take 1 tablet (81 mg) by mouth once daily.   buPROPion XL (Wellbutrin XL) 300 mg 24 hr tablet 3/4/2025 self   Sig: Take 1 tablet (300 mg) by mouth once daily. Do not crush, chew, or split. Total dose 450mg   cholecalciferol (Vitamin D-3) 5,000 Units tablet 3/4/2025 self   Sig: Take 1 tablet (5,000 Units) by mouth once daily.   empagliflozin (Jardiance) 10 mg 3/4/2025 self   Sig: Take 1 tablet (10 mg) by mouth once daily.   metFORMIN  mg 24 hr tablet 3/3/2025 self   Sig: Take 1 tablet (500 mg) by mouth once daily in the evening. Take with meals. Do not crush, chew, or split.   metoprolol succinate XL (Toprol-XL) 25 mg 24 hr tablet 3/3/2025 self   Sig: Take 1 tablet (25 mg) by mouth once daily in the evening. Do not crush or chew.   paliperidone (Invega) 1.5 mg 24 hr tablet 3/4/2025 self   Sig: Take 1 tablet (1.5 mg) by mouth once daily at bedtime.   primidone (Mysoline) 250 mg tablet 3/3/2025 self   Sig: Take 1 tablet (250 mg) by mouth once daily at bedtime.   sacubitriL-valsartan (Entresto) 24-26 mg tablet 3/4/2025 self   Sig: Take 1 tablet by mouth  2 times a day.      Facility-Administered Medications: None        The list below reflects the updated allergy list. Please review each documented allergy for additional clarification and justification.  Allergies  Reviewed by Tiana Badillo CPhT on 3/4/2025        Severity Reactions Comments    Vibramycin [doxycycline Calcium] High Swelling     Doxycycline Not Specified Unknown             Pharmacy has been updated to [facility].    Sources used to complete the med history include dispense history, PTA medication list, patient interview. Patient is a poor historian.    Below are additional concerns with the patient's PTA list.  Patient relies heavily on his pill pack from [facility] pharmacy for dosing. Recommend patient carry a list. Patient had a hard time recalling day doses and evening doses.    Tiana Badillo CPhT-Adv  Please reach out via [employer] Secure Chat for questions

## 2025-03-04 NOTE — H&P
History Of Present Illness  Brandin Barfield is a 64 y.o. male presenting with acute onset of shortness of breath.  Patient denies previous history of venous thromboembolism.  He denies history of DVT or PE in the family.  He is not on any chronic anticoagulation.  Patient has past medical history of CHF, echocardiogram performed 1 year ago demonstrated ejection fraction of 35 to 40%.  Patient takes Entresto at home.  He follows with Dr. Boss.  Patient had cardiac catheterization 2023 and he did not show obstructive disease.  Patient admits to chronic shortness of breath going on over the last few months his.  He also admits to chronic right lower extremity edema.  Today, he was in the shower when he had an acute onset of severe shortness of breath.  He denies chest pain or dizziness.  Denies syncopal near syncopal episodes.  Patient presented to the emergency department where he was diagnosed with multifocal bilateral pulmonary embolism.  Also CAT scan demonstrated solitary pulmonary nodule in the right upper lobe increased in size since 2023.  Patient is stable hemodynamically, he does not require supplementary oxygen.  His case was discussed with [facility] team and he was not considered to be a candidate for thrombolysis therapy.  Patient was started on heparin drip.  During exam, patient denies any acute problems.  Past Medical History  He has a past medical history of ADHD (attention deficit hyperactivity disorder) (1966), Allergic, Anxiety (1990), Arrhythmia, Arthritis (2020), CHF (congestive heart failure) (10/22), Depression (1990), Eczema (During sánchez), Fracture of lumbar spine (Multi), H/O supraventricular tachycardia, Headache (1980), Hyperlipidemia, and Hypertension.  Chronic back pain  Surgical History  He has a past surgical history that includes Other surgical history (11/07/2022); MR angio head wo IV contrast (06/21/2017); MR angio neck wo IV contrast (06/21/2017); Cardiac catheterization (03/23);  and Cardiac electrophysiology study and ablation.     Social History  He reports that he quit smoking about 10 months ago. His smoking use included cigarettes. He started smoking about 56 years ago. He has a 284.4 pack-year smoking history. He has never used smokeless tobacco. He reports that he does not currently use alcohol. He reports that he does not use drugs.    Family History  Family History   Problem Relation Name Age of Onset    Coronary artery disease Father Fernando Barfield     Heart disease Father Fernando Barfield     Coronary artery disease Brother Fidel Barfield     Drug abuse Brother Fidel Barfield     Depression Mother Betsy Barfield     Hypertension Mother Betsy Barfield     Mental illness Mother Betsy Barfield         Allergies  Vibramycin [doxycycline calcium] and Doxycycline    Review of Systems   Constitutional: Negative.    HENT: Negative.     Eyes: Negative.    Respiratory:  Positive for shortness of breath.    Cardiovascular:  Positive for leg swelling.   Gastrointestinal: Negative.    Endocrine: Negative.    Genitourinary: Negative.    Musculoskeletal:  Positive for back pain.   Skin: Negative.    Allergic/Immunologic: Negative.    Neurological: Negative.    Hematological: Negative.    Psychiatric/Behavioral:  The patient is hyperactive.      Patient has chronic back pain, he takes ibuprofen 1224 hours as needed for pain control, last time he took ibuprofen was 2 days ago.  Physical Exam  Occasion: Emergency department, room 6.  Pi is in no apparent distress.   Cooperative with exam.  Nontoxic-appearing.  Comfortable at rest on room air at rest.  Skin is clean, dry.  No skin lesions, rashes, ecchymosis.  Head is atraumatic, normocephalic.  Mouth mucosa is pink and moist.  No mucosal lesions.  No nasal discharge.  Musculoskeletal: No deformities, no muscle swelling.  No point tenderness to palpation.  Neck is supple, no JVD, no carotid bruits.  No  lymphadenopathy.  Chest is atraumatic.  No tenderness to palpation.  Lungs are clear to auscultation bilaterally.  Diminished at bases bilaterally no wheezes, no rales, no rhonchi.  Heart: Regular rate and rhythm S1-S2.  No murmurs, rubs, gallops.  Peripheral pulses are palpable in all extremities, equal.  Enter demonstrates sinus rhythm  Abdomen is soft, not tender, BS, not distended.  Bowel sounds positive in all quadrants.  No rebound, no guarding.  Rectal exam deferred.  Extremities: No cords, cyanosis, varices.  Lower extremity: Chronic edema.  Neuro: Patient is alert, oriented x3.  Moves all extremities.  No gross focal neurological deficits.  Face is symmetrical.  Tongue is midline.  No visual abnormalities.  No dysarthria or aphasia.  Psychiatric: Patient is cooperative with exam, maintains good eye contact.  No evidence of psychosis, suicidal ideation or depression.   Last Recorded Vitals  BP (!) 128/99 (BP Location: Left arm, Patient Position: Sitting)   Pulse 84   Temp 37.1 °C (98.8 °F)   Resp 14   Wt 147 kg (323 lb)   SpO2 96%     Relevant Results        CT angio chest for pulmonary embolism    Result Date: 3/4/2025  Interpreted By:  Sandeep Kidd, STUDY: CT ANGIO CHEST FOR PULMONARY EMBOLISM;  3/4/2025 2:07 pm   INDICATION: 63 y/o   M with  Signs/Symptoms:Shortness of breath, elevated D-dimer, assess for PE.     LIMITATIONS: None.   ACCESSION NUMBER(S): [id number]   ORDERING CLINICIAN: PREET STANTON   TECHNIQUE: After the administration of intravenous nonionic contrast, thin-section arterial phase images were obtained  from the thoracic inlet down through the iliac crest. Sagittal and coronal reconstruction images were generated. Axial and coronal MIP vascular reconstruction images were also generated.   Mediastinal, lung, bone, and liver windows were reviewed. Omnipaque 350   75 ML     COMPARISON: Comparison study is from 11/06/2023. Correlation with chest x-ray from earlier this afternoon.    FINDINGS: CHEST WALL/BASE OF THE NECK: The chest wall was grossly intact. No thyromegaly or thyroid mass.   MEDIASTINUM/OSORIO: Mild nonspecific right hilar adenopathy with lymph nodes measuring up to 22 mm in short axis. There is very mild left hilar adenopathy with lymph nodes measuring up to 9 mm short axis. There is subcarinal adenopathy measuring 16 mm short axis. There is mild central mediastinal adenopathy with lymph nodes measuring up to 14 mm short axis. Scant small fat centered lymph nodes in each axilla, most likely reactive. No cardiomegaly. No pericardial effusion. No thoracic aortic aneurysm or dissection. For technical reasons or perhaps related to patient body habitus, there is suboptimal opacification of the pulmonary arteries. Despite this, there are segmental proximal left lower lobe pulmonary artery filling defects consistent with pulmonary emboli, and there are pulmonary emboli in the proximal right middle lobe pulmonary artery, in the proximal right lower lobe pulmonary artery, and in several right lower lobe segmental pulmonary arteries. There is also a segmental posterior right upper lobe pulmonary artery filling defect consistent with embolism. No CT evidence of right heart strain.   LUNGS/ PLEURA/ AND TRACHEA: On the prior exam, there was a right-sided perihilar pneumonia, with a small adjacent nodule lateral to this in the right upper lobe that previously measured 11 x 10 mm. Today, there is a more smoothly marginated but still lobulated nodule in the right lower lobe in this area that measures 14 x 11 mm, larger than on the previous exam. There is no other abnormal density in either lung. No  pleural effusion or pneumothorax. The trachea was grossly intact.   BONES: No destructive lytic or blastic bone lesion. Stable superior endplate compression fracture with anterior wedging at L1. Mild stable superior endplate compression fracture with anterior wedging at T4. No acute thoracic spine  compression fracture based on this exam. The sternum is intact.   UPPER ABDOMEN: There is a posteromedial midpole right renal cyst measuring 25 mm in diameter. There is a posterior mid to lower pole left renal cyst measuring 33 mm and a central midpole left renal cyst measuring 42 mm. There is mild-to-moderate stool throughout the imaged upper colon. The remainder of the imaged upper abdomen was grossly intact.       Multifocal bilateral pulmonary embolism as described. No CT evidence of right heart strain at this time.   Mild nonspecific bilateral hilar adenopathy, right greater than left, and also mediastinal adenopathy.   Solitary pulmonary nodule: Smoothly marginated but lobulated noncalcified central right upper lobe lung nodule measuring 14 x 11 mm today, compared to 11 x 10 mm on 11/06/2023. This could be a slowly enlarging neoplasm. Recommend PET-CT scan in follow-up.   Mild nonspecific but grossly stable mediastinal and bilateral hilar (right greater than left) adenopathy, perhaps reactive. This could also be further evaluated with PET-CT scan.   Bilateral renal cysts.   Stable compression fractures with anterior wedging at L1 and at T4.   MACRO: None   Signed by: Sandeep Kidd 3/4/2025 2:38 PM Dictation workstation:   [id number]    Lower extremity venous duplex right    Result Date: 3/4/2025  Interpreted By:  Joseph Celeste, STUDY: [facility] LOWER EXTREMITY VENOUS DUPLEX RIGHT  3/4/2025 1:17 pm   INDICATION: 65 y/o   M with  Signs/Symptoms:Right leg swelling. LMP:  Unknown.       COMPARISON: None.   ACCESSION NUMBER(S): [id number]   ORDERING CLINICIAN: PREET STANTON   TECHNIQUE: Routine ultrasound of the  right lower extremity was performed with duplex Doppler (color and spectral) evaluation.   Static images were obtained for remote interpretation.   FINDINGS: THIGH VEINS:  The right common femoral, femoral, popliteal, proximal medial saphenous, and deep femoral veins are patent and free of thrombus.   The veins are normally compressible.  They demonstrate normal phasic flow and augmentation response.         Negative study.  No deep venous thrombosis of the  right lower extremity.   MACRO: None   Signed by: Joseph Celeste 3/4/2025 1:22 PM Dictation workstation:   [id number]    ECG 12 lead    Result Date: 3/4/2025  Normal sinus rhythm Nonspecific ST and T wave abnormality Abnormal ECG When compared with ECG of 06-NOV-2023 08:50, ST more depressed in Inferior leads ST no longer depressed in Lateral leads T wave inversion now evident in Inferior leads Inverted T waves have replaced nonspecific T wave abnormality in Lateral leads Confirmed by Benjamin Ureña ([id number]) on 3/4/2025 12:41:41 PM    XR chest 1 view    Result Date: 3/4/2025  Interpreted By:  Andrea Hartmann, STUDY: XR CHEST 1 VIEW   INDICATION: Signs/Symptoms:Shortness of breath.   COMPARISON: CT of the chest performed November 6, 2023   ACCESSION NUMBER(S): [id number]   ORDERING CLINICIAN: PREET STANTON   FINDINGS: No consolidation, effusion, edema, or pneumothorax. Heart size upper limits of normal.       No evidence of acute intrathoracic abnormality.   Signed by: Andrea Hartmann 3/4/2025 12:05 PM Dictation workstation:   [id number]      Results for orders placed or performed during the hospital encounter of 03/04/25 (from the past 24 hours)   ECG 12 lead   Result Value Ref Range    Ventricular Rate 100 BPM    Atrial Rate 100 BPM    IL Interval 158 ms    QRS Duration 94 ms    QT Interval 316 ms    QTC Calculation(Bazett) 407 ms    P Axis 7 degrees    R Axis 40 degrees    T Axis -51 degrees    QRS Count 16 beats    Q Onset 223 ms    P Onset 144 ms    P Offset 191 ms    T Offset 381 ms    QTC Fredericia 374 ms   CBC and Auto Differential   Result Value Ref Range    WBC 6.2 4.4 - 11.3 x10*3/uL    nRBC 0.0 0.0 - 0.0 /100 WBCs    RBC 5.37 4.50 - 5.90 x10*6/uL    Hemoglobin 15.4 13.5 - 17.5 g/dL    Hematocrit 46.5 41.0 - 52.0 %    MCV 87 80 - 100 fL    MCH 28.7  26.0 - 34.0 pg    MCHC 33.1 32.0 - 36.0 g/dL    RDW 14.0 11.5 - 14.5 %    Platelets 249 150 - 450 x10*3/uL    Neutrophils % 66.6 40.0 - 80.0 %    Immature Granulocytes %, Automated 0.2 0.0 - 0.9 %    Lymphocytes % 17.4 13.0 - 44.0 %    Monocytes % 8.5 2.0 - 10.0 %    Eosinophils % 6.3 0.0 - 6.0 %    Basophils % 1.0 0.0 - 2.0 %    Neutrophils Absolute 4.13 1.20 - 7.70 x10*3/uL    Immature Granulocytes Absolute, Automated 0.01 0.00 - 0.70 x10*3/uL    Lymphocytes Absolute 1.08 (L) 1.20 - 4.80 x10*3/uL    Monocytes Absolute 0.53 0.10 - 1.00 x10*3/uL    Eosinophils Absolute 0.39 0.00 - 0.70 x10*3/uL    Basophils Absolute 0.06 0.00 - 0.10 x10*3/uL   Comprehensive metabolic panel   Result Value Ref Range    Glucose 112 (H) 74 - 99 mg/dL    Sodium 138 136 - 145 mmol/L    Potassium 4.4 3.5 - 5.3 mmol/L    Chloride 108 (H) 98 - 107 mmol/L    Bicarbonate 24 21 - 32 mmol/L    Anion Gap 10 10 - 20 mmol/L    Urea Nitrogen 33 (H) 6 - 23 mg/dL    Creatinine 1.38 (H) 0.50 - 1.30 mg/dL    eGFR 57 (L) >60 mL/min/1.73m*2    Calcium 10.8 (H) 8.6 - 10.3 mg/dL    Albumin 4.2 3.4 - 5.0 g/dL    Alkaline Phosphatase 77 33 - 136 U/L    Total Protein 7.1 6.4 - 8.2 g/dL    AST 19 9 - 39 U/L    Bilirubin, Total 0.3 0.0 - 1.2 mg/dL    ALT 21 10 - 52 U/L   B-Type Natriuretic Peptide   Result Value Ref Range    BNP 9 0 - 99 pg/mL   D-dimer, quantitative   Result Value Ref Range    D-Dimer Non VTE, Quant (mg/L FEU) 4.27 (H) 0.19 - 0.50 mg/L FEU   Troponin I, High Sensitivity, Initial   Result Value Ref Range    Troponin I, High Sensitivity 9 0 - 20 ng/L   Troponin, High Sensitivity, 1 Hour   Result Value Ref Range    Troponin I, High Sensitivity 9 0 - 20 ng/L   aPTT   Result Value Ref Range    aPTT 24.8 22.0 - 32.5 seconds   CBC   Result Value Ref Range    WBC 6.9 4.4 - 11.3 x10*3/uL    nRBC 0.0 0.0 - 0.0 /100 WBCs    RBC 5.26 4.50 - 5.90 x10*6/uL    Hemoglobin 14.9 13.5 - 17.5 g/dL    Hematocrit 45.7 41.0 - 52.0 %    MCV 87 80 - 100 fL    MCH 28.3  26.0 - 34.0 pg    MCHC 32.6 32.0 - 36.0 g/dL    RDW 14.0 11.5 - 14.5 %    Platelets 262 150 - 450 x10*3/uL         Assessment/Plan   Assessment & Plan  Pulmonary embolism and infarction (Multi)      Bilateral mild multiple pulmonary emboli.  Unprovoked.  No history of DVT PE in the past.  Patient denies any recent surgeries or travels.  He has sedentary lifestyle.  He has lung nodules suspicious for malignancy given history of smoking.  At this point, continue heparin drip and consult pulmonology.  Will likely discharge home on Eliquis.  Patient will need workup to rule out malignancy including CAT scan with IV contrast of the abdomen and pelvis.  Will not order yet given abnormal renal function and IV contrast exposure today.  Acute renal failure.  Patient had normal renal function in the past.  Will hold Entresto.  Patient was instructed to stop ibuprofen.  Will monitor renal function he will benefit from follow-up with nephrologist which may be done as outpatient and renal function is not getting worse.  Hypertension.  Continue metoprolol  Obesity  History of smoking, quit more than 10 years ago  CODE STATUS, discussed with patient: Full code    Critical care time spent with patient today 42 minutes.       Syl Hobbs MD

## 2025-03-04 NOTE — CARE PLAN
The patient's goals for the shift include  feel better    The clinical goals for the shift include  heparin gtt     Over the shift, the patient did not make progress toward the following goals. Barriers to progression include none. Recommendations to address these barriers include none.

## 2025-03-04 NOTE — CONSULTS
PULMONARY EMBOLISM RESPONSE TEAM (PERT) NOTE    This note represents a summary of a virtual evaluation by the pulmonary embolism response team requested by  ***.  Suggestions and impression are summarized from the initial virtual encounter and discussion with the referring medical team. These suggestions supplement but should not be a substitute for bedside evaluation and management by the attending of record.     PERT Members on the Call:  Critical Care Attending:  ***  [facility] Interventional Cardiology Attending:  ***  Vascular Medicine Attending:  ***  CICU Attending:   ***  Critical Care Fellow:  ***  CICU Fellow:  ***  Cardiology Interventional Fellow:  ***    Brief Clinical Summary:  Brandin Barfield is a 64 y.o. male presenting to PERT conference for evaluation of Class***Risk, *** pulmonary embolism.    In brief, as communicated by requesting provider, patient presented to [facility] ER with acute dyspnea exertion, rle swelling, palpitations, denies chest pain, syncope.     In regards to co-morbidities, communicated by requesting provider, [patient has history of  [***bold]: Heart failure: HFrEF 2/2 , Cancer***, Chronic Lung Disease***, Clotting disorder***, History of VTE:***. Otherwise, reportedly, denies history of***.       On exam, Vitals:***HR***, BP***RR***Oxygen saturation:***Temperature:***.  Narrow pulse pressure; otherwise, 120s/90s    HR 80s-100s, sinus   No tachypnea   No respiratory failure     Biomarkers: Troponin***, BNP***, Lactate***.   BNP 9   Troponin 9 to 9     With respect to imaging, CT Chest Pulmonary Embolism demonstrated***. RV Strain***. RV/LV ratio:***.     Segmental proximal left lower lobe pulmonary artery pulmonary emboli, and there are  pulmonary emboli in the proximal right middle lobe pulmonary artery,  in the proximal right lower lobe pulmonary artery, and in several  right lower lobe segmental pulmonary arteries. There is also a  segmental posterior  "right upper lobe pulmonary artery filling defect  consistent with embolism. No CT evidence of right heart strain.    Notably,   Mild nonspecific bilateral hilar adenopathy, right greater than left,  and also mediastinal adenopathy.      Solitary pulmonary nodule with interval growth from 2023: Smoothly marginated but lobulated  noncalcified central right upper lobe lung nodule measuring 14 x 11  mm today, compared to 11 x 10 mm on 11/06/2023. This could be a  slowly enlarging neoplasm. Recommend PET-CT scan in follow-up.    Venous duplex ultrasound of lower extremities:   Right lower extremity: negative .   Limited surface echocardiogram***.   Formal surface echocardiogram***.     PESI Severity Score***.   Current intervention includes***.     As communicated by requesting provider, there appears to be [bold below]  ***history of stroke of ***unknown origin or hemorrhagic stroke   ***history of ischemic stroke within past 6 months  ***major trauma,surgery, or head injury within previous 3 weeks   ***active hemorrhage or bleeding diathesis   Or [relative]   ***TIA in previous 6 months   ***refractory HTN SBP > 180   ***infective endocarditis   ***Oral anticoagulation  ***Active peptic ulcer disease  ***Advanced liver disease   ***Traumatic resuscitation   ***Non-compressible puncture sites  ***Pregnancy or first post-partum week     Vital Signs  BP (!) 128/99 (BP Location: Left arm, Patient Position: Sitting)   Pulse (!) 103   Temp 37.1 °C (98.8 °F)   Resp 18   Ht 1.905 m (6' 3\")   Wt 147 kg (323 lb)   SpO2 98%   BMI 40.37 kg/m²      Laboratory  Recent Labs     03/04/25  1343 03/04/25  1217 02/07/24  0922 11/29/22  1146   TROPHS 9 9  --   --    BNP  --  9 14 6       PESI Score:  ***, consistent with {JLPESALBERSK:22904:::1} PE.    CT Scan reviewed:  Clot burden ***, present in ***  RV/LV ratio *** by CT scan    TTE reviewed (if available):  RV size and function:  RV/LV ratio *** by TTE (if " applicable):  Intracardiac thrombus visualized:  Patent foramen ovale:    Venous duplex (if available):    Additional findings: ***    Contraindications to anticoagulation:  Contraindications to thrombolytics:    Initial Impressions:  ERS/ESC Pulmonary Embolism Risk Category: {JLPECLASS:09782:::1}.  ***    Initial Suggestions/Plans:  Admit to *** unit at *** facility under ***.  Initial therapy suggested: ***.  Additional testing recommended: ***  Consults suggested: ***.  Additional suggestions for re-evaluation/reconference or retesting: ***.    To re conference the PERT team please call the  Transfer Center at [phone].

## 2025-03-04 NOTE — PROCEDURES
"PULMONARY EMBOLISM RESPONSE TEAM (PERT) NOTE    This note represents a summary of a virtual evaluation by the pulmonary embolism response team requested by Bozena Armstrong PA-C  Suggestions and impression are summarized from the initial virtual encounter and discussion with the referring medical team. These suggestions supplement but should not be a substitute for bedside evaluation and management by the attending of record.     PERT Members on the Call:  PERT activation deferred  Staffed with MICU attending     Brief Clinical Summary:  Brandin Barfield is a 64 y.o. male presenting to PERT conference for evaluation of pulmonary embolism [low-risk PE]    In brief, as communicated by requesting provider, patient presented to [facility] ED with acute dyspnea, right lower extremity swelling, palpitations. Denies chest pain, pre-syncope, syncope.      In regards to co-morbidities, communicated by requesting provider, patient has history of Heart failure, Cancer. Otherwise, reportedly, denies history of Chronic Lung Disease, Clotting disorder, History of VTE.    Vital Signs  BP (!) 128/99 (BP Location: Left arm, Patient Position: Sitting)   Pulse 84   Temp 37.1 °C (98.8 °F)   Resp 14   Ht 1.905 m (6' 3\")   Wt 147 kg (323 lb)   SpO2 96%   BMI 40.37 kg/m²      Laboratory  Recent Labs     03/04/25  1343 03/04/25  1217 02/07/24  0922 11/29/22  1146   TROPHS 9 9  --   --    BNP  --  9 14 6       PESI Score:  JLPESIRISK: Class II, or Low risk (1.7-3.5% 30-day mortality risk) PE.    CT Scan reviewed:  03/04/2025: Multifocal bilateral pulmonary embolism as described. No CT evidence  of right heart strain at this time.    TTE reviewed (if available):  Not obtained     Venous duplex (if available):  03/04/2025: Negative study. No deep venous thrombosis of the right lower extremity.        Contraindications to anticoagulation: does not appear   Contraindications to thrombolytics: does not appear     Initial Impressions:  ERS/ESC " Pulmonary Embolism Risk Category: JLPECLASS: Low risk.    Initial Suggestions/Plans:  Initiate systemic therapeutic heparin   Mechanical reperfusion not indicated   Please pursue completing comprehensive evaluation including lower extremity duplex US, formal surface echocardiogram, lactate   Does not warrant escalation to intensive care unit or transfer to [facility]   If clinical deterioration as evidenced for concern for circulatory failure and/or respiratory failure and plausibly attributed to pulmonary embolism, can re-engage PERT     To re conference the PERT team please call the  Transfer Center at [phone].

## 2025-03-04 NOTE — ED PROVIDER NOTES
"HPI   Chief Complaint   Patient presents with    Shortness of Breath     Pt states he has had increased sob. Pt denies any recent cough or congestion. Pt states he has increased swelling to right leg.       HPI  Patient is a 64-year-old male with history of hypertension, hyperlipidemia, congestive heart failure, anxiety presenting to ER for evaluation of shortness of breath that started abruptly this morning.  Patient states that while he was in his shower he had difficulty taking a deep breath in and catching his breath.  States he had to get out of the shower and sit down and then go back and to catch his breath.  He states he did take his pulse on his watch and it was in the 160s.  He also endorses increased swelling of his right lower extremity.  Patient states that he has had swelling in his right lower extremity that is worsened over the past couple years.  He denies any leg pain.  Denies any recent cough congestion or purulent sputum production.  States that he did have a \"heart problem\" about a year and a half back with similar symptoms where he had to have an ablation performed.  He states he does not take blood thinning medication.  History of DVT or PE.  No recent surgery.      Patient History   Past Medical History:   Diagnosis Date    ADHD (attention deficit hyperactivity disorder) 1966    Allergic     Anxiety 1990    Arrhythmia     Arthritis 2020    CHF (congestive heart failure) 10/22    Depression 1990    Eczema During winters    Fracture of lumbar spine (Multi)     H/O supraventricular tachycardia     Headache 1980    Hyperlipidemia     Hypertension      Past Surgical History:   Procedure Laterality Date    CARDIAC CATHETERIZATION  03/23    CARDIAC ELECTROPHYSIOLOGY STUDY AND ABLATION      MR HEAD ANGIO WO IV CONTRAST  06/21/2017    MR HEAD ANGIO WO IV CONTRAST 6/21/2017 [facility]GENCY [facility]GAC[facility]    MR NECK ANGIO WO IV CONTRAST  06/21/2017    MR NECK ANGIO WO IV CONTRAST 6/21/2017 [facility] EMERGENCY LEGACY    " OTHER SURGICAL HISTORY  2022    No history of surgery     Family History   Problem Relation Name Age of Onset    Coronary artery disease Father Fernando Urbanoct     Heart disease Father Fernando Barfield     Coronary artery disease Brother Fidel Barfield     Drug abuse Brother Fidel Barfield     Depression Mother Betsy Barfield     Hypertension Mother Betsy Barfield     Mental illness Mother Betsy Barfield      Social History     Tobacco Use    Smoking status: Former     Current packs/day: 0.00     Average packs/day: 3.0 packs/day for 94.8 years (284.4 ttl pk-yrs)     Types: Cigarettes     Start date: 1968     Quit date: 2024     Years since quittin.8    Smokeless tobacco: Never   Vaping Use    Vaping status: Never Used   Substance Use Topics    Alcohol use: Not Currently     Comment: Quit in     Drug use: Never       Physical Exam   ED Triage Vitals [25 1134]   Temperature Heart Rate Respirations BP   37.1 °C (98.8 °F) (!) 103 18 (!) 128/99      Pulse Ox Temp src Heart Rate Source Patient Position   98 % -- -- Sitting      BP Location FiO2 (%)     Left arm --       Physical Exam  Vitals and nursing note reviewed.   Constitutional:       General: He is not in acute distress.     Appearance: He is well-developed.      Comments: Resting in exam chair in no apparent distress   HENT:      Head: Normocephalic and atraumatic.   Eyes:      Conjunctiva/sclera: Conjunctivae normal.   Cardiovascular:      Rate and Rhythm: Regular rhythm. Tachycardia present.      Heart sounds: No murmur heard.  Pulmonary:      Effort: Pulmonary effort is normal. No respiratory distress.      Breath sounds: Normal breath sounds.   Abdominal:      Palpations: Abdomen is soft.      Tenderness: There is no abdominal tenderness.   Musculoskeletal:         General: No swelling.      Cervical back: Neck supple.      Comments: There is mild edema that is nonpitting of the right lower  extremity compared to the left though no erythema or warmth   Skin:     General: Skin is warm and dry.      Capillary Refill: Capillary refill takes less than 2 seconds.   Neurological:      Mental Status: He is alert.   Psychiatric:         Mood and Affect: Mood normal.           ED Course & MDM   ED Course as of 03/04/25 1628   Tue Mar 04, 2025   1130 EKG on my independent interpretation: Normal sinus rhythm 100 bpm, normal axis, normal intervals, T wave inversion with ST depression in 2, 3, aVF, V6 significantly more prominent compared to prior EKG 11/6/2023 [ISIDRO]   1608 I did speak with the PERT team regarding the patient case.  They recommend IV heparin drip do not recommend any acute interventions otherwise at this time as patient is hemodynamically stable and well-appearing. [JJ]      ED Course User Index  [JJ] Bozena Engle PA-C  [ISIDRO] Gail Gongora MD         Diagnoses as of 03/04/25 1628   Pulmonary embolism, bilateral (Multi)   Pulmonary embolism and infarction (Multi)                 No data recorded     [place] Coma Scale Score: 15 (03/04/25 1132 : Hayley Magallanes RN)                           Medical Decision Making  Parts of this chart have been completed using voice recognition software. Please excuse any errors of transcription.  My thought process and reason for plan has been formulated from the time that I saw the patient until the time of disposition and is not specific to one specific moment during their visit and furthermore my MDM encompasses this entire chart and not only this text box.      HPI: Detailed above.    Exam: A medically appropriate exam performed, outlined above, given the known history and presentation.    History obtained from: Patient    EKG: Interpreted by attending physician and reviewed by me    Social Determinants of Health considered during this visit: Lives independently    Medications given during visit:  Medications   heparin (porcine) injection 10,000 Units (has no  administration in time range)   heparin 25,000 Units in dextrose 5% 250 mL (100 Units/mL) infusion (premix) (has no administration in time range)   heparin (porcine) injection 5,000-10,000 Units (has no administration in time range)   iohexol (OMNIPaque) 350 mg iodine/mL solution 75 mL (75 mL intravenous Given 3/4/25 5897)        Diagnostic/tests  Labs Reviewed   CBC WITH AUTO DIFFERENTIAL - Abnormal       Result Value    WBC 6.2      nRBC 0.0      RBC 5.37      Hemoglobin 15.4      Hematocrit 46.5      MCV 87      MCH 28.7      MCHC 33.1      RDW 14.0      Platelets 249      Neutrophils % 66.6      Immature Granulocytes %, Automated 0.2      Lymphocytes % 17.4      Monocytes % 8.5      Eosinophils % 6.3      Basophils % 1.0      Neutrophils Absolute 4.13      Immature Granulocytes Absolute, Automated 0.01      Lymphocytes Absolute 1.08 (*)     Monocytes Absolute 0.53      Eosinophils Absolute 0.39      Basophils Absolute 0.06     COMPREHENSIVE METABOLIC PANEL - Abnormal    Glucose 112 (*)     Sodium 138      Potassium 4.4      Chloride 108 (*)     Bicarbonate 24      Anion Gap 10      Urea Nitrogen 33 (*)     Creatinine 1.38 (*)     eGFR 57 (*)     Calcium 10.8 (*)     Albumin 4.2      Alkaline Phosphatase 77      Total Protein 7.1      AST 19      Bilirubin, Total 0.3      ALT 21     D-DIMER, NON VTE - Abnormal    D-Dimer Non VTE, Quant (mg/L FEU) 4.27 (*)     Narrative:     THROMBOEMBOLIC EVENTS CANNOT BE EXCLUDED SOLELY ON THE BASIS OF THE D-DIMER LEVEL BEING WITHIN THE NORMAL REFERENCE RANGE. D-DIMER LEVELS LESS THAN 0.5 MG/L FEU IN CONJUNCTION WITH A LOW CLINICAL PROBABILITY HAVE AN EXCELLENT NEGATIVE PREDICTIVE VALUE IN EXCLUDING A DIAGNOSIS OF PULMONARY EMBOLUS (PE) OR DEEP VEIN THROMBOSIS (DVT). ELEVATED D-DIMER LEVELS ARE NOT SPECIFIC TO PE OR DVT, AND MAY BE SEEN IN PATIENTS WITH DIC, ADVANCED AGE, PREGNANCY, MALIGNANCY, LIVER DISEASE, INFECTION, AND INFLAMMATORY CONDITIONS AMONG OTHERS. D-DIMER LEVELS  MAY BE DECREASED IN PATIENTS RECEIVING ANTI-COAGULATION THERAPY. Sample integrity checked.   B-TYPE NATRIURETIC PEPTIDE - Normal    BNP 9      Narrative:        <100 pg/mL - Heart failure unlikely  100-299 pg/mL - Intermediate probability of acute heart                  failure exacerbation. Correlate with clinical                  context and patient history.    >=300 pg/mL - Heart Failure likely. Correlate with clinical                  context and patient history.    BNP testing is performed using different testing methodology at [facility] than at other [facility] hospitals. Direct result comparisons should only be made within the same method.      SERIAL TROPONIN-INITIAL - Normal    Troponin I, High Sensitivity 9      Narrative:     Less than 99th percentile of normal range cutoff-  Female and children under 18 years old <14 ng/L; Male <21 ng/L: Negative  Repeat testing should be performed if clinically indicated.     Female and children under 18 years old 14-50 ng/L; Male 21-50 ng/L:  Consistent with possible cardiac damage and possible increased clinical   risk. Serial measurements may help to assess extent of myocardial damage.     >50 ng/L: Consistent with cardiac damage, increased clinical risk and  myocardial infarction. Serial measurements may help assess extent of   myocardial damage.      NOTE: Children less than 1 year old may have higher baseline troponin   levels and results should be interpreted in conjunction with the overall   clinical context.     NOTE: Troponin I testing is performed using a different   testing methodology at [facility] than at other   [facility]. Direct result comparisons should only   be made within the same method.   SERIAL TROPONIN, 1 HOUR - Normal    Troponin I, High Sensitivity 9      Narrative:     Less than 99th percentile of normal range cutoff-  Female and children under 18 years old <14 ng/L; Male <21 ng/L: Negative  Repeat testing  should be performed if clinically indicated.     Female and children under 18 years old 14-50 ng/L; Male 21-50 ng/L:  Consistent with possible cardiac damage and possible increased clinical   risk. Serial measurements may help to assess extent of myocardial damage.     >50 ng/L: Consistent with cardiac damage, increased clinical risk and  myocardial infarction. Serial measurements may help assess extent of   myocardial damage.      NOTE: Children less than 1 year old may have higher baseline troponin   levels and results should be interpreted in conjunction with the overall   clinical context.     NOTE: Troponin I testing is performed using a different   testing methodology at [facility] than at other   [facility]. Direct result comparisons should only   be made within the same method.   TROPONIN SERIES- (INITIAL, 1 HR)    Narrative:     The following orders were created for panel order Troponin I Series, High Sensitivity (0, 1 HR).  Procedure                               Abnormality         Status                     ---------                               -----------         ------                     Troponin I, High Sensiti...[[id number]]  Normal              Final result               Troponin, High Sensitivi...[[id number]]  Normal              Final result                 Please view results for these tests on the individual orders.   APTT   CBC      CT angio chest for pulmonary embolism   Final Result   Multifocal bilateral pulmonary embolism as described. No CT evidence   of right heart strain at this time.        Mild nonspecific bilateral hilar adenopathy, right greater than left,   and also mediastinal adenopathy.        Solitary pulmonary nodule: Smoothly marginated but lobulated   noncalcified central right upper lobe lung nodule measuring 14 x 11   mm today, compared to 11 x 10 mm on 11/06/2023. This could be a   slowly enlarging neoplasm. Recommend PET-CT scan in follow-up.         Mild nonspecific but grossly stable mediastinal and bilateral hilar   (right greater than left) adenopathy, perhaps reactive. This could   also be further evaluated with PET-CT scan.        Bilateral renal cysts.        Stable compression fractures with anterior wedging at L1 and at T4.        MACRO:   None        Signed by: Sandeep Kidd 3/4/2025 2:38 PM   Dictation workstation:   [id number]      Lower extremity venous duplex right   Final Result   Negative study.  No deep venous thrombosis of the  right lower   extremity.        MACRO:   None        Signed by: Joseph Celeste 3/4/2025 1:22 PM   Dictation workstation:   [id number]      XR chest 1 view   Final Result   No evidence of acute intrathoracic abnormality.        Signed by: Andrea Hartmann 3/4/2025 12:05 PM   Dictation workstation:   [id number]           Considerations/further MDM:  Patient is a 64-year-old male presenting for evaluation of shortness of breath    Differential diagnosis associated with the patient presentation includes: ACS versus pneumonia versus viral illness versus PE versus DVT versus CHF    Patient is awake alert in no apparent distress during the ER visit.  He has no evidence of airway compromise or respiratory distress.  His vital signs are significant for tachycardia otherwise unremarkable he is normotensive and on room air saturating 98%.  EKG performed upon arrival does show evidence of T wave inversions that appear slightly worse than normal per attending physician interpretation.  Otherwise unremarkable.  Laboratory workup is with elevated D-dimer but otherwise normal cardiac enzymes flat upon repeat and BNP is within normal limits.  Duplex ultrasound of the right lower extremity performed without evidence of acute DVT but CT angio was pursued with evidence of multilobar pulmonary emboli likely the etiology of the patient's symptoms.  There is evidence of pulmonary nodules along which has enlarged compared to previous CT  scan performed in 2023 that is concerning for possible developing neoplasm.  I did discuss plan of care with the pulmonary embolism team at [facility].  Their recommendations include IV heparin drip and admission for further evaluation and management of symptoms.  Did discuss this plan of care with the patient.  He has no contraindications to IV heparin at this time thus IV heparin was ordered.  The patient is admitted to hospitalist service for further management.        Procedure  Critical Care    Performed by: Bozena Engle PA-C  Authorized by: Zach Wright DO    Critical care provider statement:     Critical care time (minutes):  25    Critical care time was exclusive of:  Separately billable procedures and treating other patients    Critical care was time spent personally by me on the following activities:  Development of treatment plan with patient or surrogate, examination of patient, obtaining history from patient or surrogate, discussions with consultants, ordering and performing treatments and interventions, ordering and review of laboratory studies, ordering and review of radiographic studies and re-evaluation of patient's condition    Care discussed with: admitting provider         Bozena Engle PA-C  03/04/25 9334

## 2025-03-05 ENCOUNTER — APPOINTMENT (OUTPATIENT)
Dept: CARDIOLOGY | Facility: HOSPITAL | Age: 65
End: 2025-03-05
Payer: MEDICAID

## 2025-03-05 LAB
ANION GAP SERPL CALCULATED.3IONS-SCNC: 10 MMOL/L (ref 10–20)
AORTIC VALVE MEAN GRADIENT: 2 MMHG
AORTIC VALVE PEAK VELOCITY: 1.09 M/S
APTT PPP: 101.9 SECONDS (ref 22–32.5)
APTT PPP: 47.8 SECONDS (ref 22–32.5)
APTT PPP: 62.2 SECONDS (ref 22–32.5)
AV PEAK GRADIENT: 5 MMHG
AVA (PEAK VEL): 1.84 CM2
AVA (VTI): 1.88 CM2
BUN SERPL-MCNC: 26 MG/DL (ref 6–23)
CALCIUM SERPL-MCNC: 10.2 MG/DL (ref 8.6–10.3)
CHLORIDE SERPL-SCNC: 105 MMOL/L (ref 98–107)
CO2 SERPL-SCNC: 24 MMOL/L (ref 21–32)
CREAT SERPL-MCNC: 1.23 MG/DL (ref 0.5–1.3)
EGFRCR SERPLBLD CKD-EPI 2021: 66 ML/MIN/1.73M*2
EJECTION FRACTION APICAL 4 CHAMBER: 64.9
EJECTION FRACTION: 53 %
ERYTHROCYTE [DISTWIDTH] IN BLOOD BY AUTOMATED COUNT: 14 % (ref 11.5–14.5)
EST. AVERAGE GLUCOSE BLD GHB EST-MCNC: 108 MG/DL
GLUCOSE SERPL-MCNC: 96 MG/DL (ref 74–99)
HBA1C MFR BLD: 5.4 %
HCT VFR BLD AUTO: 44.2 % (ref 41–52)
HGB BLD-MCNC: 14.3 G/DL (ref 13.5–17.5)
LEFT VENTRICULAR OUTFLOW TRACT DIAMETER: 2 CM
MCH RBC QN AUTO: 28 PG (ref 26–34)
MCHC RBC AUTO-ENTMCNC: 32.4 G/DL (ref 32–36)
MCV RBC AUTO: 87 FL (ref 80–100)
MITRAL VALVE E/A RATIO: 0.57
NRBC BLD-RTO: 0 /100 WBCS (ref 0–0)
PLATELET # BLD AUTO: 240 X10*3/UL (ref 150–450)
POTASSIUM SERPL-SCNC: 4.2 MMOL/L (ref 3.5–5.3)
RBC # BLD AUTO: 5.1 X10*6/UL (ref 4.5–5.9)
RIGHT VENTRICLE FREE WALL PEAK S': 10 CM/S
RIGHT VENTRICLE PEAK SYSTOLIC PRESSURE: 34.6 MMHG
SODIUM SERPL-SCNC: 135 MMOL/L (ref 136–145)
TRICUSPID ANNULAR PLANE SYSTOLIC EXCURSION: 1.7 CM
WBC # BLD AUTO: 5.8 X10*3/UL (ref 4.4–11.3)

## 2025-03-05 PROCEDURE — 2060000001 HC INTERMEDIATE ICU ROOM DAILY

## 2025-03-05 PROCEDURE — 93306 TTE W/DOPPLER COMPLETE: CPT | Performed by: INTERNAL MEDICINE

## 2025-03-05 PROCEDURE — 36415 COLL VENOUS BLD VENIPUNCTURE: CPT | Performed by: INTERNAL MEDICINE

## 2025-03-05 PROCEDURE — 85730 THROMBOPLASTIN TIME PARTIAL: CPT

## 2025-03-05 PROCEDURE — C8929 TTE W OR WO FOL WCON,DOPPLER: HCPCS

## 2025-03-05 PROCEDURE — 2500000002 HC RX 250 W HCPCS SELF ADMINISTERED DRUGS (ALT 637 FOR MEDICARE OP, ALT 636 FOR OP/ED): Performed by: INTERNAL MEDICINE

## 2025-03-05 PROCEDURE — 99254 IP/OBS CNSLTJ NEW/EST MOD 60: CPT | Performed by: INTERNAL MEDICINE

## 2025-03-05 PROCEDURE — 2500000001 HC RX 250 WO HCPCS SELF ADMINISTERED DRUGS (ALT 637 FOR MEDICARE OP): Performed by: INTERNAL MEDICINE

## 2025-03-05 PROCEDURE — 99255 IP/OBS CONSLTJ NEW/EST HI 80: CPT

## 2025-03-05 PROCEDURE — 2500000004 HC RX 250 GENERAL PHARMACY W/ HCPCS (ALT 636 FOR OP/ED): Performed by: INTERNAL MEDICINE

## 2025-03-05 PROCEDURE — 2500000004 HC RX 250 GENERAL PHARMACY W/ HCPCS (ALT 636 FOR OP/ED)

## 2025-03-05 PROCEDURE — 82374 ASSAY BLOOD CARBON DIOXIDE: CPT | Performed by: INTERNAL MEDICINE

## 2025-03-05 PROCEDURE — 85027 COMPLETE CBC AUTOMATED: CPT | Performed by: INTERNAL MEDICINE

## 2025-03-05 PROCEDURE — 85730 THROMBOPLASTIN TIME PARTIAL: CPT | Performed by: INTERNAL MEDICINE

## 2025-03-05 PROCEDURE — 99233 SBSQ HOSP IP/OBS HIGH 50: CPT | Performed by: INTERNAL MEDICINE

## 2025-03-05 RX ADMIN — HEPARIN SODIUM 2000 UNITS/HR: 10000 INJECTION, SOLUTION INTRAVENOUS at 00:36

## 2025-03-05 RX ADMIN — DULOXETINE HYDROCHLORIDE 30 MG: 30 CAPSULE, DELAYED RELEASE ORAL at 20:40

## 2025-03-05 RX ADMIN — HEPARIN SODIUM 1500 UNITS/HR: 10000 INJECTION, SOLUTION INTRAVENOUS at 17:33

## 2025-03-05 RX ADMIN — RISPERIDONE 0.25 MG: 0.25 TABLET, FILM COATED ORAL at 08:18

## 2025-03-05 RX ADMIN — ASPIRIN 81 MG: 81 TABLET, COATED ORAL at 08:18

## 2025-03-05 RX ADMIN — BUPROPION HYDROCHLORIDE 300 MG: 300 TABLET, EXTENDED RELEASE ORAL at 08:19

## 2025-03-05 RX ADMIN — METOPROLOL SUCCINATE 25 MG: 25 TABLET, EXTENDED RELEASE ORAL at 20:48

## 2025-03-05 RX ADMIN — PERFLUTREN 3 ML OF DILUTION: 6.52 INJECTION, SUSPENSION INTRAVENOUS at 11:32

## 2025-03-05 RX ADMIN — PRIMIDONE 50 MG: 50 TABLET ORAL at 20:40

## 2025-03-05 RX ADMIN — DULOXETINE HYDROCHLORIDE 30 MG: 30 CAPSULE, DELAYED RELEASE ORAL at 08:18

## 2025-03-05 RX ADMIN — Medication 5000 UNITS: at 08:19

## 2025-03-05 ASSESSMENT — ENCOUNTER SYMPTOMS
ADENOPATHY: 0
NAUSEA: 0
DIARRHEA: 0
LIGHT-HEADEDNESS: 0
DIZZINESS: 0
FEVER: 0
DYSPNEA ON EXERTION: 1
VOMITING: 0
DYSURIA: 0
SORE THROAT: 0
ARTHRALGIAS: 0
ABDOMINAL PAIN: 0
SHORTNESS OF BREATH: 1
UNEXPECTED WEIGHT CHANGE: 0
CONSTIPATION: 0
PALPITATIONS: 0
COUGH: 0
ABDOMINAL DISTENTION: 0
CHILLS: 0

## 2025-03-05 ASSESSMENT — COGNITIVE AND FUNCTIONAL STATUS - GENERAL
MOBILITY SCORE: 24
CLIMB 3 TO 5 STEPS WITH RAILING: A LITTLE
DAILY ACTIVITIY SCORE: 24
DAILY ACTIVITIY SCORE: 24
MOBILITY SCORE: 23
CLIMB 3 TO 5 STEPS WITH RAILING: A LITTLE
MOBILITY SCORE: 23
DAILY ACTIVITIY SCORE: 24

## 2025-03-05 ASSESSMENT — PAIN SCALES - GENERAL
PAINLEVEL_OUTOF10: 0 - NO PAIN

## 2025-03-05 ASSESSMENT — PAIN - FUNCTIONAL ASSESSMENT
PAIN_FUNCTIONAL_ASSESSMENT: 0-10
PAIN_FUNCTIONAL_ASSESSMENT: FLACC (FACE, LEGS, ACTIVITY, CRY, CONSOLABILITY)

## 2025-03-05 NOTE — CONSULTS
Inpatient consult to Pulmonology  Consult performed by: Higinio Branham MD  Consult ordered by: Syl Hobbs MD  Reason for consult: Pulmonary emboli      Department of Medicine  Division of Pulmonary, Critical Care, and Sleep Medicine  Consultation Note      History Of Present Illness:    Brandin Barfield is a 64 y.o. male hypertension, HFrEF (LVEF 35 to 40% as of 2024) NICM, CKD, past tobacco abuse who presented to [facility] ED with complaints of acute onset shortness of breath.  Pulmonary team was consulted for further workup and comanagement of pulmonary emboli.    Per patient has been having shortness of breath over the last few months.  States that he has bilateral lower extremity edema.  States that he has heart failure and he has been following with his cardiologist for further management.  Attributed the shortness of breath to the heart failure.  States yesterday he had an acute worsening which prompted him to come in.  Denies any loss of consciousness or syncopal episodes.  Patient states he never has heard about a pulmonary nodule before.  States he was not told about it before.  This willing to make follow-up for further care.  Patient stated that he smoked 3 packs/day since age of 9 till age 42.  Furthermore he quit at 42.  States he craves a cigarette every once in a while but has not smoked since.    Patient seen and assessed in the ED by the ED provider and worked up with both laboratory studies and imaging studies.  BMP was significant for hyperglycemia to 112, hypochloremia to 108, elevated BUN and creatinine of 33 and 1.3 respectively, elevated calcium of 10.8.  Estimated GFR is 57.  Troponin was trended within normal limits.  BNP was within normal limits.  D-dimer was elevated.  CBC was unremarkable.  Patient has CT angio chest performed which showed multifocal bilateral pulmonary emboli right upper lobe lung nodule which is slightly increased in size since his last scan in 2023.       PERT team was called and advised against thrombectomy and thrombolysis.  Advised heparin GTT and admission to monitored unit.     Review of systems:   Review of Systems   Constitutional:  Negative for chills, fever and unexpected weight change.   HENT:  Negative for congestion and sore throat.    Eyes:  Negative for visual disturbance.   Respiratory:  Positive for shortness of breath. Negative for cough.    Cardiovascular:  Positive for leg swelling. Negative for chest pain and palpitations.   Gastrointestinal:  Negative for abdominal distention, abdominal pain, constipation, diarrhea, nausea and vomiting.   Endocrine: Negative for cold intolerance and heat intolerance.   Genitourinary:  Negative for dysuria.   Musculoskeletal:  Negative for arthralgias.   Skin:  Negative for rash.   Allergic/Immunologic: Negative for immunocompromised state.   Neurological:  Negative for dizziness and light-headedness.   Hematological:  Negative for adenopathy.   Psychiatric/Behavioral:  Negative for behavioral problems.       A 10+ point ROS was completed and otherwise negative except as noted above and per HPI.    Past Medical History:  Past Medical History:   Diagnosis Date    ADHD (attention deficit hyperactivity disorder) 1966    Allergic     Anxiety 1990    Arrhythmia     Arthritis 2020    CHF (congestive heart failure) 10/22    Depression 1990    Eczema During winters    Fracture of lumbar spine (Multi)     H/O supraventricular tachycardia     Headache 1980    Hyperlipidemia     Hypertension        Past Surgical History:  Past Surgical History:   Procedure Laterality Date    CARDIAC CATHETERIZATION  03/23    CARDIAC ELECTROPHYSIOLOGY STUDY AND ABLATION      MR HEAD ANGIO WO IV CONTRAST  06/21/2017    MR HEAD ANGIO WO IV CONTRAST 6/21/2017 [facility]    MR NECK ANGIO WO IV CONTRAST  06/21/2017    MR NECK ANGIO WO IV CONTRAST 6/21/2017 [facility]    OTHER SURGICAL HISTORY  11/07/2022    No history of  surgery        Family History:  Family History   Problem Relation Name Age of Onset    Coronary artery disease Father Fernando Urbanoct     Heart disease Father Fernando Barfield     Coronary artery disease Brother Fidel Barfield     Drug abuse Brother Fidel Barfield     Depression Mother Betsy Barfield     Hypertension Mother Betsy Barfield     Mental illness Mother Betsy Barfield         Social History:   reports that he quit smoking about 10 months ago. His smoking use included cigarettes. He started smoking about 56 years ago. He has a 284.4 pack-year smoking history. He has never used smokeless tobacco. He reports that he does not currently use alcohol. He reports that he does not use drugs.    Objective     Last Recorded Vitals:  Vitals:    03/04/25 2031 03/05/25 0019 03/05/25 0440 03/05/25 0802   BP: 124/80 116/73 103/66 123/82   BP Location: Left arm Left arm Left arm Left arm   Patient Position: Lying Lying Lying Lying   Pulse: 80 83 71 71   Resp: 17 19 17 16   Temp: 36.6 °C (97.9 °F) 36.8 °C (98.3 °F) 36.5 °C (97.7 °F) 36.2 °C (97.2 °F)   TempSrc: Temporal Temporal Temporal Temporal   SpO2: 94% 95% 92% 95%   Weight:   149 kg (328 lb 14.8 oz)    Height:           Oxygen Therapy  SpO2: 95 %  Medical Gas Therapy: None (Room air)          Physical Exam:  Physical Exam  Constitutional:       General: He is not in acute distress.     Appearance: He is obese. He is not toxic-appearing.   HENT:      Head: Normocephalic and atraumatic.      Nose: Nose normal.      Mouth/Throat:      Pharynx: Oropharynx is clear.   Eyes:      Extraocular Movements: Extraocular movements intact.   Neck:      Comments: No visualized masses  Cardiovascular:      Rate and Rhythm: Normal rate and regular rhythm.      Heart sounds: No murmur heard.     No friction rub. No gallop.   Pulmonary:      Effort: No respiratory distress.      Breath sounds: Normal breath sounds. No wheezing, rhonchi or rales.    Abdominal:      General: There is no distension.      Palpations: Abdomen is soft.      Tenderness: There is no abdominal tenderness. There is no guarding.   Musculoskeletal:         General: No tenderness.      Right lower leg: Edema present.      Left lower leg: Edema present.   Skin:     General: Skin is warm and dry.   Neurological:      General: No focal deficit present.      Mental Status: He is alert and oriented to person, place, and time.   Psychiatric:         Mood and Affect: Mood normal.         Allergies:  Allergies   Allergen Reactions    Vibramycin [Doxycycline Calcium] Swelling    Doxycycline Unknown       Inpatient Medications:  aspirin, 81 mg, oral, Daily  buPROPion XL, 300 mg, oral, Daily  cholecalciferol, 5,000 Units, oral, Daily  DULoxetine, 30 mg, oral, BID  metoprolol succinate XL, 25 mg, oral, q PM  perflutren lipid microspheres, 0.5-10 mL of dilution, intravenous, Once in imaging  perflutren protein A microsphere, 0.5 mL, intravenous, Once in imaging  primidone, 250 mg, oral, Nightly  risperiDONE, 0.25 mg, oral, Daily  sulfur hexafluoride microsphr, 2 mL, intravenous, Once in imaging      heparin, 0-4,500 Units/hr, Last Rate: 1,500 Units/hr (03/05/25 0819)      PRN medications: acetaminophen, alum-mag hydroxide-simeth, heparin (porcine), melatonin, ondansetron    Outpatient Medications:  Prior to Admission medications    Medication Sig Start Date End Date Taking? Authorizing Provider   alendronate (Fosamax) 10 mg tablet Take 1 tablet (10 mg) by mouth once daily. 1/14/25  Yes Benjamin Dowell MD   aspirin 81 mg EC tablet Take 1 tablet (81 mg) by mouth once daily. 11/7/23  Yes Griselda Avila, APRN-CNP   buPROPion XL (Wellbutrin XL) 300 mg 24 hr tablet Take 1 tablet (300 mg) by mouth once daily. Do not crush, chew, or split. Total dose 450mg 1/14/25 7/13/25 Yes Benjamin Dowell MD   cholecalciferol (Vitamin D-3) 5,000 Units tablet Take 1 tablet (5,000 Units) by mouth once daily.  11/22/22  Yes Benjamin Dowell MD   DULoxetine (Cymbalta) 30 mg DR capsule Take 1 capsule (30 mg) by mouth 2 times a day. Do not crush or chew.   Yes Historical Provider, MD   empagliflozin (Jardiance) 10 mg Take 1 tablet (10 mg) by mouth once daily. 1/14/25 1/14/26 Yes Benjamin Dowell MD   metFORMIN  mg 24 hr tablet Take 1 tablet (500 mg) by mouth once daily in the evening. Take with meals. Do not crush, chew, or split. 1/14/25 1/14/26 Yes Benjamin Dowell MD   metoprolol succinate XL (Toprol-XL) 25 mg 24 hr tablet Take 1 tablet (25 mg) by mouth once daily in the evening. Do not crush or chew. 4/17/24 4/17/25 Yes Walt Boss,    paliperidone (Invega) 1.5 mg 24 hr tablet Take 1 tablet (1.5 mg) by mouth once daily at bedtime. 12/5/24  Yes Historical Provider, MD   primidone (Mysoline) 250 mg tablet Take 1 tablet (250 mg) by mouth once daily at bedtime. 1/14/25 10/11/25 Yes Benjamin Dowell MD   sacubitriL-valsartan (Entresto) 24-26 mg tablet Take 1 tablet by mouth 2 times a day. 1/14/25  Yes Benjamin Dowell MD   apixaban (Eliquis) 5 mg (74 tabs) tablet Take 2 tablets (10 mg) by mouth 2 times a day for 7 days, then take 1 tablet (5 mg) by mouth 2 times a day. 3/5/25   Syl Hobbs MD   buPROPion XL (Wellbutrin XL) 150 mg 24 hr tablet Take 1 tablet (150 mg) by mouth once daily in the morning. Do not crush, chew, or split. Total dose 450mg  Patient not taking: Reported on 3/4/2025 1/14/25 7/13/25  Benjamin Dowell MD   hydrOXYzine pamoate (VistariL) 25 mg capsule Take 1 to 2 capsules at bedtime.  Patient not taking: Reported on 3/4/2025 8/2/24   Benjamin Dowell MD   ipratropium (Atrovent) 21 mcg (0.03 %) nasal spray Administer 2 sprays into each nostril every 12 hours.  Patient not taking: Reported on 2/5/2025 4/11/24 4/11/25  Benjamin Dowell MD   melatonin 3 mg tablet 2 hours before desired bed time, for 3 weeks  Patient not taking: Reported on 3/4/2025 8/2/24   Benjamin Dowell MD        LABS/IMAGING/DIAGNOSTICS REVIEW:    Labs:  Lab Results   Component Value Date    WBC 5.8 03/05/2025    HGB 14.3 03/05/2025    HCT 44.2 03/05/2025    MCV 87 03/05/2025     03/05/2025      Lab Results   Component Value Date    GLUCOSE 96 03/05/2025    CALCIUM 10.2 03/05/2025     (L) 03/05/2025    K 4.2 03/05/2025    CO2 24 03/05/2025     03/05/2025    BUN 26 (H) 03/05/2025    CREATININE 1.23 03/05/2025      Lab Results   Component Value Date    ALT 21 03/04/2025    AST 19 03/04/2025    ALKPHOS 77 03/04/2025    BILITOT 0.3 03/04/2025        CT angio chest for pulmonary embolism 03/04/2025    Narrative  Interpreted By:  Sandeep Kidd,  STUDY:  CT ANGIO CHEST FOR PULMONARY EMBOLISM;  3/4/2025 2:07 pm    INDICATION:  65 y/o   M with  Signs/Symptoms:Shortness of breath, elevated  D-dimer, assess for PE.      LIMITATIONS:  None.    ACCESSION NUMBER(S):  [id number]    ORDERING CLINICIAN:  PREET STANTON    TECHNIQUE:  After the administration of intravenous nonionic contrast,  thin-section arterial phase images were obtained  from the thoracic  inlet down through the iliac crest. Sagittal and coronal  reconstruction images were generated. Axial and coronal MIP vascular  reconstruction images were also generated.   Mediastinal, lung, bone,  and liver windows were reviewed. Omnipaque 350   75 ML      COMPARISON:  Comparison study is from 11/06/2023. Correlation with chest x-ray  from earlier this afternoon.    FINDINGS:  CHEST WALL/BASE OF THE NECK:  The chest wall was grossly intact.  No thyromegaly or thyroid mass.    MEDIASTINUM/OSORIO:  Mild nonspecific right hilar adenopathy with lymph nodes measuring up  to 22 mm in short axis. There is very mild left hilar adenopathy with  lymph nodes measuring up to 9 mm short axis. There is subcarinal  adenopathy measuring 16 mm short axis. There is mild central  mediastinal adenopathy with lymph nodes measuring up to 14 mm short  axis. Scant small fat centered  lymph nodes in each axilla, most  likely reactive. No cardiomegaly.  No pericardial effusion.  No thoracic aortic aneurysm or dissection.  For technical reasons or perhaps related to patient body habitus,  there is suboptimal opacification of the pulmonary arteries. Despite  this, there are segmental proximal left lower lobe pulmonary artery  filling defects consistent with pulmonary emboli, and there are  pulmonary emboli in the proximal right middle lobe pulmonary artery,  in the proximal right lower lobe pulmonary artery, and in several  right lower lobe segmental pulmonary arteries. There is also a  segmental posterior right upper lobe pulmonary artery filling defect  consistent with embolism. No CT evidence of right heart strain.    LUNGS/ PLEURA/ AND TRACHEA:  On the prior exam, there was a right-sided perihilar pneumonia, with  a small adjacent nodule lateral to this in the right upper lobe that  previously measured 11 x 10 mm. Today, there is a more smoothly  marginated but still lobulated nodule in the right lower lobe in this  area that measures 14 x 11 mm, larger than on the previous exam.  There is no other abnormal density in either lung. No  pleural  effusion or pneumothorax. The trachea was grossly intact.    BONES:  No destructive lytic or blastic bone lesion. Stable superior endplate  compression fracture with anterior wedging at L1. Mild stable  superior endplate compression fracture with anterior wedging at T4.  No acute thoracic spine compression fracture based on this exam. The  sternum is intact.    UPPER ABDOMEN:  There is a posteromedial midpole right renal cyst measuring 25 mm in  diameter. There is a posterior mid to lower pole left renal cyst  measuring 33 mm and a central midpole left renal cyst measuring 42  mm. There is mild-to-moderate stool throughout the imaged upper  colon. The remainder of the imaged upper abdomen was grossly intact.    Impression  Multifocal bilateral pulmonary  "embolism as described. No CT evidence  of right heart strain at this time.    Mild nonspecific bilateral hilar adenopathy, right greater than left,  and also mediastinal adenopathy.    Solitary pulmonary nodule: Smoothly marginated but lobulated  noncalcified central right upper lobe lung nodule measuring 14 x 11  mm today, compared to 11 x 10 mm on 11/06/2023. This could be a  slowly enlarging neoplasm. Recommend PET-CT scan in follow-up.    Mild nonspecific but grossly stable mediastinal and bilateral hilar  (right greater than left) adenopathy, perhaps reactive. This could  also be further evaluated with PET-CT scan.    Bilateral renal cysts.    Stable compression fractures with anterior wedging at L1 and at T4.    MACRO:  None    Signed by: Sandeep Kidd 3/4/2025 2:38 PM  Dictation workstation:   [id number]    XR chest 1 view 03/04/2025    Narrative  Interpreted By:  Andrea Hartmann,  STUDY:  XR CHEST 1 VIEW    INDICATION:  Signs/Symptoms:Shortness of breath.    COMPARISON:  CT of the chest performed November 6, 2023    ACCESSION NUMBER(S):  [id number]    ORDERING CLINICIAN:  PREET STANTON    FINDINGS:  No consolidation, effusion, edema, or pneumothorax. Heart size upper  limits of normal.    Impression  No evidence of acute intrathoracic abnormality.    Signed by: Andrea Hartmann 3/4/2025 12:05 PM  Dictation workstation:   [id number]      Pulmonary Functions Testing Results:  No results found for: \"FEV1\", \"FVC\", \"EEO3PQK\", \"TLC\", \"DLCO\"     Relevant imaging results were personally reviewed.      Assessment/Plan     Brandin Barfield is a 64 y.o. male hypertension, HFrEF (LVEF 35 to 40% as of 2024) NICM, CKD, past tobacco abuse (quit in 2015) who presented to [facility] ED with complaints of acute onset shortness of breath.  Pulmonary team was consulted for further workup and comanagement of pulmonary emboli.    Patient was seen and assessed by myself and his chart was reviewed for previous pulmonary visits, Labs and " imaging.  Patient presented for acute shortness of breath.  Patient has a history of heart failure for which she has been followed for by cardiology.  Patient CT scan shows multiple pulmonary emboli.  PERT team recommended no thrombectomy or thrombolysis.  Patient has been stable in terms of laboratory studies and his hemodynamic status.  Patient is currently on a heparin GTT and will need to transition to a DOAC.  Patient will also need further workup for right upper lobe nodule as an outpatient with a PET scan and follow-up with pulmonary for further planning if biopsy is needed.    Assessment:  #Bilateral pulmonary emboli  #HFrEF  #Right upper lobe lung nodule  #REBECA on CKD    Recommendations:  -Will follow-up echocardiogram  -Check bilateral lower extremity Dopplers  -Further workup for other sources of malignancy defer; to primary team  -Continuous telemetry and SpO2 monitoring  -Continue heparin GTT  -Will likely need to transition to DOAC on discharge  -Will need PET scan and further follow-up as an outpatient with pulmonary.  Will also need pulmonary function testing and 6-minute walk test.  Will discuss biopsy needs at that point in time after PET scan is performed.      I spent 60 minutes in the professional and overall care of this patient.    Pulmonary team will continue to follow the patient while they are in house.     Higinio Branham MD, MPH  Pulmonary and Critical Care Medicine     Please excuse any typographical or unwanted errors as voice recognition software was used to complete this note.

## 2025-03-05 NOTE — CARE PLAN
Problem: Pain - Adult  Goal: Verbalizes/displays adequate comfort level or baseline comfort level  Outcome: Progressing     Problem: Safety - Adult  Goal: Free from fall injury  Outcome: Progressing     Problem: Discharge Planning  Goal: Discharge to home or other facility with appropriate resources  Outcome: Progressing     Problem: Chronic Conditions and Co-morbidities  Goal: Patient's chronic conditions and co-morbidity symptoms are monitored and maintained or improved  Outcome: Progressing     Problem: Nutrition  Goal: Nutrient intake appropriate for maintaining nutritional needs  Outcome: Progressing     Problem: Fall/Injury  Goal: Not fall by end of shift  Outcome: Progressing  Goal: Be free from injury by end of the shift  Outcome: Progressing  Goal: Verbalize understanding of personal risk factors for fall in the hospital  Outcome: Progressing  Goal: Verbalize understanding of risk factor reduction measures to prevent injury from fall in the home  Outcome: Progressing  Goal: Use assistive devices by end of the shift  Outcome: Progressing  Goal: Pace activities to prevent fatigue by end of the shift  Outcome: Progressing     Problem: Pain  Goal: Takes deep breaths with improved pain control throughout the shift  Outcome: Progressing  Goal: Turns in bed with improved pain control throughout the shift  Outcome: Progressing  Goal: Walks with improved pain control throughout the shift  Outcome: Progressing  Goal: Performs ADL's with improved pain control throughout shift  Outcome: Progressing  Goal: Participates in PT with improved pain control throughout the shift  Outcome: Progressing  Goal: Free from opioid side effects throughout the shift  Outcome: Progressing  Goal: Free from acute confusion related to pain meds throughout the shift  Outcome: Progressing   The patient's goals for the shift include      The clinical goals for the shift include heparin gtt    Over the shift, the patient did not make  progress toward the following goals. Barriers to progression include . Recommendations to address these barriers include .

## 2025-03-05 NOTE — PROGRESS NOTES
Brandin Barfield is a 64 y.o. male on day 1 of admission presenting with Pulmonary embolism and infarction (Multi).      Subjective   Patient feels better.  Denies chest pain.  Denies shortness of breath.  He is stable on room air.  Tolerates heparin drip.       Objective     Last Recorded Vitals  /82 (BP Location: Left arm, Patient Position: Lying)   Pulse 71   Temp 36.2 °C (97.2 °F) (Temporal)   Resp 16   Wt 149 kg (328 lb 14.8 oz)   SpO2 95%   Intake/Output last 3 Shifts:    Intake/Output Summary (Last 24 hours) at 3/5/2025 1019  Last data filed at 3/5/2025 0400  Gross per 24 hour   Intake 435.67 ml   Output 0 ml   Net 435.67 ml       Admission Weight  Weight: 147 kg (323 lb) (03/04/25 1134)    Daily Weight  03/05/25 : 149 kg (328 lb 14.8 oz)    Image Results  CT angio chest for pulmonary embolism  Narrative: Interpreted By:  Sandeep Kidd,   STUDY:  CT ANGIO CHEST FOR PULMONARY EMBOLISM;  3/4/2025 2:07 pm      INDICATION:  65 y/o   M with  Signs/Symptoms:Shortness of breath, elevated  D-dimer, assess for PE.          LIMITATIONS:  None.      ACCESSION NUMBER(S):  [id number]      ORDERING CLINICIAN:  PREET STANTON      TECHNIQUE:  After the administration of intravenous nonionic contrast,  thin-section arterial phase images were obtained  from the thoracic  inlet down through the iliac crest. Sagittal and coronal  reconstruction images were generated. Axial and coronal MIP vascular  reconstruction images were also generated.   Mediastinal, lung, bone,  and liver windows were reviewed. Omnipaque 350   75 ML          COMPARISON:  Comparison study is from 11/06/2023. Correlation with chest x-ray  from earlier this afternoon.      FINDINGS:  CHEST WALL/BASE OF THE NECK:  The chest wall was grossly intact.  No thyromegaly or thyroid mass.      MEDIASTINUM/OSORIO:  Mild nonspecific right hilar adenopathy with lymph nodes measuring up  to 22 mm in short axis. There is very mild left hilar adenopathy with  lymph  nodes measuring up to 9 mm short axis. There is subcarinal  adenopathy measuring 16 mm short axis. There is mild central  mediastinal adenopathy with lymph nodes measuring up to 14 mm short  axis. Scant small fat centered lymph nodes in each axilla, most  likely reactive. No cardiomegaly.  No pericardial effusion.  No thoracic aortic aneurysm or dissection.  For technical reasons or perhaps related to patient body habitus,  there is suboptimal opacification of the pulmonary arteries. Despite  this, there are segmental proximal left lower lobe pulmonary artery  filling defects consistent with pulmonary emboli, and there are  pulmonary emboli in the proximal right middle lobe pulmonary artery,  in the proximal right lower lobe pulmonary artery, and in several  right lower lobe segmental pulmonary arteries. There is also a  segmental posterior right upper lobe pulmonary artery filling defect  consistent with embolism. No CT evidence of right heart strain.      LUNGS/ PLEURA/ AND TRACHEA:  On the prior exam, there was a right-sided perihilar pneumonia, with  a small adjacent nodule lateral to this in the right upper lobe that  previously measured 11 x 10 mm. Today, there is a more smoothly  marginated but still lobulated nodule in the right lower lobe in this  area that measures 14 x 11 mm, larger than on the previous exam.  There is no other abnormal density in either lung. No  pleural  effusion or pneumothorax. The trachea was grossly intact.      BONES:  No destructive lytic or blastic bone lesion. Stable superior endplate  compression fracture with anterior wedging at L1. Mild stable  superior endplate compression fracture with anterior wedging at T4.  No acute thoracic spine compression fracture based on this exam. The  sternum is intact.      UPPER ABDOMEN:  There is a posteromedial midpole right renal cyst measuring 25 mm in  diameter. There is a posterior mid to lower pole left renal cyst  measuring 33 mm and a  central midpole left renal cyst measuring 42  mm. There is mild-to-moderate stool throughout the imaged upper  colon. The remainder of the imaged upper abdomen was grossly intact.      Impression: Multifocal bilateral pulmonary embolism as described. No CT evidence  of right heart strain at this time.      Mild nonspecific bilateral hilar adenopathy, right greater than left,  and also mediastinal adenopathy.      Solitary pulmonary nodule: Smoothly marginated but lobulated  noncalcified central right upper lobe lung nodule measuring 14 x 11  mm today, compared to 11 x 10 mm on 11/06/2023. This could be a  slowly enlarging neoplasm. Recommend PET-CT scan in follow-up.      Mild nonspecific but grossly stable mediastinal and bilateral hilar  (right greater than left) adenopathy, perhaps reactive. This could  also be further evaluated with PET-CT scan.      Bilateral renal cysts.      Stable compression fractures with anterior wedging at L1 and at T4.      MACRO:  None      Signed by: Sandeep Kidd 3/4/2025 2:38 PM  Dictation workstation:   [id number]  Lower extremity venous duplex right  Narrative: Interpreted By:  Joseph Celeste,   STUDY:  [facility] US LOWER EXTREMITY VENOUS DUPLEX RIGHT  3/4/2025 1:17 pm      INDICATION:  65 y/o   M with  Signs/Symptoms:Right leg swelling. LMP:  Unknown.              COMPARISON:  None.      ACCESSION NUMBER(S):  [id number]      ORDERING CLINICIAN:  PREET STANTON      TECHNIQUE:  Routine ultrasound of the  right lower extremity was performed with  duplex Doppler (color and spectral) evaluation.   Static images were  obtained for remote interpretation.      FINDINGS:  THIGH VEINS:  The right common femoral, femoral, popliteal, proximal  medial saphenous, and deep femoral veins are patent and free of  thrombus.  The veins are normally compressible.  They demonstrate  normal phasic flow and augmentation response.          Impression: Negative study.  No deep venous thrombosis of the   right lower  extremity.      MACRO:  None      Signed by: Joseph Celeste 3/4/2025 1:22 PM  Dictation workstation:   [id number]  ECG 12 lead  Normal sinus rhythm  Nonspecific ST and T wave abnormality  Abnormal ECG  When compared with ECG of 06-NOV-2023 08:50,  ST more depressed in Inferior leads  ST no longer depressed in Lateral leads  T wave inversion now evident in Inferior leads  Inverted T waves have replaced nonspecific T wave abnormality in Lateral leads  Confirmed by Benjamin Ureña ([id number]) on 3/4/2025 12:41:41 PM  XR chest 1 view  Narrative: Interpreted By:  Andrea Hartmann,   STUDY:  XR CHEST 1 VIEW      INDICATION:  Signs/Symptoms:Shortness of breath.      COMPARISON:  CT of the chest performed November 6, 2023      ACCESSION NUMBER(S):  [id number]      ORDERING CLINICIAN:  PREET STANTON      FINDINGS:  No consolidation, effusion, edema, or pneumothorax. Heart size upper  limits of normal.      Impression: No evidence of acute intrathoracic abnormality.      Signed by: Andrea Hartmann 3/4/2025 12:05 PM  Dictation workstation:   [id number]      Physical Exam  Pt is NAD.  Cooperative with exam.  In no distress at rest.  A, Ox3.  Face is symmetrical.  Skin - no lesions.  Lungs: clear to auscultations B/L. No wheezes, rales, rhonchi.  Heart: regular S1S2.  Abdomen: soft, NT, ND. BS positive.  Extr.: no edema, cords, cyanosis.  Moves all extr.   Relevant Results             Results for orders placed or performed during the hospital encounter of 03/04/25 (from the past 24 hours)   ECG 12 lead   Result Value Ref Range    Ventricular Rate 100 BPM    Atrial Rate 100 BPM    NH Interval 158 ms    QRS Duration 94 ms    QT Interval 316 ms    QTC Calculation(Bazett) 407 ms    P Axis 7 degrees    R Axis 40 degrees    T Axis -51 degrees    QRS Count 16 beats    Q Onset 223 ms    P Onset 144 ms    P Offset 191 ms    T Offset 381 ms    QTC Fredericia 374 ms   CBC and Auto Differential   Result Value Ref Range    WBC 6.2 4.4 -  11.3 x10*3/uL    nRBC 0.0 0.0 - 0.0 /100 WBCs    RBC 5.37 4.50 - 5.90 x10*6/uL    Hemoglobin 15.4 13.5 - 17.5 g/dL    Hematocrit 46.5 41.0 - 52.0 %    MCV 87 80 - 100 fL    MCH 28.7 26.0 - 34.0 pg    MCHC 33.1 32.0 - 36.0 g/dL    RDW 14.0 11.5 - 14.5 %    Platelets 249 150 - 450 x10*3/uL    Neutrophils % 66.6 40.0 - 80.0 %    Immature Granulocytes %, Automated 0.2 0.0 - 0.9 %    Lymphocytes % 17.4 13.0 - 44.0 %    Monocytes % 8.5 2.0 - 10.0 %    Eosinophils % 6.3 0.0 - 6.0 %    Basophils % 1.0 0.0 - 2.0 %    Neutrophils Absolute 4.13 1.20 - 7.70 x10*3/uL    Immature Granulocytes Absolute, Automated 0.01 0.00 - 0.70 x10*3/uL    Lymphocytes Absolute 1.08 (L) 1.20 - 4.80 x10*3/uL    Monocytes Absolute 0.53 0.10 - 1.00 x10*3/uL    Eosinophils Absolute 0.39 0.00 - 0.70 x10*3/uL    Basophils Absolute 0.06 0.00 - 0.10 x10*3/uL   Comprehensive metabolic panel   Result Value Ref Range    Glucose 112 (H) 74 - 99 mg/dL    Sodium 138 136 - 145 mmol/L    Potassium 4.4 3.5 - 5.3 mmol/L    Chloride 108 (H) 98 - 107 mmol/L    Bicarbonate 24 21 - 32 mmol/L    Anion Gap 10 10 - 20 mmol/L    Urea Nitrogen 33 (H) 6 - 23 mg/dL    Creatinine 1.38 (H) 0.50 - 1.30 mg/dL    eGFR 57 (L) >60 mL/min/1.73m*2    Calcium 10.8 (H) 8.6 - 10.3 mg/dL    Albumin 4.2 3.4 - 5.0 g/dL    Alkaline Phosphatase 77 33 - 136 U/L    Total Protein 7.1 6.4 - 8.2 g/dL    AST 19 9 - 39 U/L    Bilirubin, Total 0.3 0.0 - 1.2 mg/dL    ALT 21 10 - 52 U/L   B-Type Natriuretic Peptide   Result Value Ref Range    BNP 9 0 - 99 pg/mL   D-dimer, quantitative   Result Value Ref Range    D-Dimer Non VTE, Quant (mg/L FEU) 4.27 (H) 0.19 - 0.50 mg/L FEU   Troponin I, High Sensitivity, Initial   Result Value Ref Range    Troponin I, High Sensitivity 9 0 - 20 ng/L   Troponin, High Sensitivity, 1 Hour   Result Value Ref Range    Troponin I, High Sensitivity 9 0 - 20 ng/L   aPTT   Result Value Ref Range    aPTT 24.8 22.0 - 32.5 seconds   CBC   Result Value Ref Range    WBC 6.9 4.4 -  11.3 x10*3/uL    nRBC 0.0 0.0 - 0.0 /100 WBCs    RBC 5.26 4.50 - 5.90 x10*6/uL    Hemoglobin 14.9 13.5 - 17.5 g/dL    Hematocrit 45.7 41.0 - 52.0 %    MCV 87 80 - 100 fL    MCH 28.3 26.0 - 34.0 pg    MCHC 32.6 32.0 - 36.0 g/dL    RDW 14.0 11.5 - 14.5 %    Platelets 262 150 - 450 x10*3/uL   Hemoglobin A1c   Result Value Ref Range    Hemoglobin A1C 5.4 See comment %    Estimated Average Glucose 108 Not Established mg/dL   APTT   Result Value Ref Range    aPTT 101.9 (HH) 22.0 - 32.5 seconds   Basic Metabolic Panel   Result Value Ref Range    Glucose 96 74 - 99 mg/dL    Sodium 135 (L) 136 - 145 mmol/L    Potassium 4.2 3.5 - 5.3 mmol/L    Chloride 105 98 - 107 mmol/L    Bicarbonate 24 21 - 32 mmol/L    Anion Gap 10 10 - 20 mmol/L    Urea Nitrogen 26 (H) 6 - 23 mg/dL    Creatinine 1.23 0.50 - 1.30 mg/dL    eGFR 66 >60 mL/min/1.73m*2    Calcium 10.2 8.6 - 10.3 mg/dL   CBC   Result Value Ref Range    WBC 5.8 4.4 - 11.3 x10*3/uL    nRBC 0.0 0.0 - 0.0 /100 WBCs    RBC 5.10 4.50 - 5.90 x10*6/uL    Hemoglobin 14.3 13.5 - 17.5 g/dL    Hematocrit 44.2 41.0 - 52.0 %    MCV 87 80 - 100 fL    MCH 28.0 26.0 - 34.0 pg    MCHC 32.4 32.0 - 36.0 g/dL    RDW 14.0 11.5 - 14.5 %    Platelets 240 150 - 450 x10*3/uL   aPTT   Result Value Ref Range    aPTT 62.2 (H) 22.0 - 32.5 seconds      Assessment/Plan                  Assessment & Plan  Pulmonary embolism and infarction (Multi)                Bilateral mild multiple pulmonary emboli.  Unprovoked.  No history of DVT PE in the past.  Patient denies any recent surgeries or travels.  He has sedentary lifestyle.  He has lung nodules suspicious for malignancy given history of smoking.  At this point, continue heparin drip and consult pulmonology.  Will likely discharge home on Eliquis.  Patient will need workup to rule out malignancy including CAT scan with IV contrast of the abdomen and pelvis.  Will not order yet given abnormal renal function and IV contrast exposure today.  Acute renal  failure.  Patient had normal renal function in the past.  Will hold Entresto.  Patient was instructed to stop ibuprofen.  Will monitor renal function he will benefit from follow-up with nephrologist which may be done as outpatient and renal function is not getting worse.  Hypertension.  Continue metoprolol  Obesity  History of smoking, quit more than 10 years ago  CODE STATUS, discussed with patient: Full code  Syl Hobbs MD    03/05/2025:    Remains hemodynamically stable.  No hypoxia on room air.  Plan is to transition to oral anticoagulation tomorrow.  I sent to pharmacy prescription for Eliquis, hopefully patient's insurance covers.  Patient has abnormal EKG new compared to previous, depressed ST segment in inferior leads with inverted T waves.  Consulted cardiology.  Patient denies chest pain.  Echocardiogram pending.  Acute renal failure, improved.  Will restart Entresto.    Yony stable, may probably be discharged home tomorrow on oral Eliquis.

## 2025-03-05 NOTE — NURSING NOTE
Received patient ambulating in the bathroom with continuous heparin drip @ 20 ml/hr. Going on per protocol. Report done at bedside. Assisted back to bed, Denies pain or shortness of breath.  Assessment as charted. Will monitor.

## 2025-03-05 NOTE — PROGRESS NOTES
03/05/25 1100   Discharge Planning   Expected Discharge Disposition Home   Does the patient need discharge transport arranged? No     Patient is anticipated to return home with no skilled needs when medically stable.     No TCC/SW or PT/OT consults in place.     Please consult should discharge needs arise.

## 2025-03-05 NOTE — NURSING NOTE
Heparin drip @ 17 ml/hr. Started per protocol. Resting comfortably, nothing unusual noted. Call light in reach. Same previous assessment.

## 2025-03-05 NOTE — CONSULTS
Inpatient consult to Cardiology  Consult performed by: ANT Daley-CNP  Consult ordered by: Syl Hobbs MD  Reason for consult: EKG Changes        History Of Present Illness:    Brandin Barfield is a 64 y.o. male presenting with acute shortness of breath.  He follows Dr. Boss for cardiology.  Past medical history includes hypertensive disorder, nonischemic cardiomyopathy with cardiac catheterization in 2022 revealing mild nonobstructive coronary artery disease, chronic kidney disease, former tobacco use.  Patient reports to me over the last several months he has been having dyspnea on exertion, but yesterday while he was showering he had sudden onset shortness of breath which did not improve.  He also reports intermittent swelling of the right lower extremity over the last several months.  Denies chest pain or pressure.  Denies nausea or vomiting.  His dyspnea prompted presentation to the [facility] emergency department.  Lab work the emergency department significant for sodium of 138, testing 4.44, creatinine 1.38 and hemoglobin of 15.4.  High-sensitivity troponin I levels negative x 2 at 9 and 9.  BNP negative at 9.  EKG in the emergency department revealing T wave inversions in leads II, III and aVF with mild ST depression.  Chest x-ray without evidence of acute intrathoracic abnormality.  CT angio of the chest with multifocal bilateral pulmonary emboli and no evidence of right heart strain.  PERT team was activated who recommended initiating therapeutic heparin and evaluation with lower extremity duplex ultrasound and surface echocardiogram.  They did not feel that mechanical reperfusion was indicated at this time.  The patient was started on a heparin drip and admitted to the hospital on telemetry for further assessment and management.  Cardiology were consulted due to EKG changes.    Review of Systems   Cardiovascular:  Positive for dyspnea on exertion.   Respiratory:  Positive for shortness  of breath.    All other systems reviewed and are negative.        Last Recorded Vitals:  Vitals:    03/04/25 2031 03/05/25 0019 03/05/25 0440 03/05/25 0802   BP: 124/80 116/73 103/66 123/82   BP Location: Left arm Left arm Left arm Left arm   Patient Position: Lying Lying Lying Lying   Pulse: 80 83 71 71   Resp: 17 19 17 16   Temp: 36.6 °C (97.9 °F) 36.8 °C (98.3 °F) 36.5 °C (97.7 °F) 36.2 °C (97.2 °F)   TempSrc: Temporal Temporal Temporal Temporal   SpO2: 94% 95% 92% 95%   Weight:   149 kg (328 lb 14.8 oz)    Height:           Last Labs:  CBC - 3/5/2025:  5:00 AM  5.8 14.3 240    44.2      CMP - 3/5/2025:  5:00 AM  10.2 7.1 19 --- 0.3   _ 4.2 21 77      PTT - 3/5/2025:  7:29 AM  _   _ 62.2     Troponin I, High Sensitivity   Date/Time Value Ref Range Status   03/04/2025 01:43 PM 9 0 - 20 ng/L Final   03/04/2025 12:17 PM 9 0 - 20 ng/L Final     BNP   Date/Time Value Ref Range Status   03/04/2025 12:17 PM 9 0 - 99 pg/mL Final   02/07/2024 09:22 AM 14 0 - 99 pg/mL Final     Hemoglobin A1C   Date/Time Value Ref Range Status   03/04/2025 04:36 PM 5.4 See comment % Final   01/14/2025 03:35 PM 5.4 See comment % Final     LDL Calculated   Date/Time Value Ref Range Status   02/07/2024 09:22  (H) <=99 mg/dL Final     Comment:                                 Near   Borderline      AGE      Desirable  Optimal    High     High     Very High     0-19 Y     0 - 109     ---    110-129   >/= 130     ----    20-24 Y     0 - 119     ---    120-159   >/= 160     ----      >24 Y     0 -  99   100-129  130-159   160-189     >/=190     11/07/2023 05:52  65 - 130 mg/dL Final   06/15/2021 10:13  65 - 130 MG/DL Final   08/08/2019 07:40  65 - 130 MG/DL Final   05/29/2018 09:30  (H) 65 - 130 MG/DL Final     VLDL   Date/Time Value Ref Range Status   02/07/2024 09:22 AM 41 (H) 0 - 40 mg/dL Final   11/29/2022 11:46 AM 29 0 - 40 mg/dL Final      Last I/O:  I/O last 3 completed shifts:  In: 435.7 (2.9 mL/kg) [P.O.:360;  I.V.:75.7 (0.5 mL/kg)]  Out: 0 (0 mL/kg)   Weight: 149.2 kg     Past Cardiology Tests (Last 3 Years):  EKG:  ECG 12 lead 03/04/2025      ECG 12 lead 11/07/2023    Echo:  Transthoracic Echo Complete 04/03/2024      Transthoracic Echo (TTE) Complete 11/07/2023    Ejection Fractions:  EF   Date/Time Value Ref Range Status   11/07/2023 08:48 AM 44       Cath:  No results found for this or any previous visit from the past 1095 days.    Stress Test:  Nuclear Stress Test 11/07/2023      NUCLEAR STRESS TEST 10/07/2022    Cardiac Imaging:  No results found for this or any previous visit from the past 1095 days.      Past Medical History:  He has a past medical history of ADHD (attention deficit hyperactivity disorder) (1966), Allergic, Anxiety (1990), Arrhythmia, Arthritis (2020), CHF (congestive heart failure) (10/22), Depression (1990), Eczema (During winters), Fracture of lumbar spine (Multi), H/O supraventricular tachycardia, Headache (1980), Hyperlipidemia, and Hypertension.    Past Surgical History:  He has a past surgical history that includes Other surgical history (11/07/2022); MR angio head wo IV contrast (06/21/2017); MR angio neck wo IV contrast (06/21/2017); Cardiac catheterization (03/23); and Cardiac electrophysiology study and ablation.      Social History:  He reports that he quit smoking about 10 months ago. His smoking use included cigarettes. He started smoking about 56 years ago. He has a 284.4 pack-year smoking history. He has never used smokeless tobacco. He reports that he does not currently use alcohol. He reports that he does not use drugs.    Family History:  Family History   Problem Relation Name Age of Onset    Coronary artery disease Father Fernando Efren Barfield     Heart disease Father Fernando Lottvyn Lico     Coronary artery disease Brother Fidel Fernando Brafield     Drug abuse Brother Fidel Fernando Barfield     Depression Mother Betsy Leighann Barfield     Hypertension Mother Betsy Leighann Lico      Mental illness Mother Betsy Barfield         Allergies:  Vibramycin [doxycycline calcium] and Doxycycline    Inpatient Medications:  Scheduled medications   Medication Dose Route Frequency    aspirin  81 mg oral Daily    buPROPion XL  300 mg oral Daily    cholecalciferol  5,000 Units oral Daily    DULoxetine  30 mg oral BID    metoprolol succinate XL  25 mg oral q PM    perflutren lipid microspheres  0.5-10 mL of dilution intravenous Once in imaging    perflutren protein A microsphere  0.5 mL intravenous Once in imaging    primidone  250 mg oral Nightly    risperiDONE  0.25 mg oral Daily    sulfur hexafluoride microsphr  2 mL intravenous Once in imaging     PRN medications   Medication    acetaminophen    alum-mag hydroxide-simeth    heparin (porcine)    melatonin    ondansetron     Continuous Medications   Medication Dose Last Rate    heparin  0-4,500 Units/hr 1,500 Units/hr (03/05/25 0819)     Outpatient Medications:  Current Outpatient Medications   Medication Instructions    alendronate (FOSAMAX) 10 mg, oral, Daily    aspirin 81 mg, oral, Daily    buPROPion XL (WELLBUTRIN XL) 300 mg, oral, Daily, Do not crush, chew, or split. Total dose 450mg    buPROPion XL (WELLBUTRIN XL) 150 mg, oral, Every morning, Do not crush, chew, or split. Total dose 450mg    cholecalciferol (Vitamin D-3) 5,000 Units tablet 1 tablet, oral, Daily    DULoxetine (CYMBALTA) 30 mg, oral, 2 times daily, Do not crush or chew.    empagliflozin (JARDIANCE) 10 mg, oral, Daily RT    hydrOXYzine pamoate (VistariL) 25 mg capsule Take 1 to 2 capsules at bedtime.    ipratropium (Atrovent) 21 mcg (0.03 %) nasal spray 2 sprays, Each Nostril, Every 12 hours    melatonin 3 mg tablet 2 hours before desired bed time, for 3 weeks    metFORMIN XR (GLUCOPHAGE-XR) 500 mg, oral, Daily with evening meal, Do not crush, chew, or split.    metoprolol succinate XL (TOPROL-XL) 25 mg, oral, Every evening, Do not crush or chew.    paliperidone (Invega) 1.5 mg 24  hr tablet 1 tablet, oral, Nightly    primidone (MYSOLINE) 250 mg, oral, Nightly    sacubitriL-valsartan (Entresto) 24-26 mg tablet 1 tablet, oral, 2 times daily       Physical Exam  Cardiovascular:      Rate and Rhythm: Normal rate and regular rhythm.      Heart sounds: No murmur heard.     No friction rub. No gallop.   Pulmonary:      Effort: Pulmonary effort is normal.      Breath sounds: Normal breath sounds. No wheezing, rhonchi or rales.   Abdominal:      General: Bowel sounds are normal.      Palpations: Abdomen is soft.   Musculoskeletal:      Right lower leg: No edema.      Left lower leg: No edema.   Skin:     General: Skin is warm and dry.   Neurological:      Mental Status: He is alert and oriented to person, place, and time.   Psychiatric:         Mood and Affect: Mood normal.         Behavior: Behavior normal.            Assessment/Plan   Acute Pulmonary Emboli  EKG Changes  Acute on Chronic Kidney Disease  Nonischemic Cardiomyopathy (Ejection Fraction 35-40%)  Hypertensive Disorder    Impression and Plan:    3/5: As described above.  Patient presenting with acute onset shortness of breath, found to have multifocal bilateral pulmonary emboli on CT scan.  He was started on a heparin drip.  Patient currently resting comfortably in bed.  He is on room air with adequate pulse oximetry.  States he feels his shortness of breath is improving.  Blood pressures overall normotensive last recorded 123/82.  He continues on a heparin drip at this time.  Reviewed lab work this morning which revealed a sodium of 135, potassium 4.2, creatinine 1.23 and hemoglobin 14.3.  Lower extremity duplex ultrasound completed yesterday which was negative for deep vein thrombosis of the right lower extremity.  Regarding EKG changes, as described previously the patient did have T wave inversions in the inferior leads with ST segment changes, however, this patient's troponin's on admission were negative x2. Would like to repeat an EKG  tomorrow.  Echocardiogram has been ordered and will be completed this morning.  Pulmonology were consulted and appreciate their recommendations regarding anticoagulation, likely patient will be transitioned to Eliquis.  Will await echocardiogram results and follow with you.    Peripheral IV 03/04/25 20 G Proximal;Right Forearm (Active)   Site Assessment Clean;Dry;Intact 03/05/25 0800   Dressing Status Clean;Dry;Occlusive 03/05/25 0800   Number of days: 1       Code Status:  Full Code          Jessica Rowe, ANT-CNP

## 2025-03-06 ENCOUNTER — PHARMACY VISIT (OUTPATIENT)
Dept: PHARMACY | Facility: CLINIC | Age: 65
End: 2025-03-06
Payer: MEDICAID

## 2025-03-06 VITALS
HEIGHT: 75 IN | BODY MASS INDEX: 39.17 KG/M2 | HEART RATE: 68 BPM | OXYGEN SATURATION: 94 % | SYSTOLIC BLOOD PRESSURE: 110 MMHG | DIASTOLIC BLOOD PRESSURE: 68 MMHG | RESPIRATION RATE: 17 BRPM | TEMPERATURE: 97.2 F | WEIGHT: 315 LBS

## 2025-03-06 LAB
ANION GAP SERPL CALCULATED.3IONS-SCNC: 10 MMOL/L (ref 10–20)
APTT PPP: 46.4 SECONDS (ref 22–32.5)
BUN SERPL-MCNC: 24 MG/DL (ref 6–23)
CALCIUM SERPL-MCNC: 10.4 MG/DL (ref 8.6–10.3)
CHLORIDE SERPL-SCNC: 104 MMOL/L (ref 98–107)
CO2 SERPL-SCNC: 25 MMOL/L (ref 21–32)
CREAT SERPL-MCNC: 1.24 MG/DL (ref 0.5–1.3)
EGFRCR SERPLBLD CKD-EPI 2021: 65 ML/MIN/1.73M*2
ERYTHROCYTE [DISTWIDTH] IN BLOOD BY AUTOMATED COUNT: 13.9 % (ref 11.5–14.5)
GLUCOSE SERPL-MCNC: 92 MG/DL (ref 74–99)
HCT VFR BLD AUTO: 44.5 % (ref 41–52)
HGB BLD-MCNC: 14.4 G/DL (ref 13.5–17.5)
HOLD SPECIMEN: NORMAL
HOLD SPECIMEN: NORMAL
MCH RBC QN AUTO: 28.1 PG (ref 26–34)
MCHC RBC AUTO-ENTMCNC: 32.4 G/DL (ref 32–36)
MCV RBC AUTO: 87 FL (ref 80–100)
NRBC BLD-RTO: 0 /100 WBCS (ref 0–0)
PLATELET # BLD AUTO: 246 X10*3/UL (ref 150–450)
POTASSIUM SERPL-SCNC: 4.2 MMOL/L (ref 3.5–5.3)
RBC # BLD AUTO: 5.12 X10*6/UL (ref 4.5–5.9)
SODIUM SERPL-SCNC: 135 MMOL/L (ref 136–145)
WBC # BLD AUTO: 5.4 X10*3/UL (ref 4.4–11.3)

## 2025-03-06 PROCEDURE — 36415 COLL VENOUS BLD VENIPUNCTURE: CPT

## 2025-03-06 PROCEDURE — 80048 BASIC METABOLIC PNL TOTAL CA: CPT

## 2025-03-06 PROCEDURE — 2500000002 HC RX 250 W HCPCS SELF ADMINISTERED DRUGS (ALT 637 FOR MEDICARE OP, ALT 636 FOR OP/ED): Performed by: INTERNAL MEDICINE

## 2025-03-06 PROCEDURE — 85027 COMPLETE CBC AUTOMATED: CPT

## 2025-03-06 PROCEDURE — 85730 THROMBOPLASTIN TIME PARTIAL: CPT

## 2025-03-06 PROCEDURE — 99239 HOSP IP/OBS DSCHRG MGMT >30: CPT | Performed by: INTERNAL MEDICINE

## 2025-03-06 PROCEDURE — 2500000001 HC RX 250 WO HCPCS SELF ADMINISTERED DRUGS (ALT 637 FOR MEDICARE OP): Performed by: INTERNAL MEDICINE

## 2025-03-06 PROCEDURE — 99232 SBSQ HOSP IP/OBS MODERATE 35: CPT | Performed by: INTERNAL MEDICINE

## 2025-03-06 PROCEDURE — RXMED WILLOW AMBULATORY MEDICATION CHARGE

## 2025-03-06 PROCEDURE — 99232 SBSQ HOSP IP/OBS MODERATE 35: CPT

## 2025-03-06 RX ADMIN — Medication 5000 UNITS: at 08:52

## 2025-03-06 RX ADMIN — DULOXETINE HYDROCHLORIDE 30 MG: 30 CAPSULE, DELAYED RELEASE ORAL at 08:52

## 2025-03-06 RX ADMIN — APIXABAN 10 MG: 5 TABLET, FILM COATED ORAL at 10:26

## 2025-03-06 RX ADMIN — BUPROPION HYDROCHLORIDE 300 MG: 300 TABLET, EXTENDED RELEASE ORAL at 08:52

## 2025-03-06 RX ADMIN — RISPERIDONE 0.25 MG: 0.25 TABLET, FILM COATED ORAL at 08:52

## 2025-03-06 RX ADMIN — ASPIRIN 81 MG: 81 TABLET, COATED ORAL at 08:52

## 2025-03-06 ASSESSMENT — COGNITIVE AND FUNCTIONAL STATUS - GENERAL
MOBILITY SCORE: 24
DAILY ACTIVITIY SCORE: 24
MOBILITY SCORE: 24
DAILY ACTIVITIY SCORE: 24

## 2025-03-06 ASSESSMENT — PAIN SCALES - GENERAL
PAINLEVEL_OUTOF10: 0 - NO PAIN

## 2025-03-06 ASSESSMENT — PAIN - FUNCTIONAL ASSESSMENT
PAIN_FUNCTIONAL_ASSESSMENT: FLACC (FACE, LEGS, ACTIVITY, CRY, CONSOLABILITY)
PAIN_FUNCTIONAL_ASSESSMENT: FLACC (FACE, LEGS, ACTIVITY, CRY, CONSOLABILITY)

## 2025-03-06 ASSESSMENT — ACTIVITIES OF DAILY LIVING (ADL): LACK_OF_TRANSPORTATION: NO

## 2025-03-06 NOTE — NURSING NOTE
No changes from previous assessment.Resting with same infusion Heparin @ 15 ml/hr. Next PTT in the morning. Sinus Rhythm on heart monitor on 70's. Asymptomatic. Will continue to monitor.

## 2025-03-06 NOTE — DISCHARGE INSTRUCTIONS
Hold Entresto until seen by cardiologist, Dr. Boss  See Dr. Jurado, pulmonologist to follow-up on pulmonary embolism, lung nodule, Pulmonary function test.  Avoid any activities which may put you on the high risk of trauma or bleeding.  If you have severe bleeding, go to the nearest emergency department.

## 2025-03-06 NOTE — DISCHARGE SUMMARY
Discharge Diagnosis  Pulmonary embolism and infarction (Multi)  Acute renal failure  Lung nodule  History of smoking.    Issues Requiring Follow-Up  Coronary artery disease, history of non-ST elevation myocardial infarction  Pulmonary embolism and infarction  Lung nodule    Discharge Meds     Medication List      START taking these medications     * apixaban 5 mg (74 tabs) tablet; Commonly known as: Eliquis; Take 2   tablets (10 mg) by mouth 2 times a day for 7 days, then take 1 tablet (5   mg) by mouth 2 times a day.   * apixaban 5 mg tablet; Commonly known as: Eliquis; Take 2 tablets (10   mg) by mouth 2 times a day for 7 days, THEN 1 tablet (5 mg) 2 times a   day.; Start taking on: March 6, 2025  * This list has 2 medication(s) that are the same as other medications   prescribed for you. Read the directions carefully, and ask your doctor or   other care provider to review them with you.     CHANGE how you take these medications     buPROPion  mg 24 hr tablet; Commonly known as: Wellbutrin XL; Take   1 tablet (300 mg) by mouth once daily. Do not crush, chew, or split. Total   dose 450mg; What changed: Another medication with the same name was   removed. Continue taking this medication, and follow the directions you   see here.     CONTINUE taking these medications     alendronate 10 mg tablet; Commonly known as: Fosamax; Take 1 tablet (10   mg) by mouth once daily.   aspirin 81 mg EC tablet; Take 1 tablet (81 mg) by mouth once daily.   cholecalciferol 5,000 Units tablet; Commonly known as: Vitamin D-3   DULoxetine 30 mg DR capsule; Commonly known as: Cymbalta   empagliflozin 10 mg; Commonly known as: Jardiance; Take 1 tablet (10 mg)   by mouth once daily.   metFORMIN  mg 24 hr tablet; Commonly known as: Glucophage-XR; Take   1 tablet (500 mg) by mouth once daily in the evening. Take with meals. Do   not crush, chew, or split.   metoprolol succinate XL 25 mg 24 hr tablet; Commonly known as:   Toprol-XL;  Take 1 tablet (25 mg) by mouth once daily in the evening. Do   not crush or chew.   paliperidone 1.5 mg 24 hr tablet; Commonly known as: Invega   primidone 250 mg tablet; Commonly known as: Mysoline; Take 1 tablet (250   mg) by mouth once daily at bedtime.     STOP taking these medications     hydrOXYzine pamoate 25 mg capsule; Commonly known as: VistariL   ipratropium 21 mcg (0.03 %) nasal spray; Commonly known as: Atrovent   melatonin 3 mg tablet   sacubitriL-valsartan 24-26 mg tablet; Commonly known as: Entresto       Test Results Pending At Discharge  Pending Labs       No current pending labs.            Hospital Course   History:  Brandin Barfield is a 64 y.o. male presenting with acute onset of shortness of breath.  Patient denies previous history of venous thromboembolism.  He denies history of DVT or PE in the family.  He is not on any chronic anticoagulation.  Patient has past medical history of CHF, echocardiogram performed 1 year ago demonstrated ejection fraction of 35 to 40%.  Patient takes Entresto at home.  He follows with Dr. Boss.  Patient had cardiac catheterization 2023 and he did not show obstructive disease.  Patient admits to chronic shortness of breath going on over the last few months his.  He also admits to chronic right lower extremity edema.  Today, he was in the shower when he had an acute onset of severe shortness of breath.  He denies chest pain or dizziness.  Denies syncopal near syncopal episodes.  Patient presented to the emergency department where he was diagnosed with multifocal bilateral pulmonary embolism.  Also CAT scan demonstrated solitary pulmonary nodule in the right upper lobe increased in size since 2023.  Patient is stable hemodynamically, he does not require supplementary oxygen.  His case was discussed with PERC team and he was not considered to be a candidate for thrombolysis therapy.  Patient was started on heparin drip.    CT angiogram of the chest:  Multifocal  bilateral pulmonary embolism as described. No CT evidence  of right heart strain at this time.      Mild nonspecific bilateral hilar adenopathy, right greater than left,  and also mediastinal adenopathy.      Solitary pulmonary nodule: Smoothly marginated but lobulated  noncalcified central right upper lobe lung nodule measuring 14 x 11  mm today, compared to 11 x 10 mm on 11/06/2023. This could be a  slowly enlarging neoplasm. Recommend PET-CT scan in follow-up.      Mild nonspecific but grossly stable mediastinal and bilateral hilar  (right greater than left) adenopathy, perhaps reactive. This could  also be further evaluated with PET-CT scan.      Bilateral renal cysts.      Stable compression fractures with anterior wedging at L1 and at T4.    Right lower extremity Doppler ultrasound: No evidence for DVT    Echocardiogram:  1. The left ventricular systolic function is low normal, with a visually estimated ejection fraction of 50-55%.   2. Unable to determine right ventricular systolic function.   3. Technically very difficult study.   4. Right ventricle appeared to be of normal size, unable to assess function.    Twelve-lead EKG demonstrated ST depression in anterior leads.  Patient was consulted by cardiology.  Patient was pain-free.  Changes were thought to be related to acute pulmonary embolism.  Cardiology suggested holding Entresto since patient had tendency to hypotension.  Patient will need to see Dr. Bsos as outpatient and he will decide about restarting Entresto.  Echocardiogram did not demonstrate evidence of right heart failure.  Patient remained stable on room air.  He was consulted by pulmonologist who recommended outpatient workup for lung nodule, PET scan and then probably biopsy based on PET scan results.  Patient will also need outpatient pulmonary function test.  PET scan was ordered and 2 to 3 weeks.    Patient had some elevation of creatinine on admission to 1.4, improved to 1.23 on the  discharge.  Patient is being discharged home in stable condition.  He is stable on room air.  Patient will start loading dose of Eliquis here and our pharmacy will deliver medications prior to the discharge.        Had a discussion with patient about outpatient recommendations, tests and follow-ups.  He verbalizes understanding.  Total time spent with this patient today including exam, discussion, paperwork, 37 minutes.    Outpatient Follow-Up  Future Appointments   Date Time Provider Department Center   5/20/2025  3:20 PM Benjamin Dowell MD [id number] [facility]   8/6/2025  2:00 PM Walt Boss DO [id number] [facility]         Syl Hobbs MD

## 2025-03-06 NOTE — NURSING NOTE
Previous assessment the same. Asleep, Nothing new noted. Still on Heparin. Will continue to monitor.   [phone]

## 2025-03-06 NOTE — PROGRESS NOTES
"Brandin Barfield is a 64 y.o. male on day 2 of admission presenting with Pulmonary embolism and infarction (Multi).    Subjective   Patient resting comfortably in bed.  Denies chest pain, pressure or palpitations.       Objective     Physical Exam  HENT:      Head: Normocephalic and atraumatic.   Cardiovascular:      Rate and Rhythm: Normal rate and regular rhythm.      Heart sounds: No murmur heard.     No friction rub. No gallop.   Pulmonary:      Effort: Pulmonary effort is normal.      Breath sounds: Normal breath sounds. No wheezing, rhonchi or rales.      Comments: No conversational dyspnea noted.  Abdominal:      General: Bowel sounds are normal.      Palpations: Abdomen is soft.   Musculoskeletal:      Right lower leg: No edema.      Left lower leg: No edema.   Skin:     General: Skin is warm and dry.      Capillary Refill: Capillary refill takes less than 2 seconds.   Neurological:      Mental Status: He is alert and oriented to person, place, and time.   Psychiatric:         Mood and Affect: Mood normal.         Behavior: Behavior normal.         Last Recorded Vitals  Blood pressure 110/68, pulse 68, temperature 36.2 °C (97.2 °F), temperature source Temporal, resp. rate 17, height 1.905 m (6' 3\"), weight (!) 150 kg (331 lb 9.2 oz), SpO2 94%.  Intake/Output last 3 Shifts:  I/O last 3 completed shifts:  In: 528.7 (3.5 mL/kg) [P.O.:480; I.V.:48.7 (0.3 mL/kg)]  Out: 0 (0 mL/kg)   Weight: 150.4 kg     Relevant Results  Results for orders placed or performed during the hospital encounter of 03/04/25 (from the past 24 hours)   Transthoracic Echo (TTE) Complete   Result Value Ref Range    AV pk adarsh 1.09 m/s    LVOT diam 2.00 cm    AV mn grad 2 mmHg    MV E/A ratio 0.57     Tricuspid annular plane systolic excursion 1.7 cm    LV EF 53 %    RV free wall pk S' 10.00 cm/s    RVSP 34.6 mmHg    AV pk grad 5 mmHg    Aortic Valve Area by Continuity of VTI 1.88 cm2    Aortic Valve Area by Continuity of Peak Velocity 1.84 " cm2    LV A4C EF 64.9    aPTT   Result Value Ref Range    aPTT 47.8 (H) 22.0 - 32.5 seconds   Lavender Top   Result Value Ref Range    Extra Tube Hold for add-ons.    PST Top   Result Value Ref Range    Extra Tube Hold for add-ons.    aPTT   Result Value Ref Range    aPTT 46.4 (H) 22.0 - 32.5 seconds               Assessment/Plan   Assessment & Plan  Pulmonary embolism and infarction (Multi)    Acute Pulmonary Emboli  EKG Changes  Acute on Chronic Kidney Disease  Nonischemic Cardiomyopathy (Ejection Fraction 35-40%)  Hypertensive Disorder     Impression and Plan:     3/5: As described above.  Patient presenting with acute onset shortness of breath, found to have multifocal bilateral pulmonary emboli on CT scan.  He was started on a heparin drip.  Patient currently resting comfortably in bed.  He is on room air with adequate pulse oximetry.  States he feels his shortness of breath is improving.  Blood pressures overall normotensive last recorded 123/82.  He continues on a heparin drip at this time.  Reviewed lab work this morning which revealed a sodium of 135, potassium 4.2, creatinine 1.23 and hemoglobin 14.3.  Lower extremity duplex ultrasound completed yesterday which was negative for deep vein thrombosis of the right lower extremity.  Regarding EKG changes, as described previously the patient did have T wave inversions in the inferior leads with ST segment changes, however, this patient's troponin's on admission were negative x2. Would like to repeat an EKG tomorrow.  Echocardiogram has been ordered and will be completed this morning.  Pulmonology were consulted and appreciate their recommendations regarding anticoagulation, likely patient will be transitioned to Eliquis.  Will await echocardiogram results and follow with you.    3/6: As above. Blood pressures overall normotensive last recorded 110/68, he was intermittently hypotensive overnight with systolic blood pressures in the 90s.  Patient remains on room  air with pulse oximetry of 94% appears euvolemic on examination.  No lab work available for my review this morning.  Echocardiogram completed yesterday actually with an improvement in the patient's ejection fraction of 50 to 55%, unable to determine right ventricular systolic function due to being a technically difficult study right ventricle appeared to be of normal size.  Would recommend holding off on restarting Entresto at this time given his mild hypotension overnight, discussed with this with the patient and that we may be able to resume this when he follows up in the outpatient setting. I did repeat an EKG this morning which revealed improvement in the patient's ST segment changes in the inferior leads.  Ultimately will defer transition from IV heparin to oral anticoagulation to the pulmonology team.  Otherwise no further recommendations in the cardiac perspective.  Patient to follow-up with Dr. Boss in the outpatient setting in 2 to 3 weeks following discharge.        Jessica Rowe, APRN-CNP

## 2025-03-06 NOTE — PROGRESS NOTES
"   03/06/25 1024   Discharge Planning   Living Arrangements Friends  (resides with ex-wife)   Support Systems Friends/neighbors   Type of Residence Private residence   Number of Stairs to Enter Residence 3   Number of Stairs Within Residence 10   Do you have animals or pets at home? No   Home or Post Acute Services None   Expected Discharge Disposition Home   Does the patient need discharge transport arranged? No   Financial Resource Strain   How hard is it for you to pay for the very basics like food, housing, medical care, and heating? Not very   Housing Stability   In the last 12 months, was there a time when you were not able to pay the mortgage or rent on time? N   At any time in the past 12 months, were you homeless or living in a shelter (including now)? N   Transportation Needs   In the past 12 months, has lack of transportation kept you from medical appointments or from getting medications? no   In the past 12 months, has lack of transportation kept you from meetings, work, or from getting things needed for daily living? No   Stroke Family Assessment   Stroke Family Assessment Needed No     Met with patient at bedside.  An explanation of discharge planning was provided.  Patient resides in a multi level home with his ex- wife.  Patient is independent with all ADL's.  Patient is able to cook, clean shop and drive. Patient occasionally uses a cane to ambulate.  Patient admits to having some falls at home due to his knees \"giving out\".  No injuries reported.  Patient is not diabetic. Patient denies smoking and alcohol use. Patient does not require oxygen. PCP is Dr. Dowell, cardiologist is Dr. Boss. Patient receives his medications from [facility]. Patient does not have a POA and declines paperwork. Declines home going needs.   "

## 2025-03-06 NOTE — CARE PLAN
The patient's goals for the shift include      The clinical goals for the shift include monitor PTT    Over the shift, the patient did not make progress toward the following goals. Barriers to progression include . Recommendations to address these barriers include .

## 2025-03-06 NOTE — PROGRESS NOTES
03/06/25 [address] [place] [address] Disability Status   Are you deaf or do you have serious difficulty hearing? N   Are you blind or do you have serious difficulty seeing, even when wearing glasses? N   Because of a physical, mental, or emotional condition, do you have serious difficulty concentrating, remembering, or making decisions? (5 years old or older) N   Do you have serious difficulty walking or climbing stairs? N   Do you have serious difficulty dressing or bathing? N   Because of a physical, mental, or emotional condition, do you have serious difficulty doing errands alone such as visiting the doctor? N

## 2025-03-06 NOTE — PROGRESS NOTES
Department of Medicine  Division of Pulmonary, Critical Care, and Sleep Medicine  Progress Note    Subjective     Brandin Barfield is a 64 y.o. male on day 2 of admission presenting with Pulmonary embolism and infarction (Multi).    Pt was seen today resting comfortably in bed. Still on RA. No new complaints. Denies CP and SOB.     Objective     Vitals:      3/5/2025     8:02 AM 3/5/2025    12:06 PM 3/5/2025     4:33 PM 3/5/2025     8:45 PM 3/6/2025    12:00 AM 3/6/2025     3:54 AM 3/6/2025     8:32 AM   Vitals   Systolic 123 103 122 109 94 93 110   Diastolic 82 69 85 75 73 79 68   BP Location Left arm Left arm Left arm Left arm Left arm Left arm Left arm   Heart Rate 71 72 82 77 73 67 68   Temp 36.2 °C (97.2 °F) 36.4 °C (97.5 °F) 36.4 °C (97.5 °F) 36.5 °C (97.7 °F) 36.5 °C (97.7 °F) 36 °C (96.8 °F) 36.2 °C (97.2 °F)   Resp 16 17 18 16 18 18 17   Weight (lb)      331.57    BMI      41.44 kg/m2    BSA (m2)      2.82 m2         Oxygen Therapy:  SpO2: 94 %  Medical Gas Therapy: None (Room air)          Intake/Output::  I/O last 3 completed shifts:  In: 528.7 (3.5 mL/kg) [P.O.:480; I.V.:48.7 (0.3 mL/kg)]  Out: 0 (0 mL/kg)   Weight: 150.4 kg     Physical Exam:  Physical Exam  Constitutional:       General: He is not in acute distress.     Appearance: He is obese. He is not toxic-appearing.   HENT:      Head: Normocephalic and atraumatic.      Nose: Nose normal.      Mouth/Throat:      Pharynx: Oropharynx is clear.   Eyes:      Extraocular Movements: Extraocular movements intact.   Neck:      Comments: No visualized masses  Cardiovascular:      Rate and Rhythm: Normal rate and regular rhythm.      Heart sounds: No murmur heard.     No friction rub. No gallop.   Pulmonary:      Effort: No respiratory distress.      Breath sounds: Normal breath sounds. No wheezing, rhonchi or rales.   Abdominal:      General: There is no distension.      Palpations: Abdomen is soft.      Tenderness: There is no abdominal tenderness. There  is no guarding.   Musculoskeletal:         General: No tenderness.      Right lower leg: No edema.      Left lower leg: No edema.   Skin:     General: Skin is warm and dry.   Neurological:      Mental Status: He is alert and oriented to person, place, and time.   Psychiatric:         Mood and Affect: Mood normal.         Inpatient Medications:  apixaban, 10 mg, oral, BID   Followed by  [START ON 3/13/2025] apixaban, 5 mg, oral, BID  aspirin, 81 mg, oral, Daily  buPROPion XL, 300 mg, oral, Daily  cholecalciferol, 5,000 Units, oral, Daily  DULoxetine, 30 mg, oral, BID  metoprolol succinate XL, 25 mg, oral, q PM  perflutren protein A microsphere, 0.5 mL, intravenous, Once in imaging  primidone, 250 mg, oral, Nightly  risperiDONE, 0.25 mg, oral, Daily  sulfur hexafluoride microsphr, 2 mL, intravenous, Once in imaging         PRN medications: acetaminophen, alum-mag hydroxide-simeth, heparin (porcine), melatonin, ondansetron    Lab/Radiology/Diagnostic Review:  All labs and Imaging have been personally reviewed.     Assessment/Plan       Brandin Barfield is a 64 y.o. male hypertension, HFrEF (LVEF 35 to 40% as of 2024) NICM, CKD, past tobacco abuse (quit in 2015) who presented to [facility] ED with complaints of acute onset shortness of breath.  Pulmonary team was consulted for further workup and comanagement of pulmonary emboli.     Patient was seen and assessed by myself and his chart was reviewed for previous pulmonary visits, Labs and imaging.  Patient presented for acute shortness of breath.  Patient has a history of heart failure for which she has been followed for by cardiology.  Patient CT scan shows multiple pulmonary emboli.  PERT team recommended no thrombectomy or thrombolysis.  Patient has been stable in terms of laboratory studies and his hemodynamic status.  Patient was treated with a heparin GTT and transitioned to a DOAC.  Work up showed no RHS and No RLE DVT. LVEF noted to be 50-55%.     Patient has  follow up order for OP pulm for further workup for right upper lobe nodule as an outpatient with a PET scan and follow-up with pulmonary for further planning if biopsy is needed.     Assessment:  #Bilateral pulmonary emboli  #HFrEF  #Right upper lobe lung nodule  #REBECA on CKD     Recommendations:  -Further workup for other sources of malignancy defer; to primary team  -Continuous telemetry and SpO2 monitoring  -Continue DOAC  -PET scan and Pulm OP follow up ordered on Discharge.     I spent 35 minutes in the professional and overall care of this patient.    Pulmonary team will sign off from here.    Higinio Branham MD, MPH  Pulmonary and Critical Care Medicine     Please excuse any typographical or unwanted errors as voice recognition software was used to complete this note.

## 2025-03-06 NOTE — NURSING NOTE
Received patient ambulating along the hallway with continuous Heparin drip @ 17 ml/hr. Going on per protocol. Report done. Denies any pain or short of breath. Assessment as charted. Will monitor.

## 2025-03-07 ENCOUNTER — PATIENT OUTREACH (OUTPATIENT)
Dept: PRIMARY CARE | Facility: CLINIC | Age: 65
End: 2025-03-07
Payer: MEDICAID

## 2025-03-07 NOTE — PROGRESS NOTES
Discharge Facility:[facility]  Discharge Diagnosis:Pulmonary embolism and infarction (Multi)  Acute renal failure  Lung nodule  History of smoking.  Admission Date:3/4/25  Discharge Date: 3/6/25    PCP Appointment Date:3/18/25  Specialist Appointment Date: Cardiology and Pulmonology   Hospital Encounter and Summary Linked: Yes  See discharge assessment below for further details    ED to Hosp-Admission (Discharged) with Syl Hobbs MD (03/04/2025)   Two attempts were made to reach patient within two business days after discharge. Voicemail left with contact information for patient to call back with any non-emergent questions or concerns.    Yamilex Segovia LPN

## 2025-03-18 ENCOUNTER — APPOINTMENT (OUTPATIENT)
Dept: PRIMARY CARE | Facility: CLINIC | Age: 65
End: 2025-03-18
Payer: MEDICAID

## 2025-03-18 VITALS
TEMPERATURE: 97.3 F | SYSTOLIC BLOOD PRESSURE: 152 MMHG | HEART RATE: 80 BPM | WEIGHT: 314 LBS | BODY MASS INDEX: 39.25 KG/M2 | DIASTOLIC BLOOD PRESSURE: 97 MMHG

## 2025-03-18 DIAGNOSIS — I50.22 CHRONIC SYSTOLIC CONGESTIVE HEART FAILURE: ICD-10-CM

## 2025-03-18 DIAGNOSIS — Z09 HOSPITAL DISCHARGE FOLLOW-UP: Primary | ICD-10-CM

## 2025-03-18 DIAGNOSIS — I42.0 DILATED CARDIOMYOPATHY (MULTI): ICD-10-CM

## 2025-03-18 DIAGNOSIS — R97.20 ELEVATED PSA MEASUREMENT: ICD-10-CM

## 2025-03-18 PROCEDURE — 3077F SYST BP >= 140 MM HG: CPT | Performed by: INTERNAL MEDICINE

## 2025-03-18 PROCEDURE — 1036F TOBACCO NON-USER: CPT | Performed by: INTERNAL MEDICINE

## 2025-03-18 PROCEDURE — 99495 TRANSJ CARE MGMT MOD F2F 14D: CPT | Performed by: INTERNAL MEDICINE

## 2025-03-18 PROCEDURE — 3080F DIAST BP >= 90 MM HG: CPT | Performed by: INTERNAL MEDICINE

## 2025-03-18 RX ORDER — METOPROLOL SUCCINATE 25 MG/1
25 TABLET, EXTENDED RELEASE ORAL EVERY EVENING
Qty: 90 TABLET | Refills: 3 | Status: SHIPPED | OUTPATIENT
Start: 2025-03-18 | End: 2026-03-18

## 2025-03-18 ASSESSMENT — ENCOUNTER SYMPTOMS
FEVER: 0
POLYDIPSIA: 0
PALPITATIONS: 0
COUGH: 0
CHILLS: 0
SHORTNESS OF BREATH: 0

## 2025-03-18 ASSESSMENT — PAIN SCALES - GENERAL: PAINLEVEL_OUTOF10: 0-NO PAIN

## 2025-03-18 NOTE — PROGRESS NOTES
Subjective   Patient ID: Brandin Barfield is a 64 y.o. male who presents for Follow-up (Hospital [facility]).    64-year-old male presents today as a hospital follow-up for newly diagnosed pulmonary embolism.  He is now on Eliquis tolerating well with no bleeding or bruising complications.  His ejection fraction was evaluated at 55% improvement from the prior baseline of 35%.  He follows with cardiology they temporarily held his Entresto secondary to low mean arterial pressures although effort the patient's blood pressure is rising he is comfortable he has had a history of dilated cardiomyopathy and restarting Entresto is entirely appropriate necessary at this time I have advised to do so at low-dose, his previous dose.  He has no bruising or bleeding on the Eliquis.  He has been taking it as directed.  His breathing symptoms and fatigue been steadily improving since hospital discharge.  He has no complications since hospital discharge.  He has all of his available medications except for the Entresto which was stopped at the hospital and his metoprolol which his [facility] pharmacy is claiming they have not received a refill left.  I have corrected both issues at this time.  He has the appropriate follow-up is necessary at this time with scheduled follow-ups with cardiology and pulmonology.  He had a incidental finding of a pulmonary nodule, and growing from prior evaluation and a PET scan is currently scheduled as well.  All appropriate posthospital follow-ups are scheduled and taken care of at the time of our encounter.  He has follow-up with me scheduled for May which would be appropriate.         Review of Systems   Constitutional:  Negative for chills and fever.   Respiratory:  Negative for cough and shortness of breath.    Cardiovascular:  Negative for chest pain and palpitations.   Endocrine: Negative for polydipsia and polyuria.       Objective   BP (!) 152/97 (BP Location: Left arm, Patient Position: Sitting,  BP Cuff Size: Adult)   Pulse 80   Temp 36.3 °C (97.3 °F) (Temporal)   Wt 142 kg (314 lb)   BMI 39.25 kg/m²     Physical Exam  Constitutional:       Appearance: Normal appearance.   HENT:      Head: Normocephalic and atraumatic.   Eyes:      Extraocular Movements: Extraocular movements intact.      Pupils: Pupils are equal, round, and reactive to light.   Neck:      Thyroid: No thyroid mass or thyromegaly.      Vascular: No carotid bruit.   Cardiovascular:      Rate and Rhythm: Normal rate and regular rhythm.      Heart sounds: No murmur heard.     No friction rub. No gallop.   Pulmonary:      Effort: No respiratory distress.      Breath sounds: No wheezing, rhonchi or rales.   Musculoskeletal:      Cervical back: Neck supple.      Right lower leg: No edema.      Left lower leg: No edema.   Lymphadenopathy:      Cervical: No cervical adenopathy.   Neurological:      Mental Status: He is alert.         Assessment/Plan   Assessment & Plan  Hospital discharge follow-up         Elevated PSA measurement    Orders:    PSA, total and free; Future    Chronic systolic congestive heart failure    Orders:    sacubitriL-valsartan (Entresto) 24-26 mg tablet; Take 1 tablet by mouth 2 times a day.    Dilated cardiomyopathy (Multi)    Orders:    metoprolol succinate XL (Toprol-XL) 25 mg 24 hr tablet; Take 1 tablet (25 mg) by mouth once daily in the evening. Do not crush or chew.    sacubitriL-valsartan (Entresto) 24-26 mg tablet; Take 1 tablet by mouth 2 times a day.

## 2025-03-18 NOTE — ASSESSMENT & PLAN NOTE
Orders:    metoprolol succinate XL (Toprol-XL) 25 mg 24 hr tablet; Take 1 tablet (25 mg) by mouth once daily in the evening. Do not crush or chew.    sacubitriL-valsartan (Entresto) 24-26 mg tablet; Take 1 tablet by mouth 2 times a day.

## 2025-03-20 ENCOUNTER — APPOINTMENT (OUTPATIENT)
Facility: CLINIC | Age: 65
End: 2025-03-20
Payer: MEDICAID

## 2025-03-20 ENCOUNTER — HOSPITAL ENCOUNTER (OUTPATIENT)
Dept: RADIOLOGY | Facility: CLINIC | Age: 65
Discharge: HOME | End: 2025-03-20
Payer: MEDICAID

## 2025-03-20 DIAGNOSIS — R91.1 LUNG NODULE: ICD-10-CM

## 2025-03-20 PROCEDURE — A9552 F18 FDG: HCPCS | Performed by: INTERNAL MEDICINE

## 2025-03-20 PROCEDURE — 78815 PET IMAGE W/CT SKULL-THIGH: CPT

## 2025-03-20 PROCEDURE — 3430000001 HC RX 343 DIAGNOSTIC RADIOPHARMACEUTICALS: Performed by: INTERNAL MEDICINE

## 2025-03-20 RX ORDER — FLUDEOXYGLUCOSE F 18 200 MCI/ML
11.6 INJECTION, SOLUTION INTRAVENOUS
Status: COMPLETED | OUTPATIENT
Start: 2025-03-20 | End: 2025-03-20

## 2025-03-20 RX ADMIN — FLUDEOXYGLUCOSE F 18 11.6 MILLICURIE: 200 INJECTION, SOLUTION INTRAVENOUS at 09:29

## 2025-03-21 ENCOUNTER — OFFICE VISIT (OUTPATIENT)
Facility: CLINIC | Age: 65
End: 2025-03-21
Payer: MEDICAID

## 2025-03-21 ENCOUNTER — PATIENT OUTREACH (OUTPATIENT)
Dept: PRIMARY CARE | Facility: CLINIC | Age: 65
End: 2025-03-21

## 2025-03-21 VITALS
HEART RATE: 80 BPM | SYSTOLIC BLOOD PRESSURE: 110 MMHG | OXYGEN SATURATION: 94 % | BODY MASS INDEX: 39.75 KG/M2 | WEIGHT: 315 LBS | DIASTOLIC BLOOD PRESSURE: 70 MMHG

## 2025-03-21 DIAGNOSIS — I26.99 PULMONARY EMBOLISM WITHOUT ACUTE COR PULMONALE, UNSPECIFIED CHRONICITY, UNSPECIFIED PULMONARY EMBOLISM TYPE (MULTI): Primary | ICD-10-CM

## 2025-03-21 DIAGNOSIS — R91.1 PULMONARY NODULE: ICD-10-CM

## 2025-03-21 PROCEDURE — 99214 OFFICE O/P EST MOD 30 MIN: CPT | Performed by: INTERNAL MEDICINE

## 2025-03-21 PROCEDURE — 1036F TOBACCO NON-USER: CPT | Performed by: INTERNAL MEDICINE

## 2025-03-21 PROCEDURE — 3078F DIAST BP <80 MM HG: CPT | Performed by: INTERNAL MEDICINE

## 2025-03-21 PROCEDURE — 3074F SYST BP LT 130 MM HG: CPT | Performed by: INTERNAL MEDICINE

## 2025-03-21 ASSESSMENT — ENCOUNTER SYMPTOMS
OCCASIONAL FEELINGS OF UNSTEADINESS: 1
LOSS OF SENSATION IN FEET: 1
DEPRESSION: 1

## 2025-03-21 ASSESSMENT — LIFESTYLE VARIABLES: TOTAL SCORE: 0

## 2025-03-21 ASSESSMENT — PATIENT HEALTH QUESTIONNAIRE - PHQ9
SUM OF ALL RESPONSES TO PHQ9 QUESTIONS 1 AND 2: 0
1. LITTLE INTEREST OR PLEASURE IN DOING THINGS: NOT AT ALL
2. FEELING DOWN, DEPRESSED OR HOPELESS: NOT AT ALL

## 2025-03-21 ASSESSMENT — PAIN SCALES - GENERAL: PAINLEVEL_OUTOF10: 4

## 2025-03-21 NOTE — PROGRESS NOTES
Bronchoscopy Scheduling Request    Pre-bronchoscopy visit: Not needed , Inpatient 3/6/2025  Please schedule procedure: Next available    Cytology on-site:  Yes  Location:  Either location  Performing physician:  Advanced diagnostic bronchoscopist  Referring physician:  Higinio Branham MD, Benjamin Dowell MD  Indication:  Mediastinal lymphadenopathy  Sedation / Anesthesia:  GA  Procedure:  Staging EBUS  Time:  Tier 2  Fluorscopy:   No  Imaging needed:  None  Labs:  None  Meds:  Eliquis  Special Considerations:  None  Reviewed by:  Sandeep Ledezma MD  RUL nodule, PET avid lymphadenopathy. Recent PE 3/4, prior HFrEF with recovered EF seen by cardiology 3/2025.

## 2025-03-21 NOTE — ASSESSMENT & PLAN NOTE
In the setting of shortness of breath and hypoxemia he had a CT scan of his chest done March 2025 showing pulmonary embolism and he is now on Eliquis anticoagulation.  He also had a pulmonary nodule noted on that CT scan and had a PET scan on March 30, 2025 that PET scan showed hypermetabolic right upper lobe nodule as well as hypermetabolic right hilar and mediastinal lymphadenopathy as well as periportal lymphadenopathy that would be consistent with malignancy and metastatic spread.    He will need referral to pulmonary medicine for tissue biopsy and oncology referral.

## 2025-03-21 NOTE — ASSESSMENT & PLAN NOTE
After treatment with guideline directed medical therapy with metoprolol and Entresto and Jardiance, his cardiomyopathy improved and his echocardiogram March 2025 now shows left ventricular ejection fraction 55%, with RV size normal but in a technically difficult study the RV function was not easily estimated.    He will be continued on guideline directed medical therapy.

## 2025-03-21 NOTE — PROGRESS NOTES
Subjective      Chief Complaint   Patient presents with    Hospital Follow-up        Here for follow-up of his previous nonischemic cardiomyopathy.  He is angina free with no signs of heart failure.  He just had hospitalization in March 2025 for pulmonary embolism.      Previous: Based on his previous cardiac catheterization and December 2022 showing mild nonobstructive coronary artery disease, his abnormal nuclear stress test suggests that he may have had an artifact not a true myocardial infarction in his left ventricular apex.        He had a nuclear stress test Lexiscan protocol showed evidence dyskinetic apex with a fixed inferoapical perfusion defect and left ventricular ejection fraction was estimated at 35% which was probably a slight underestimation compared with his echocardiogram.  His echocardiogram showed moderate left ventricular systolic dysfunction with left ventricular ejection fraction 40 to 45%.              Review of Systems   All other systems reviewed and are negative.       Objective   Physical Exam     Lab Review:   Not applicable    Cardiomyopathy (Multi)  After treatment with guideline directed medical therapy with metoprolol and Entresto and Jardiance, his cardiomyopathy improved and his echocardiogram March 2025 now shows left ventricular ejection fraction 55%, with RV size normal but in a technically difficult study the RV function was not easily estimated.    He will be continued on guideline directed medical therapy.    HTN (hypertension), benign  Stable on his present medical therapy.    Pulmonary embolism without acute cor pulmonale  In the setting of shortness of breath and hypoxemia he had a CT scan of his chest done March 2025 showing pulmonary embolism and he is now on Eliquis anticoagulation.  He also had a pulmonary nodule noted on that CT scan and had a PET scan on March 30, 2025 that PET scan showed hypermetabolic right upper lobe nodule as well as hypermetabolic right hilar  and mediastinal lymphadenopathy as well as periportal lymphadenopathy that would be consistent with malignancy and metastatic spread.    He will need referral to pulmonary medicine for tissue biopsy and oncology referral.    Pulmonary nodule  See recommendations for abnormal pulmonary nodule

## 2025-03-27 DIAGNOSIS — R59.0 MEDIASTINAL LYMPHADENOPATHY: ICD-10-CM

## 2025-04-03 ENCOUNTER — APPOINTMENT (OUTPATIENT)
Dept: PULMONOLOGY | Facility: CLINIC | Age: 65
End: 2025-04-03
Payer: MEDICAID

## 2025-04-03 DIAGNOSIS — Z01.811 PREOP PULMONARY/RESPIRATORY EXAM: Primary | ICD-10-CM

## 2025-04-03 PROCEDURE — 99213 OFFICE O/P EST LOW 20 MIN: CPT | Performed by: INTERNAL MEDICINE

## 2025-04-03 PROCEDURE — 1036F TOBACCO NON-USER: CPT | Performed by: INTERNAL MEDICINE

## 2025-04-03 NOTE — PROGRESS NOTES
Subjective   Patient ID: Brandin Barfield is a 64 y.o. male who presents for Pre Bronchoscopy.  HPI  Was contacted today by telephone at his home.  He has a history of some mediastinal lymphadenopathy and has a bronchoscopy scheduled for 4/7/25.  His procedure is going to be done at [facility] and the arrival time is scheduled at 8:30 AM.  He does have a history pulmonary emboli and he will hold his Eliquis on 4/4/25.  The patient denies having a current sore throat, hemoptysis or fever.  He is not having any chest pains at this time.  He said no recent myocardial infarction and no history of heart failure.  He reports having a community-acquired pneumonia in 2021 and also COVID pneumonia that same year.  Reports 3 pack/day history of smoking from 19 .  He denies any alcohol usage since he was 19 years old.  All the patient's questions to his satisfaction.  Review of Systems  All other review of systems were noncontributory.  Objective   Physical Exam  Not done.  Assessment/Plan        1.  Preop pulmonary/respiratory exam.        This note was transcribed using the Dragon Dictation system.  There may be grammatical, punctuation, or verbiage errors that occur with voice recognition programs.  Bravo Berumen,  04/03/25 11:00 AM

## 2025-04-04 ENCOUNTER — PATIENT OUTREACH (OUTPATIENT)
Dept: PRIMARY CARE | Facility: CLINIC | Age: 65
End: 2025-04-04
Payer: MEDICAID

## 2025-04-06 ENCOUNTER — ANESTHESIA EVENT (OUTPATIENT)
Dept: GASTROENTEROLOGY | Facility: HOSPITAL | Age: 65
End: 2025-04-06
Payer: MEDICAID

## 2025-04-07 ENCOUNTER — HOSPITAL ENCOUNTER (OUTPATIENT)
Dept: GASTROENTEROLOGY | Facility: HOSPITAL | Age: 65
Discharge: HOME | End: 2025-04-07
Payer: MEDICAID

## 2025-04-07 ENCOUNTER — ANESTHESIA (OUTPATIENT)
Dept: GASTROENTEROLOGY | Facility: HOSPITAL | Age: 65
End: 2025-04-07
Payer: MEDICAID

## 2025-04-07 VITALS
HEIGHT: 75 IN | HEART RATE: 87 BPM | TEMPERATURE: 97.5 F | OXYGEN SATURATION: 93 % | SYSTOLIC BLOOD PRESSURE: 118 MMHG | RESPIRATION RATE: 16 BRPM | DIASTOLIC BLOOD PRESSURE: 75 MMHG | BODY MASS INDEX: 39.03 KG/M2 | WEIGHT: 313.94 LBS

## 2025-04-07 DIAGNOSIS — R59.0 MEDIASTINAL LYMPHADENOPATHY: ICD-10-CM

## 2025-04-07 PROCEDURE — 2500000005 HC RX 250 GENERAL PHARMACY W/O HCPCS: Performed by: ANESTHESIOLOGIST ASSISTANT

## 2025-04-07 PROCEDURE — A31653 PR BRNCHSC EBUS GUIDED SAMPL 3/> NODE STATION/STRUX: Performed by: ANESTHESIOLOGY

## 2025-04-07 PROCEDURE — 31653 BRONCH EBUS SAMPLNG 3/> NODE: CPT | Performed by: STUDENT IN AN ORGANIZED HEALTH CARE EDUCATION/TRAINING PROGRAM

## 2025-04-07 PROCEDURE — 87116 MYCOBACTERIA CULTURE: CPT | Mod: AHULAB | Performed by: STUDENT IN AN ORGANIZED HEALTH CARE EDUCATION/TRAINING PROGRAM

## 2025-04-07 PROCEDURE — 3700000001 HC GENERAL ANESTHESIA TIME - INITIAL BASE CHARGE

## 2025-04-07 PROCEDURE — 2500000005 HC RX 250 GENERAL PHARMACY W/O HCPCS: Performed by: ANESTHESIOLOGY

## 2025-04-07 PROCEDURE — 7100000002 HC RECOVERY ROOM TIME - EACH INCREMENTAL 1 MINUTE

## 2025-04-07 PROCEDURE — 2720000007 HC OR 272 NO HCPCS

## 2025-04-07 PROCEDURE — 7100000010 HC PHASE TWO TIME - EACH INCREMENTAL 1 MINUTE

## 2025-04-07 PROCEDURE — 7100000009 HC PHASE TWO TIME - INITIAL BASE CHARGE

## 2025-04-07 PROCEDURE — 3700000002 HC GENERAL ANESTHESIA TIME - EACH INCREMENTAL 1 MINUTE

## 2025-04-07 PROCEDURE — 7100000001 HC RECOVERY ROOM TIME - INITIAL BASE CHARGE

## 2025-04-07 PROCEDURE — 88172 CYTP DX EVAL FNA 1ST EA SITE: CPT | Mod: TC | Performed by: STUDENT IN AN ORGANIZED HEALTH CARE EDUCATION/TRAINING PROGRAM

## 2025-04-07 PROCEDURE — 87102 FUNGUS ISOLATION CULTURE: CPT | Mod: AHULAB | Performed by: STUDENT IN AN ORGANIZED HEALTH CARE EDUCATION/TRAINING PROGRAM

## 2025-04-07 PROCEDURE — A31653 PR BRNCHSC EBUS GUIDED SAMPL 3/> NODE STATION/STRUX: Performed by: ANESTHESIOLOGIST ASSISTANT

## 2025-04-07 PROCEDURE — 2500000004 HC RX 250 GENERAL PHARMACY W/ HCPCS (ALT 636 FOR OP/ED): Performed by: ANESTHESIOLOGIST ASSISTANT

## 2025-04-07 PROCEDURE — 87070 CULTURE OTHR SPECIMN AEROBIC: CPT | Mod: AHULAB | Performed by: STUDENT IN AN ORGANIZED HEALTH CARE EDUCATION/TRAINING PROGRAM

## 2025-04-07 RX ORDER — SODIUM CHLORIDE, SODIUM LACTATE, POTASSIUM CHLORIDE, CALCIUM CHLORIDE 600; 310; 30; 20 MG/100ML; MG/100ML; MG/100ML; MG/100ML
100 INJECTION, SOLUTION INTRAVENOUS CONTINUOUS
Status: ACTIVE | OUTPATIENT
Start: 2025-04-07 | End: 2025-04-07

## 2025-04-07 RX ORDER — FENTANYL CITRATE 50 UG/ML
INJECTION, SOLUTION INTRAMUSCULAR; INTRAVENOUS AS NEEDED
Status: DISCONTINUED | OUTPATIENT
Start: 2025-04-07 | End: 2025-04-07

## 2025-04-07 RX ORDER — ROCURONIUM BROMIDE 10 MG/ML
INJECTION, SOLUTION INTRAVENOUS AS NEEDED
Status: DISCONTINUED | OUTPATIENT
Start: 2025-04-07 | End: 2025-04-07

## 2025-04-07 RX ORDER — LIDOCAINE HYDROCHLORIDE 20 MG/ML
INJECTION, SOLUTION EPIDURAL; INFILTRATION; INTRACAUDAL; PERINEURAL AS NEEDED
Status: DISCONTINUED | OUTPATIENT
Start: 2025-04-07 | End: 2025-04-07

## 2025-04-07 RX ORDER — ESMOLOL HYDROCHLORIDE 10 MG/ML
INJECTION INTRAVENOUS AS NEEDED
Status: DISCONTINUED | OUTPATIENT
Start: 2025-04-07 | End: 2025-04-07

## 2025-04-07 RX ORDER — PROPOFOL 10 MG/ML
INJECTION, EMULSION INTRAVENOUS AS NEEDED
Status: DISCONTINUED | OUTPATIENT
Start: 2025-04-07 | End: 2025-04-07

## 2025-04-07 RX ORDER — ONDANSETRON HYDROCHLORIDE 2 MG/ML
INJECTION, SOLUTION INTRAVENOUS AS NEEDED
Status: DISCONTINUED | OUTPATIENT
Start: 2025-04-07 | End: 2025-04-07

## 2025-04-07 RX ORDER — METOCLOPRAMIDE HYDROCHLORIDE 5 MG/ML
10 INJECTION INTRAMUSCULAR; INTRAVENOUS ONCE AS NEEDED
Status: DISCONTINUED | OUTPATIENT
Start: 2025-04-07 | End: 2025-04-08 | Stop reason: HOSPADM

## 2025-04-07 RX ORDER — SODIUM CHLORIDE, SODIUM LACTATE, POTASSIUM CHLORIDE, CALCIUM CHLORIDE 600; 310; 30; 20 MG/100ML; MG/100ML; MG/100ML; MG/100ML
INJECTION, SOLUTION INTRAVENOUS CONTINUOUS PRN
Status: DISCONTINUED | OUTPATIENT
Start: 2025-04-07 | End: 2025-04-07

## 2025-04-07 RX ORDER — ONDANSETRON HYDROCHLORIDE 2 MG/ML
4 INJECTION, SOLUTION INTRAVENOUS ONCE AS NEEDED
Status: DISCONTINUED | OUTPATIENT
Start: 2025-04-07 | End: 2025-04-08 | Stop reason: HOSPADM

## 2025-04-07 RX ORDER — LIDOCAINE HYDROCHLORIDE 10 MG/ML
0.1 INJECTION, SOLUTION EPIDURAL; INFILTRATION; INTRACAUDAL; PERINEURAL ONCE
Status: DISCONTINUED | OUTPATIENT
Start: 2025-04-07 | End: 2025-04-08 | Stop reason: HOSPADM

## 2025-04-07 RX ORDER — LIDOCAINE HYDROCHLORIDE 40 MG/ML
SOLUTION TOPICAL AS NEEDED
Status: DISCONTINUED | OUTPATIENT
Start: 2025-04-07 | End: 2025-04-07

## 2025-04-07 RX ORDER — PHENYLEPHRINE HCL IN 0.9% NACL 1 MG/10 ML
SYRINGE (ML) INTRAVENOUS AS NEEDED
Status: DISCONTINUED | OUTPATIENT
Start: 2025-04-07 | End: 2025-04-07

## 2025-04-07 RX ORDER — MIDAZOLAM HYDROCHLORIDE 1 MG/ML
INJECTION INTRAMUSCULAR; INTRAVENOUS AS NEEDED
Status: DISCONTINUED | OUTPATIENT
Start: 2025-04-07 | End: 2025-04-07

## 2025-04-07 RX ADMIN — Medication 50 MCG: at 09:49

## 2025-04-07 RX ADMIN — SODIUM CHLORIDE, POTASSIUM CHLORIDE, SODIUM LACTATE AND CALCIUM CHLORIDE: 600; 310; 30; 20 INJECTION, SOLUTION INTRAVENOUS at 09:24

## 2025-04-07 RX ADMIN — ROCURONIUM BROMIDE 50 MG: 10 INJECTION INTRAVENOUS at 09:27

## 2025-04-07 RX ADMIN — SUGAMMADEX 200 MG: 100 INJECTION, SOLUTION INTRAVENOUS at 10:16

## 2025-04-07 RX ADMIN — PROPOFOL 40 MCG/KG/MIN: 10 INJECTION, EMULSION INTRAVENOUS at 09:30

## 2025-04-07 RX ADMIN — Medication 300 MCG: at 09:39

## 2025-04-07 RX ADMIN — LIDOCAINE HYDROCHLORIDE 4 ML: 40 SOLUTION TOPICAL at 09:30

## 2025-04-07 RX ADMIN — GLYCOPYRROLATE 0.2 MG: 0.2 INJECTION, SOLUTION INTRAMUSCULAR; INTRAVENOUS at 09:24

## 2025-04-07 RX ADMIN — WHITE PETROLATUM 57.7 %-MINERAL OIL 31.9 % EYE OINTMENT 2 APPLICATION: at 09:27

## 2025-04-07 RX ADMIN — Medication 100 MCG: at 09:37

## 2025-04-07 RX ADMIN — FENTANYL CITRATE 50 MCG: 50 INJECTION, SOLUTION INTRAMUSCULAR; INTRAVENOUS at 09:27

## 2025-04-07 RX ADMIN — Medication 6 L/MIN: at 10:45

## 2025-04-07 RX ADMIN — MIDAZOLAM HYDROCHLORIDE 2 MG: 1 INJECTION, SOLUTION INTRAMUSCULAR; INTRAVENOUS at 09:24

## 2025-04-07 RX ADMIN — ESMOLOL HYDROCHLORIDE 50 MG: 10 INJECTION INTRAVENOUS at 10:17

## 2025-04-07 RX ADMIN — PROPOFOL 180 MG: 10 INJECTION, EMULSION INTRAVENOUS at 09:27

## 2025-04-07 RX ADMIN — DEXAMETHASONE SODIUM PHOSPHATE 8 MG: 4 INJECTION, SOLUTION INTRAMUSCULAR; INTRAVENOUS at 09:27

## 2025-04-07 RX ADMIN — Medication 3 L/MIN: at 11:15

## 2025-04-07 RX ADMIN — Medication 100 MCG: at 09:55

## 2025-04-07 RX ADMIN — ONDANSETRON 4 MG: 2 INJECTION, SOLUTION INTRAMUSCULAR; INTRAVENOUS at 10:08

## 2025-04-07 RX ADMIN — Medication 10 L/MIN: at 10:27

## 2025-04-07 RX ADMIN — Medication 150 MCG: at 10:01

## 2025-04-07 RX ADMIN — LIDOCAINE HYDROCHLORIDE 100 MG: 20 INJECTION, SOLUTION EPIDURAL; INFILTRATION; INTRACAUDAL; PERINEURAL at 09:27

## 2025-04-07 SDOH — HEALTH STABILITY: MENTAL HEALTH: CURRENT SMOKER: 0

## 2025-04-07 ASSESSMENT — PAIN SCALES - GENERAL
PAINLEVEL_OUTOF10: 0 - NO PAIN
PAINLEVEL_OUTOF10: 2
PAINLEVEL_OUTOF10: 0 - NO PAIN
PAINLEVEL_OUTOF10: 0 - NO PAIN

## 2025-04-07 ASSESSMENT — PAIN - FUNCTIONAL ASSESSMENT
PAIN_FUNCTIONAL_ASSESSMENT: 0-10
PAIN_FUNCTIONAL_ASSESSMENT: UNABLE TO SELF-REPORT
PAIN_FUNCTIONAL_ASSESSMENT: 0-10
PAIN_FUNCTIONAL_ASSESSMENT: 0-10
PAIN_FUNCTIONAL_ASSESSMENT: UNABLE TO SELF-REPORT
PAIN_FUNCTIONAL_ASSESSMENT: 0-10

## 2025-04-07 ASSESSMENT — COLUMBIA-SUICIDE SEVERITY RATING SCALE - C-SSRS
6. HAVE YOU EVER DONE ANYTHING, STARTED TO DO ANYTHING, OR PREPARED TO DO ANYTHING TO END YOUR LIFE?: NO
1. IN THE PAST MONTH, HAVE YOU WISHED YOU WERE DEAD OR WISHED YOU COULD GO TO SLEEP AND NOT WAKE UP?: NO
2. HAVE YOU ACTUALLY HAD ANY THOUGHTS OF KILLING YOURSELF?: NO
2. HAVE YOU ACTUALLY HAD ANY THOUGHTS OF KILLING YOURSELF?: NO
1. IN THE PAST MONTH, HAVE YOU WISHED YOU WERE DEAD OR WISHED YOU COULD GO TO SLEEP AND NOT WAKE UP?: NO
6. HAVE YOU EVER DONE ANYTHING, STARTED TO DO ANYTHING, OR PREPARED TO DO ANYTHING TO END YOUR LIFE?: NO

## 2025-04-07 NOTE — DISCHARGE INSTRUCTIONS
The anesthetics, sedatives or narcotics which were given to you today will be acting in your body for the next 24 hours, so you might feel a little sleepy or groggy. This feeling should slowly wear off.   Carefully read and follow the instructions below:   You received sedation today.   Do not drive or operate machinery or power tools of any kind.   No alcoholic beverages today, not even beer or wine.   No over the counter medications that contain alcohol or may cause drowsiness.   Do not make important decisions or sign legal documents.     Do not use Aspirin containing products or non-steroidal medications for the next 24 hours.  (Examples of these types of medications include: Advil, Aleve, Ecotrin, Ibuprofen, Motrin or Naprosyn.  This list is not all-inclusive.  Check with your physician or pharmacist before resuming these medications.)  Tylenol, cough medicine, cough drops or throat lozenges may be used when you are allowed to resume eating and drinking.     Call your physician if any of these symptoms occur:   High fever over 101 degrees or chills (a low grade fever is common for 24 hours)   Rash or hives   Persistent nausea or vomiting   Inability to urinate within 8 hours after the procedure  Go directly to the emergency room if you notice any of the following:   Shortness of breath   Chest pain  Coughing up large amounts of bright red blood greater than a teaspoonful of blood clots (about a teaspoonful for the next 24-48 hours is normal, especially if you had a biopsy)  Resume all normal medications unless directed otherwise by your doctor.     Your doctor recommends these additional instructions:    Follow up with your referring physician as previously scheduled.    If you experience any problems or have any questions following discharge, please call:   Before 5 pm: [phone]   After 5pm and on weekends: [phone] / [phone] and ask for the Pulmonary Fellow on-call (Pager Number: [phone])

## 2025-04-07 NOTE — ANESTHESIA POSTPROCEDURE EVALUATION
Patient: Brandin Barfield    Procedure Summary       Date: 04/07/25 Room / Location: [facility]    Anesthesia Start: 0924 Anesthesia Stop: 1034    Procedure: BRONCHOSCOPY Diagnosis: Mediastinal lymphadenopathy    Scheduled Providers: Sandeep Ledezma MD; Lillian Espinoza MD; MALGORZATA Hawkins Responsible Provider: Lillian Espinoza MD    Anesthesia Type: general ASA Status: 4            Anesthesia Type: general    Vitals Value Taken Time   /75 04/07/25 1215   Temp 36.5 °C (97.7 °F) 04/07/25 1215   Pulse 77 04/07/25 1215   Resp 16 04/07/25 1215   SpO2 94 % 04/07/25 1215       Anesthesia Post Evaluation    Patient participation: complete - patient participated  Level of consciousness: awake and alert  Pain management: adequate  Airway patency: patent  Cardiovascular status: acceptable  Respiratory status: acceptable  Hydration status: acceptable  Postoperative Nausea and Vomiting: none    No notable events documented.

## 2025-04-07 NOTE — LETTER
DearBrandin      3/27/2025                                            INFORMATION FOR YOUR PROCEDURE     INSTRUCTIONS MUST BE FOLLOWED OR YOU RUN THE RISK OF YOUR CASE BEING CANCELED     Information is attached to this e-mail for your upcoming procedure. (Look for the paperclip in your email to access this information)  Lab requisitions are also included as well as procedural instructions.    You are scheduled for your Bronchoscopy on   4/7/25,   with Dr. Sandeep Ledezma    Arrival is at  8:30  AM,  for Check In prior to your actual procedure time. This allows us to prepare you for your procedure.  Location:   [place] [address]    Check In:  2nd Floor OR Waiting Room  Approach the  for Check In    Patient information is right there if assistance is needed.    NPO (No food or drink) after midnight the night before your procedure. This includes coffee, water, and soda, hard candy, gum or mints.  If taking your morning medications, a small sip of water is allowed to get the medication down.    For your safety, you must have a responsible, adult  accompany you to your procedure.  You will not be permitted to drive yourself home if you have received any type of anesthesia or sedation.    Automated calls about your upcoming scheduled appointments can be quite confusing for patients.  The times may vary depending on what you have scheduled for procedure day. Follow the time/s I have given you  so there is no confusion.    Blood work will need to be done prior to your procedure, preferably at a  Facility.  There are no restrictions for testing. I have attached a requisition for you.    Dr. Bravo Berumen, will call you on 4/3/25, @ 10:00 AM    This is a phone visit to go over your medical history prior to your procedure, and is a necessary part of your workup.  The patient must be present at this phone visit.    Please reach out  to me with any questions you may have [facility] @ [phone] or   Fernando @ [phone]    In case of an emergency  on the day of your procedure,  and you need to cancel, (Weather or other), please call the [facility] @ [phone].    Have a nice day!    Zenobia Valle    Bronchoscopy   Interventional Pulmonology    MD Eulogio Manzano MD Sameer Avasarala, MD Catalina Teba, MD Andrew Dunatchik, MD Sruti Brahmandam, MD        [facility]  Pulmonary, Critical Care and Sleep Medicine  [address] [place] [address]  P -[phone]   [phone]  [email or web address]

## 2025-04-07 NOTE — ANESTHESIA PROCEDURE NOTES
Airway  Date/Time: 4/7/2025 9:30 AM  Urgency: elective    Airway not difficult    Staffing  Performed: MALGORZATA   Authorized by: Lillian Espinoza MD    Performed by: MALGORZATA Hawkins  Patient location during procedure: OR    Indications and Patient Condition  Indications for airway management: anesthesia and airway protection  Spontaneous Ventilation: absent  Sedation level: deep  Preoxygenated: yes  Patient position: sniffing  MILS maintained throughout  Mask difficulty assessment: 2 - vent by mask + OA or adjuvant +/- NMBA  Planned trial extubation    Final Airway Details  Final airway type: endotracheal airway      Successful airway: ETT  Cuffed: yes   Successful intubation technique: video laryngoscopy (GlideScope)  Facilitating devices/methods: intubating stylet  Endotracheal tube insertion site: oral  Blade: Roberta  Blade size: #3  ETT size (mm): 8.5  Cormack-Lehane Classification: grade I - full view of glottis  Placement verified by: chest auscultation and capnometry   Measured from: lips  ETT to lips (cm): 22  Number of attempts at approach: 1  Number of other approaches attempted: 0    Additional Comments  Easy grade I view. Teeth and lips in pre-op condition. LTA kit used. Soft bite block placed.

## 2025-04-07 NOTE — ANESTHESIA PREPROCEDURE EVALUATION
Patient: Brandin Barfield    Procedure Information       Anesthesia Start Date/Time: 04/07/25 0924    Scheduled providers: Sandeep Ledezma MD; Lillian Espinoza MD; MALGORZATA Hawkins    Procedure: BRONCHOSCOPY    Location: [facility]          Vitals:    04/07/25 0908   BP: 126/84   Pulse: 80   Resp: 18   Temp: 36.4 °C (97.5 °F)   SpO2: 94%       Past Surgical History:   Procedure Laterality Date   • CARDIAC CATHETERIZATION  03/23   • CARDIAC ELECTROPHYSIOLOGY STUDY AND ABLATION     • CIRCUMCISION, PRIMARY  1960   • MR HEAD ANGIO WO IV CONTRAST  06/21/2017    MR HEAD ANGIO WO IV CONTRAST 6/21/2017 [facility]   • MR NECK ANGIO WO IV CONTRAST  06/21/2017    MR NECK ANGIO WO IV CONTRAST 6/21/2017 [facility]   • OTHER SURGICAL HISTORY  11/07/2022    No history of surgery   • WISDOM TOOTH EXTRACTION  2020     Past Medical History:   Diagnosis Date   • ADHD (attention deficit hyperactivity disorder) 1966   • Allergic    • Anxiety 1990   • Arrhythmia    • Arthritis 2020   • CHF (congestive heart failure) 10/22   • Depression 1990   • Eczema During sánchez   • Fracture of lumbar spine (Multi)    • H/O supraventricular tachycardia    • Headache 1980   • Hyperlipidemia    • Hypertension    • Lung nodule    • Obesity    • Varicella 1964   • Visual impairment 1975       Current Outpatient Medications:   •  alendronate (Fosamax) 10 mg tablet, Take 1 tablet (10 mg) by mouth once daily., Disp: 90 tablet, Rfl: 1  •  aspirin 81 mg EC tablet, Take 1 tablet (81 mg) by mouth once daily., Disp: , Rfl:   •  buPROPion XL (Wellbutrin XL) 300 mg 24 hr tablet, Take 1 tablet (300 mg) by mouth once daily. Do not crush, chew, or split. Total dose 450mg, Disp: 90 tablet, Rfl: 1  •  cholecalciferol (Vitamin D-3) 5,000 Units tablet, Take 1 tablet (5,000 Units) by mouth once daily., Disp: , Rfl:   •  DULoxetine (Cymbalta) 30 mg DR capsule, Take 1 capsule (30 mg) by mouth 2 times a day. Do not crush or chew.,  Disp: , Rfl:   •  metoprolol succinate XL (Toprol-XL) 25 mg 24 hr tablet, Take 1 tablet (25 mg) by mouth once daily in the evening. Do not crush or chew., Disp: 90 tablet, Rfl: 3  •  primidone (Mysoline) 250 mg tablet, Take 1 tablet (250 mg) by mouth once daily at bedtime., Disp: 90 tablet, Rfl: 2  •  sacubitriL-valsartan (Entresto) 24-26 mg tablet, Take 1 tablet by mouth 2 times a day., Disp: 180 tablet, Rfl: 3  •  apixaban (Eliquis) 5 mg (74 tabs) tablet, Take 2 tablets (10 mg) by mouth 2 times a day for 7 days, then take 1 tablet (5 mg) by mouth 2 times a day., Disp: 74 tablet, Rfl: 0  •  apixaban (Eliquis) 5 mg tablet, Take 2 tablets (10 mg) by mouth 2 times a day for 7 days, THEN 1 tablet (5 mg) 2 times a day., Disp: 88 tablet, Rfl: 0  •  empagliflozin (Jardiance) 10 mg, Take 1 tablet (10 mg) by mouth once daily., Disp: 90 tablet, Rfl: 3  •  metFORMIN  mg 24 hr tablet, Take 1 tablet (500 mg) by mouth once daily in the evening. Take with meals. Do not crush, chew, or split., Disp: 90 tablet, Rfl: 3  •  paliperidone (Invega) 1.5 mg 24 hr tablet, Take 1 tablet (1.5 mg) by mouth once daily at bedtime. (Patient not taking: Reported on 3/31/2025), Disp: , Rfl:   No current facility-administered medications for this encounter.    Facility-Administered Medications Ordered in Other Encounters:   •  dexAMETHasone (Decadron) injection, , intravenous, PRN, MALGORZATA Hawkins, 8 mg at 04/07/25 0927  •  fentaNYL PF (Sublimaze) injection, , intravenous, PRN, MALGORZATA Hawkins, 50 mcg at 04/07/25 0927  •  glycopyrrolate PF (Robinul) 0.2 mg/mL injection, , intravenous, PRN, MALGORZATA Hawkins, 0.2 mg at 04/07/25 0924  •  lactated Ringer's infusion, , intravenous, Continuous PRN, MALGORZATA Hawkins, New Bag at 04/07/25 0924  •  lidocaine HCl (LTA Kit) 4 % vial, , intratracheal, PRN, MALGORZATA Hawkins, 4 mL at 04/07/25 0930  •  lidocaine PF (Xylocaine) 20 mg/mL (2 %) injection, , epidural, PRN, MALGORZATA Hawkins, 100 mg  at 04/07/25 0927  •  midazolam (Versed) injection, , intravenous, PRN, Jarett M Patete, CAA, 2 mg at 04/07/25 0924  •  phenylephrine in NS (Daljit-Synephrine) 100 mcg/mL syringe, , intravenous, PRN, Jarett M Patete, CAA, 100 mcg at 04/07/25 0937  •  propofol (Diprivan) injection, , intravenous, PRN, Jarett M Patete, CAA, 40 mcg/kg/min at 04/07/25 0930  •  rocuronium (ZeMuron) injection, , intravenous, PRN, Jarett M Patete, CAA, 50 mg at 04/07/25 0927  •  white petrolatum-mineral oiL (Tears Naturale PM) ophthalmic ointment, , Both Eyes, PRN, Jarett M Patete, CAA, 2 Application at 04/07/25 0927  Prior to Admission medications    Medication Sig Start Date End Date Taking? Authorizing Provider   alendronate (Fosamax) 10 mg tablet Take 1 tablet (10 mg) by mouth once daily. 1/14/25  Yes Benjamin Dowell MD   aspirin 81 mg EC tablet Take 1 tablet (81 mg) by mouth once daily. 11/7/23  Yes NAT Foster-CNP   buPROPion XL (Wellbutrin XL) 300 mg 24 hr tablet Take 1 tablet (300 mg) by mouth once daily. Do not crush, chew, or split. Total dose 450mg 1/14/25 7/13/25 Yes Benjamin Dowell MD   cholecalciferol (Vitamin D-3) 5,000 Units tablet Take 1 tablet (5,000 Units) by mouth once daily. 11/22/22  Yes Benjamin Dowell MD   DULoxetine (Cymbalta) 30 mg DR capsule Take 1 capsule (30 mg) by mouth 2 times a day. Do not crush or chew.   Yes Historical Provider, MD   metoprolol succinate XL (Toprol-XL) 25 mg 24 hr tablet Take 1 tablet (25 mg) by mouth once daily in the evening. Do not crush or chew. 3/18/25 3/18/26 Yes Benjamin Dowell MD   primidone (Mysoline) 250 mg tablet Take 1 tablet (250 mg) by mouth once daily at bedtime. 1/14/25 10/11/25 Yes Benjamin Dowell MD   sacubitriL-valsartan (Entresto) 24-26 mg tablet Take 1 tablet by mouth 2 times a day. 3/18/25 3/18/26 Yes Benjamin Dowell MD   apixaban (Eliquis) 5 mg (74 tabs) tablet Take 2 tablets (10 mg) by mouth 2 times a day for 7 days, then take 1 tablet (5 mg) by mouth 2  times a day. 3/5/25   Syl Hobbs MD   apixaban (Eliquis) 5 mg tablet Take 2 tablets (10 mg) by mouth 2 times a day for 7 days, THEN 1 tablet (5 mg) 2 times a day. 3/6/25 4/12/25  Syl Hobbs MD   empagliflozin (Jardiance) 10 mg Take 1 tablet (10 mg) by mouth once daily. 25  Benjamin Dowell MD   metFORMIN  mg 24 hr tablet Take 1 tablet (500 mg) by mouth once daily in the evening. Take with meals. Do not crush, chew, or split. 25  Benjamin Dowell MD   paliperidone (Invega) 1.5 mg 24 hr tablet Take 1 tablet (1.5 mg) by mouth once daily at bedtime.  Patient not taking: Reported on 3/31/2025 12/5/24   Historical Provider, MD     Allergies   Allergen Reactions   • Vibramycin [Doxycycline Calcium] Swelling   • Doxycycline Unknown     Social History     Tobacco Use   • Smoking status: Former     Current packs/day: 0.00     Average packs/day: 3.0 packs/day for 39.4 years (118.1 ttl pk-yrs)     Types: Cigarettes     Start date: 1969     Quit date:      Years since quittin.2     Passive exposure: Past   • Smokeless tobacco: Never   Substance Use Topics   • Alcohol use: Not Currently     Comment: Quit in          Chemistry    Lab Results   Component Value Date/Time     (L) 2025 0447    K 4.2 2025 0447     2025 0447    CO2 25 2025 0447    BUN 24 (H) 2025 0447    CREATININE 1.24 2025 0447    Lab Results   Component Value Date/Time    CALCIUM 10.4 (H) 2025 0447    ALKPHOS 77 2025 1217    AST 19 2025 1217    ALT 21 2025 1217    BILITOT 0.3 2025 1217          Lab Results   Component Value Date/Time    WBC 5.4 2025 0447    HGB 14.4 2025 0447    HCT 44.5 2025 0447     2025 0447     Lab Results   Component Value Date/Time    PROTIME 10.3 2023 1552    INR 1.0 2023 1552     Encounter Date: 25   ECG 12 lead   Result Value    Ventricular Rate 100     Atrial Rate 100    MS Interval 158    QRS Duration 94    QT Interval 316    QTC Calculation(Bazett) 407    P Axis 7    R Axis 40    T Axis -51    QRS Count 16    Q Onset 223    P Onset 144    P Offset 191    T Offset 381    QTC Fredericia 374    Narrative    Normal sinus rhythm  Nonspecific ST and T wave abnormality  Abnormal ECG  When compared with ECG of 06-NOV-2023 08:50,  ST more depressed in Inferior leads  ST no longer depressed in Lateral leads  T wave inversion now evident in Inferior leads  Inverted T waves have replaced nonspecific T wave abnormality in Lateral leads  Confirmed by Benjamin Ureña ([id number]) on 3/4/2025 12:41:41 PM     No results found for this or any previous visit from the past 1095 days.        Relevant Problems   Cardiac   (+) AVNRT (AV claire re-entry tachycardia)   (+) Chest pain   (+) HTN (hypertension), benign   (+) Hyperlipidemia LDL goal <130      Pulmonary   (+) Pulmonary embolism without acute cor pulmonale      Neuro   (+) Major depression in remission (CMS-HCC)   (+) Major depressive disorder, recurrent severe without psychotic features (Multi)      Endocrine   (+) Hyperparathyroidism (Multi)   (+) Obesity       Clinical information reviewed:   Tobacco  Allergies  Meds   Med Hx  Surg Hx   Fam Hx  Soc Hx        NPO Detail:  NPO/Void Status  Carbohydrate Drink Given Prior to Surgery? : N  Date of Last Liquid: 04/07/25  Time of Last Liquid: 0630  Date of Last Solid: 04/06/25  Time of Last Solid: 2100  Last Intake Type: Clear fluids         Physical Exam    Airway  Mallampati: II  TM distance: >3 FB  Neck ROM: full     Cardiovascular   Rhythm: regular  Rate: normal     Dental        Pulmonary   (+) rhonchi     Abdominal      Other findings: R sided upper lobe rhonchi noted       Anesthesia Plan    History of general anesthesia?: no  History of complications of general anesthesia?: unknown/emergency    ASA 4     general   (Recent PE, currently on Eliquis.  No  O2 requirement.   Symptomatically improving. )  The patient is not a current smoker.    intravenous induction   Trial extubation is planned.  Anesthetic plan and risks discussed with patient.    Plan discussed with CAA.

## 2025-04-09 LAB
ACID FAST STN SPEC: NORMAL
LABORATORY COMMENT REPORT: NORMAL
LABORATORY COMMENT REPORT: NORMAL
MYCOBACTERIUM SPEC CULT: NORMAL
PATH REPORT.FINAL DX SPEC: NORMAL
PATH REPORT.GROSS SPEC: NORMAL
PATH REPORT.INTRAOP OBS SPEC DOC: NORMAL
PATH REPORT.TOTAL CANCER: NORMAL

## 2025-04-10 LAB
BACTERIA SPEC CULT: NORMAL
GRAM STN SPEC: NORMAL
GRAM STN SPEC: NORMAL

## 2025-04-11 LAB
FUNGUS SPEC CULT: NORMAL
FUNGUS SPEC FUNGUS STN: NORMAL

## 2025-04-14 LAB
FUNGUS SPEC CULT: NORMAL
FUNGUS SPEC FUNGUS STN: NORMAL

## 2025-04-15 ENCOUNTER — OFFICE VISIT (OUTPATIENT)
Dept: PULMONOLOGY | Facility: CLINIC | Age: 65
End: 2025-04-15
Payer: MEDICAID

## 2025-04-15 VITALS
HEIGHT: 75 IN | WEIGHT: 315 LBS | SYSTOLIC BLOOD PRESSURE: 135 MMHG | TEMPERATURE: 96.6 F | DIASTOLIC BLOOD PRESSURE: 91 MMHG | HEART RATE: 73 BPM | BODY MASS INDEX: 39.17 KG/M2 | OXYGEN SATURATION: 98 %

## 2025-04-15 DIAGNOSIS — R59.0 MEDIASTINAL ADENOPATHY: ICD-10-CM

## 2025-04-15 DIAGNOSIS — I26.99 PULMONARY EMBOLISM AND INFARCTION (MULTI): ICD-10-CM

## 2025-04-15 DIAGNOSIS — R91.1 PULMONARY NODULE: Primary | ICD-10-CM

## 2025-04-15 PROCEDURE — 3008F BODY MASS INDEX DOCD: CPT | Performed by: INTERNAL MEDICINE

## 2025-04-15 PROCEDURE — 3080F DIAST BP >= 90 MM HG: CPT | Performed by: INTERNAL MEDICINE

## 2025-04-15 PROCEDURE — 99215 OFFICE O/P EST HI 40 MIN: CPT | Performed by: INTERNAL MEDICINE

## 2025-04-15 PROCEDURE — 3075F SYST BP GE 130 - 139MM HG: CPT | Performed by: INTERNAL MEDICINE

## 2025-04-15 PROCEDURE — 1036F TOBACCO NON-USER: CPT | Performed by: INTERNAL MEDICINE

## 2025-04-15 RX ORDER — ALBUTEROL SULFATE 0.83 MG/ML
3 SOLUTION RESPIRATORY (INHALATION) ONCE
OUTPATIENT
Start: 2025-04-15 | End: 2025-04-15

## 2025-04-15 RX ORDER — ALBUTEROL SULFATE 90 UG/1
1 INHALANT RESPIRATORY (INHALATION) ONCE
OUTPATIENT
Start: 2025-04-15

## 2025-04-15 ASSESSMENT — ENCOUNTER SYMPTOMS
UNEXPECTED WEIGHT CHANGE: 0
SHORTNESS OF BREATH: 1
BRUISES/BLEEDS EASILY: 0
COUGH: 0
TREMORS: 1
WHEEZING: 0

## 2025-04-15 NOTE — LETTER
April 15, 2025     Bnejamin Dowell MD  [address] [facility] [address]    Patient: Brandin Barfield   YOB: 1960   Date of Visit: 4/15/2025       Dear Dr. Benjamin Dowell MD:    Thank you for referring Brandin Barfield to me for evaluation. Below are my notes for this consultation.  If you have questions, please do not hesitate to call me. I look forward to following your patient along with you.       Sincerely,     Chelsea Nieves, DO      CC: No Recipients  ______________________________________________________________________________________        Department of Medicine  Division of Pulmonary, Critical Care, and Sleep Medicine  Location  [facility], [address]    I was asked by No ref. provider found, to evaluate Brandin Barfield for EBUS results. I have independently interviewed and examined the patient in the office and reviewed available records.     Physician HPI (4/15/2025):  64 y.o. male who was admitted to [facility] on 3/4/2025 for acute onset shortness of breath.  He was found to have bilateral pulmonary emboli, bilateral hilar and mediastinal adenopathy, as well as a 14 mm right upper lobe nodule.  After being deemed a nonthrombectomy candidate by PERT team, he was eventually discharged on apixaban.  At the recommendations of inpatient pulmonary team, PET CT was performed on 3/20/2025 and the right upper lobe nodule had SUV of 1.7, with right hilar mediastinal lymph nodes SUV of 8.1.  He then underwent EBUS on 4/7/2025.  Stations 4L, 4R, 7, 11Rs were sampled, and no malignant cells were identified.    He has a 118 pack/year history having quit in 2009.    PMH:  Past Medical History:   Diagnosis Date   • ADHD (attention deficit hyperactivity disorder) 1966   • Allergic    • Anxiety 1990   • Arrhythmia    • Arthritis 2020   • CHF (congestive heart failure) 10/22   • Depression 1990   • Eczema During sánchez   • Fracture of lumbar spine (Multi)    • H/O supraventricular  tachycardia    • Headache    • Hyperlipidemia    • Hypertension    • Lung nodule    • Obesity    • Varicella    • Visual impairment        PSH:  Past Surgical History:   Procedure Laterality Date   • CARDIAC CATHETERIZATION     • CARDIAC ELECTROPHYSIOLOGY STUDY AND ABLATION     • CIRCUMCISION, PRIMARY  1960   • MR HEAD ANGIO WO IV CONTRAST  2017    MR HEAD ANGIO WO IV CONTRAST 2017 [facility]   • MR NECK ANGIO WO IV CONTRAST  2017    MR NECK ANGIO WO IV CONTRAST 2017 [facility]   • OTHER SURGICAL HISTORY  2022    No history of surgery   • WISDOM TOOTH EXTRACTION         FHx:  Family History   Problem Relation Name Age of Onset   • Coronary artery disease Father Fernando Schwartz Klinect    • Heart disease Father Fernando Schwartz Klinect    • Coronary artery disease Brother Fidel King Klinect    • Drug abuse Brother Fidel Duarteinect    • Arthritis Brother Fidel Duarteinect    • Depression Mother Betsy Duarteinect    • Hypertension Mother Betsy Duarteinect    • Mental illness Mother Betsy Duarteinect        Social Hx:  Social History     Socioeconomic History   • Marital status: Single   Tobacco Use   • Smoking status: Former     Current packs/day: 0.00     Average packs/day: 3.0 packs/day for 39.4 years (118.1 ttl pk-yrs)     Types: Cigarettes     Start date: 1969     Quit date: 2009     Years since quittin.2     Passive exposure: Past   • Smokeless tobacco: Never   Vaping Use   • Vaping status: Never Used   Substance and Sexual Activity   • Alcohol use: Not Currently     Comment: Quit in    • Drug use: Never   • Sexual activity: Not Currently     Partners: Female     Birth control/protection: Condom Male     Social Drivers of Health     Financial Resource Strain: Low Risk  (3/6/2025)    Overall Financial Resource Strain (CARDIA)    • Difficulty of Paying Living Expenses: Not very hard   Food Insecurity: No Food Insecurity  (3/4/2025)    Hunger Vital Sign    • Worried About Running Out of Food in the Last Year: Never true    • Ran Out of Food in the Last Year: Never true   Transportation Needs: No Transportation Needs (3/6/2025)    PRAPARE - Transportation    • Lack of Transportation (Medical): No    • Lack of Transportation (Non-Medical): No   Physical Activity: Not on File (6/10/2024)    Received from [facility]    Physical Activity    • Physical Activity: 0   Stress: At Risk (6/18/2024)    [facility] from [facility]    Stress    • Stress: 2   Social Connections: Not at Risk (6/18/2024)    Received from [facility]    Social Connections    • Connectedness: 1   Intimate Partner Violence: Not At Risk (3/4/2025)    Humiliation, Afraid, Rape, and Kick questionnaire    • Fear of Current or Ex-Partner: No    • Emotionally Abused: No    • Physically Abused: No    • Sexually Abused: No   Housing Stability: Low Risk  (3/6/2025)    Housing Stability Vital Sign    • Unable to Pay for Housing in the Last Year: No    • Number of Times Moved in the Last Year: 0    • Homeless in the Last Year: No       Immunization History:    There is no immunization history on file for this patient.    Current Medications:  Current Outpatient Medications   Medication Instructions   • alendronate (FOSAMAX) 10 mg, oral, Daily   • apixaban (Eliquis) 5 mg (74 tabs) tablet Take 2 tablets (10 mg) by mouth 2 times a day for 7 days, then take 1 tablet (5 mg) by mouth 2 times a day.   • apixaban (Eliquis) 5 mg tablet Take 2 tablets (10 mg) by mouth 2 times a day for 7 days, THEN 1 tablet (5 mg) 2 times a day.   • aspirin 81 mg, oral, Daily   • buPROPion XL (WELLBUTRIN XL) 300 mg, oral, Daily, Do not crush, chew, or split. Total dose 450mg   • cholecalciferol (Vitamin D-3) 5,000 Units tablet 1 tablet, Daily   • DULoxetine (CYMBALTA) 30 mg, 2 times daily   • empagliflozin (JARDIANCE) 10 mg, oral, Daily RT   • metFORMIN XR (GLUCOPHAGE-XR) 500 mg, oral, Daily with evening meal,  "Do not crush, chew, or split.   • metoprolol succinate XL (TOPROL-XL) 25 mg, oral, Every evening, Do not crush or chew.   • paliperidone (Invega) 1.5 mg 24 hr tablet 1 tablet, Nightly   • primidone (MYSOLINE) 250 mg, oral, Nightly   • sacubitriL-valsartan (Entresto) 24-26 mg tablet 1 tablet, oral, 2 times daily        Drug Allergies/Intolerances:  Allergies   Allergen Reactions   • Vibramycin [Doxycycline Calcium] Swelling   • Doxycycline Unknown        Review of Systems:  Review of Systems   Constitutional:  Negative for unexpected weight change.   Respiratory:  Positive for shortness of breath. Negative for cough and wheezing.    Cardiovascular:  Negative for leg swelling.   Neurological:  Positive for tremors.   Hematological:  Does not bruise/bleed easily.        All other review of systems are negative and/or non-contributory.    Physical Examination:  Vitals:    04/15/25 1525   BP: (!) 135/91   Pulse: 73   Temp: 35.9 °C (96.6 °F)   SpO2: 98%   Weight: 144 kg (318 lb 8 oz)   Height: 1.905 m (6' 3\")        GEN: appears well  CV: RRR, no m/g/r  LUNGS: good effort, clear bilaterally, no w/r/r  ABD: Soft, nontender  EXT: no edema, cyanosis, clubbing  NEURO: strength equal bilaterally, sensation grossly intact        Exacerbation History      None    Pulmonary Function Test Results     None on file    Sleep Study     None    CAT and mMRC     mMRC (Modified [facility] [place]) Dyspnea Scale  Dyspnea when hurrying or walking up a hill: +1    Reference: Tom DA, Wells CK. Evaluation of clinical methods for rating dyspnea. CHEST. 1988;93(3):580-6.    Peak Flow and ACT     N/A    Chest Radiograph     XR chest 1 view 03/04/2025    Impression  No evidence of acute intrathoracic abnormality.    Signed by: Andrea Hartmann 3/4/2025 12:05 PM  Dictation workstation:   [id number]      Chest CT Scan     PET/CT 3/20/2025:  1. Mildly hypermetabolic right upper lobe nodule which may be  infectious/inflammatory or reflect " underlying malignancy. Recommend  tissue sampling.  2. Hypermetabolic right hilar and mediastinal lymphadenopathy  compatible with metastatic disease.  3. Hypermetabolic periportal lymphadenopathy concerning for  additional site of metastatic disease.    3/4/2024 Lower extremity venous duplex:  IMPRESSION:  Negative study.  No deep venous thrombosis of the  right lower  extremity.    CT angio chest for pulmonary embolism 03/04/2025    COMPARISON:  Comparison study is from 11/06/2023. Correlation with chest x-ray  from earlier this afternoon.    MEDIASTINUM/OSORIO:  Mild nonspecific right hilar adenopathy with lymph nodes measuring up  to 22 mm in short axis. There is very mild left hilar adenopathy with  lymph nodes measuring up to 9 mm short axis. There is subcarinal  adenopathy measuring 16 mm short axis. There is mild central  mediastinal adenopathy with lymph nodes measuring up to 14 mm short  axis. Scant small fat centered lymph nodes in each axilla, most  likely reactive. No cardiomegaly.  No pericardial effusion.  No thoracic aortic aneurysm or dissection.  For technical reasons or perhaps related to patient body habitus,  there is suboptimal opacification of the pulmonary arteries. Despite  this, there are segmental proximal left lower lobe pulmonary artery  filling defects consistent with pulmonary emboli, and there are  pulmonary emboli in the proximal right middle lobe pulmonary artery,  in the proximal right lower lobe pulmonary artery, and in several  right lower lobe segmental pulmonary arteries. There is also a  segmental posterior right upper lobe pulmonary artery filling defect  consistent with embolism. No CT evidence of right heart strain.    LUNGS/ PLEURA/ AND TRACHEA:  On the prior exam, there was a right-sided perihilar pneumonia, with  a small adjacent nodule lateral to this in the right upper lobe that  previously measured 11 x 10 mm. Today, there is a more smoothly  marginated but still  lobulated nodule in the right lower lobe in this  area that measures 14 x 11 mm, larger than on the previous exam.  There is no other abnormal density in either lung. No  pleural  effusion or pneumothorax. The trachea was grossly intact.    Impression  Multifocal bilateral pulmonary embolism as described. No CT evidence  of right heart strain at this time.    Mild nonspecific bilateral hilar adenopathy, right greater than left,  and also mediastinal adenopathy.    Solitary pulmonary nodule: Smoothly marginated but lobulated  noncalcified central right upper lobe lung nodule measuring 14 x 11  mm today, compared to 11 x 10 mm on 11/06/2023. This could be a  slowly enlarging neoplasm. Recommend PET-CT scan in follow-up.    Mild nonspecific but grossly stable mediastinal and bilateral hilar  (right greater than left) adenopathy, perhaps reactive. This could  also be further evaluated with PET-CT scan.    Bilateral renal cysts.    Stable compression fractures with anterior wedging at L1 and at T4.    MACRO:  None    Signed by: Sandeep Kidd 3/4/2025 2:38 PM  Dictation workstation:   [id number]     11/6/2023:  1. No pulmonary embolus.  2. Consolidative opacity seen in the right hilum and right upper lobe  may be related to persistent atypical/viral pneumonia. As compared to  the prior examination from December 2021 there has been significant  interval improvement in multifocal areas of ground-glass infiltrate  in both lungs. Recommend follow-up CT in 3-6 months to assess for  resolution.    Bronchoscopy     4/7/2025:  The trachea is of normal caliber. The kay is sharp. The tracheobronchial tree of the bilateral lung(s) was examined to at least the first subsegmental level.  Bronchial anatomy is normal; there are no endobronchial lesions, and no secretions.     The 11L (interlobar) node measured 4.7 mm in size. Sampling was not done due to size criteria (less than 5 mm).  The 4L (lower paratracheal) node measured 5.4  mm in size. Sampling was done, 4 samples with the needle were obtained.  The 2L (upper paratracheal) node was not visualized. Sampling was not done as the node was not visualized.  The 7 (subcarinal) node measured 14.5 mm in size. Sampling was done, 4 samples with the needle were obtained.  The 4R (lower paratracheal) node measured 12.8 mm in size. Sampling was done, 4 samples with the needle were obtained.  The 2R (upper paratracheal) node was not visualized. Sampling was not done as the node was not visualized.  The 11Rs (superios interlobar) node measured 11.6 mm in size. Sampling was done, 4 samples with the needle were obtained.  The 11Ri (inferior interlobar) node measured 3.2 mm in size. Sampling was not done due to size criteria (less than 5 mm).    A. LYMPH NODE 4 L PULMONARY FINE NEEDLE ASPIRATION , CYTOLOGY AND CELL BLOCK:                 No malignant cells identified                Limited lymphoid sample                  B. LYMPH NODE 4 R PULMONARY FINE NEEDLE ASPIRATION , CYTOLOGY AND CELL BLOCK:                 No malignant cells identified                Lymphoid sample with rare epithelioid histiocytes                                C. LYMPH NODE 7 PULMONARY FINE NEEDLE ASPIRATION , CYTOLOGY AND CELL BLOCK:                 No malignant cells identified                Lymphoid sample                   D. LYMPH NODE 11 Rs PULMONARY FINE NEEDLE ASPIRATION , CYTOLOGY AND CELL BLOCK:                 No malignant cells identified                Lymphoid sample     Labs     Lab Results   Component Value Date    WBC 5.4 03/06/2025    HGB 14.4 03/06/2025    HCT 44.5 03/06/2025    MCV 87 03/06/2025     03/06/2025     Lab Results   Component Value Date    BNP 9 03/04/2025     Lab Results   Component Value Date    EOSABS 0.39 03/04/2025         Echocardiogram     3/5/2025:  1. The left ventricular systolic function is low normal, with a visually estimated ejection fraction of 50-55%.   2. Unable to  determine right ventricular systolic function.   3. Technically very difficult study.   4. Right ventricle appeared to be of normal size, unable to assess function.    ASSESSMENT & PLAN     Problem List Items Addressed This Visit       Mediastinal adenopathy    Pulmonary nodule - Primary     Other Visit Diagnoses       Pulmonary embolism and infarction (Multi)        Relevant Medications    apixaban (Eliquis) 5 mg tablet    Other Relevant Orders    Complete Pulmonary Function Test Pre/Post Bronchodilator (Spirometry Pre/Post/DLCO/Lung Volumes)             Summary:  64 y.o. male with recently diagnosed bilateral unprovoked pulmonary emboli and a slowly growing RUL nodule now measuring 14mm. EBUS negative for malignancy, however, the nodule was not biopsied and could still potentially be a slow growing tumor. [facility] solitary pulmonary nodule malignancy risk is 28%. If this nodule is malignant, this would be considered stage IA disease and can be cured if resected.  Patient will need PFT, and will also proceed with Nodify CDT/XL2 testing.  Pending results of Nodify test, will need referral to thoracic surgery.    Regarding his pulmonary emboli, he should be continued on systemic anticoagulation for minimum 3 months.  There appears to be an interaction with primidone and apixaban as well as other DOAC's.  The effectiveness of DOAC's may be reduced due to primidone's cytochrome P450 induction properties.  Safer option may be warfarin.    Plan:  -Refill patient's apixaban as he is running out.  Can consider referral to Coumadin clinic.  -Nodify CDT/XL2 home phlebotomy testing.  Will call patient with results  -PFT    Follow-up: 1 month      Chelsea Nieves DO  Staff Physician - Pulmonary & Critical Care  04/15/25 3:23 PM  Office number: [phone]   Fax number:  [phone]

## 2025-04-15 NOTE — PROGRESS NOTES
Department of Medicine  Division of Pulmonary, Critical Care, and Sleep Medicine  Location  [facility], [address]    I was asked by No ref. provider found, to evaluate Brandin Barfield for EBUS results. I have independently interviewed and examined the patient in the office and reviewed available records.     Physician HPI (4/15/2025):  64 y.o. male who was admitted to [facility], [place] on 3/4/2025 for acute onset shortness of breath.  He was found to have bilateral pulmonary emboli, bilateral hilar and mediastinal adenopathy, as well as a 14 mm right upper lobe nodule.  After being deemed a nonthrombectomy candidate by PERT team, he was eventually discharged on apixaban.  At the recommendations of inpatient pulmonary team, PET CT was performed on 3/20/2025 and the right upper lobe nodule had SUV of 1.7, with right hilar mediastinal lymph nodes SUV of 8.1.  He then underwent EBUS on 4/7/2025.  Stations 4L, 4R, 7, 11Rs were sampled, and no malignant cells were identified.    He has a 118 pack/year history having quit in 2009.    PMH:  Past Medical History:   Diagnosis Date    ADHD (attention deficit hyperactivity disorder) 1966    Allergic     Anxiety 1990    Arrhythmia     Arthritis 2020    CHF (congestive heart failure) 10/22    Depression 1990    Eczema During sánchez    Fracture of lumbar spine (Multi)     H/O supraventricular tachycardia     Headache 1980    Hyperlipidemia     Hypertension     Lung nodule     Obesity     Varicella 1964    Visual impairment 1975       PSH:  Past Surgical History:   Procedure Laterality Date    CARDIAC CATHETERIZATION  03/23    CARDIAC ELECTROPHYSIOLOGY STUDY AND ABLATION      CIRCUMCISION, PRIMARY  1960    MR HEAD ANGIO WO IV CONTRAST  06/21/2017    MR HEAD ANGIO WO IV CONTRAST 6/21/2017 [facility]    MR NECK ANGIO WO IV CONTRAST  06/21/2017    MR NECK ANGIO WO IV CONTRAST 6/21/2017 [facility]    OTHER SURGICAL HISTORY  11/07/2022    No history of  surgery    WISDOM TOOTH EXTRACTION         FHx:  Family History   Problem Relation Name Age of Onset    Coronary artery disease Father Fernando Schwartz Klinect     Heart disease Father Fernando Duarteinect     Coronary artery disease Brother Fidel Urbanoct     Drug abuse Brother Fidel Urbanoct     Arthritis Brother Fidel Urbanoct     Depression Mother Betsy Barfield     Hypertension Mother Betsy Urbanoct     Mental illness Mother Betsy Barfield        Social Hx:  Social History     Socioeconomic History    Marital status: Single   Tobacco Use    Smoking status: Former     Current packs/day: 0.00     Average packs/day: 3.0 packs/day for 39.4 years (118.1 ttl pk-yrs)     Types: Cigarettes     Start date: 1969     Quit date: 2009     Years since quittin.2     Passive exposure: Past    Smokeless tobacco: Never   Vaping Use    Vaping status: Never Used   Substance and Sexual Activity    Alcohol use: Not Currently     Comment: Quit in     Drug use: Never    Sexual activity: Not Currently     Partners: Female     Birth control/protection: Condom Male     Social Drivers of Health     Financial Resource Strain: Low Risk  (3/6/2025)    Overall Financial Resource Strain (CARDIA)     Difficulty of Paying Living Expenses: Not very hard   Food Insecurity: No Food Insecurity (3/4/2025)    Hunger Vital Sign     Worried About Running Out of Food in the Last Year: Never true     Ran Out of Food in the Last Year: Never true   Transportation Needs: No Transportation Needs (3/6/2025)    PRAPARE - Transportation     Lack of Transportation (Medical): No     Lack of Transportation (Non-Medical): No   Physical Activity: Not on File (6/10/2024)    Received from [facility]    Physical Activity     Physical Activity: 0   Stress: At Risk (2024)    Received from [facility]    Stress     Stress: 2   Social Connections: Not at Risk (2024)    Received from [facility]    Social Connections      Connectedness: 1   Intimate Partner Violence: Not At Risk (3/4/2025)    Humiliation, Afraid, Rape, and Kick questionnaire     Fear of Current or Ex-Partner: No     Emotionally Abused: No     Physically Abused: No     Sexually Abused: No   Housing Stability: Low Risk  (3/6/2025)    Housing Stability Vital Sign     Unable to Pay for Housing in the Last Year: No     Number of Times Moved in the Last Year: 0     Homeless in the Last Year: No       Immunization History:    There is no immunization history on file for this patient.    Current Medications:  Current Outpatient Medications   Medication Instructions    alendronate (FOSAMAX) 10 mg, oral, Daily    apixaban (Eliquis) 5 mg (74 tabs) tablet Take 2 tablets (10 mg) by mouth 2 times a day for 7 days, then take 1 tablet (5 mg) by mouth 2 times a day.    apixaban (Eliquis) 5 mg tablet Take 2 tablets (10 mg) by mouth 2 times a day for 7 days, THEN 1 tablet (5 mg) 2 times a day.    aspirin 81 mg, oral, Daily    buPROPion XL (WELLBUTRIN XL) 300 mg, oral, Daily, Do not crush, chew, or split. Total dose 450mg    cholecalciferol (Vitamin D-3) 5,000 Units tablet 1 tablet, Daily    DULoxetine (CYMBALTA) 30 mg, 2 times daily    empagliflozin (JARDIANCE) 10 mg, oral, Daily RT    metFORMIN XR (GLUCOPHAGE-XR) 500 mg, oral, Daily with evening meal, Do not crush, chew, or split.    metoprolol succinate XL (TOPROL-XL) 25 mg, oral, Every evening, Do not crush or chew.    paliperidone (Invega) 1.5 mg 24 hr tablet 1 tablet, Nightly    primidone (MYSOLINE) 250 mg, oral, Nightly    sacubitriL-valsartan (Entresto) 24-26 mg tablet 1 tablet, oral, 2 times daily        Drug Allergies/Intolerances:  Allergies   Allergen Reactions    Vibramycin [Doxycycline Calcium] Swelling    Doxycycline Unknown        Review of Systems:  Review of Systems   Constitutional:  Negative for unexpected weight change.   Respiratory:  Positive for shortness of breath. Negative for cough and wheezing.   "  Cardiovascular:  Negative for leg swelling.   Neurological:  Positive for tremors.   Hematological:  Does not bruise/bleed easily.        All other review of systems are negative and/or non-contributory.    Physical Examination:  Vitals:    04/15/25 1525   BP: (!) 135/91   Pulse: 73   Temp: 35.9 °C (96.6 °F)   SpO2: 98%   Weight: 144 kg (318 lb 8 oz)   Height: 1.905 m (6' 3\")        GEN: appears well  CV: RRR, no m/g/r  LUNGS: good effort, clear bilaterally, no w/r/r  ABD: Soft, nontender  EXT: no edema, cyanosis, clubbing  NEURO: strength equal bilaterally, sensation grossly intact        Exacerbation History      None    Pulmonary Function Test Results     None on file    Sleep Study     None    CAT and mMRC     mMRC (Modified [employer] [place]) Dyspnea Scale  Dyspnea when hurrying or walking up a hill: +1    Reference: Tom DA, Markos CK. Evaluation of clinical methods for rating dyspnea. CHEST. 1988;93(3):580-6.    Peak Flow and ACT     N/A    Chest Radiograph     XR chest 1 view 03/04/2025    Impression  No evidence of acute intrathoracic abnormality.    Signed by: Andrea Hartmann 3/4/2025 12:05 PM  Dictation workstation:   [id number]      Chest CT Scan     PET/CT 3/20/2025:  1. Mildly hypermetabolic right upper lobe nodule which may be  infectious/inflammatory or reflect underlying malignancy. Recommend  tissue sampling.  2. Hypermetabolic right hilar and mediastinal lymphadenopathy  compatible with metastatic disease.  3. Hypermetabolic periportal lymphadenopathy concerning for  additional site of metastatic disease.    3/4/2024 Lower extremity venous duplex:  IMPRESSION:  Negative study.  No deep venous thrombosis of the  right lower  extremity.    CT angio chest for pulmonary embolism 03/04/2025    COMPARISON:  Comparison study is from 11/06/2023. Correlation with chest x-ray  from earlier this afternoon.    MEDIASTINUM/OSORIO:  Mild nonspecific right hilar adenopathy with lymph nodes measuring up  to " 22 mm in short axis. There is very mild left hilar adenopathy with  lymph nodes measuring up to 9 mm short axis. There is subcarinal  adenopathy measuring 16 mm short axis. There is mild central  mediastinal adenopathy with lymph nodes measuring up to 14 mm short  axis. Scant small fat centered lymph nodes in each axilla, most  likely reactive. No cardiomegaly.  No pericardial effusion.  No thoracic aortic aneurysm or dissection.  For technical reasons or perhaps related to patient body habitus,  there is suboptimal opacification of the pulmonary arteries. Despite  this, there are segmental proximal left lower lobe pulmonary artery  filling defects consistent with pulmonary emboli, and there are  pulmonary emboli in the proximal right middle lobe pulmonary artery,  in the proximal right lower lobe pulmonary artery, and in several  right lower lobe segmental pulmonary arteries. There is also a  segmental posterior right upper lobe pulmonary artery filling defect  consistent with embolism. No CT evidence of right heart strain.    LUNGS/ PLEURA/ AND TRACHEA:  On the prior exam, there was a right-sided perihilar pneumonia, with  a small adjacent nodule lateral to this in the right upper lobe that  previously measured 11 x 10 mm. Today, there is a more smoothly  marginated but still lobulated nodule in the right lower lobe in this  area that measures 14 x 11 mm, larger than on the previous exam.  There is no other abnormal density in either lung. No  pleural  effusion or pneumothorax. The trachea was grossly intact.    Impression  Multifocal bilateral pulmonary embolism as described. No CT evidence  of right heart strain at this time.    Mild nonspecific bilateral hilar adenopathy, right greater than left,  and also mediastinal adenopathy.    Solitary pulmonary nodule: Smoothly marginated but lobulated  noncalcified central right upper lobe lung nodule measuring 14 x 11  mm today, compared to 11 x 10 mm on 11/06/2023.  This could be a  slowly enlarging neoplasm. Recommend PET-CT scan in follow-up.    Mild nonspecific but grossly stable mediastinal and bilateral hilar  (right greater than left) adenopathy, perhaps reactive. This could  also be further evaluated with PET-CT scan.    Bilateral renal cysts.    Stable compression fractures with anterior wedging at L1 and at T4.    MACRO:  None    Signed by: Sandeep Kidd 3/4/2025 2:38 PM  Dictation workstation:   [id number]     11/6/2023:  1. No pulmonary embolus.  2. Consolidative opacity seen in the right hilum and right upper lobe  may be related to persistent atypical/viral pneumonia. As compared to  the prior examination from December 2021 there has been significant  interval improvement in multifocal areas of ground-glass infiltrate  in both lungs. Recommend follow-up CT in 3-6 months to assess for  resolution.    Bronchoscopy     4/7/2025:  The trachea is of normal caliber. The kay is sharp. The tracheobronchial tree of the bilateral lung(s) was examined to at least the first subsegmental level.  Bronchial anatomy is normal; there are no endobronchial lesions, and no secretions.     The 11L (interlobar) node measured 4.7 mm in size. Sampling was not done due to size criteria (less than 5 mm).  The 4L (lower paratracheal) node measured 5.4 mm in size. Sampling was done, 4 samples with the needle were obtained.  The 2L (upper paratracheal) node was not visualized. Sampling was not done as the node was not visualized.  The 7 (subcarinal) node measured 14.5 mm in size. Sampling was done, 4 samples with the needle were obtained.  The 4R (lower paratracheal) node measured 12.8 mm in size. Sampling was done, 4 samples with the needle were obtained.  The 2R (upper paratracheal) node was not visualized. Sampling was not done as the node was not visualized.  The 11Rs (superios interlobar) node measured 11.6 mm in size. Sampling was done, 4 samples with the needle were obtained.  The  11Ri (inferior interlobar) node measured 3.2 mm in size. Sampling was not done due to size criteria (less than 5 mm).    A. LYMPH NODE 4 L PULMONARY FINE NEEDLE ASPIRATION , CYTOLOGY AND CELL BLOCK:                 No malignant cells identified                Limited lymphoid sample                  B. LYMPH NODE 4 R PULMONARY FINE NEEDLE ASPIRATION , CYTOLOGY AND CELL BLOCK:                 No malignant cells identified                Lymphoid sample with rare epithelioid histiocytes                                C. LYMPH NODE 7 PULMONARY FINE NEEDLE ASPIRATION , CYTOLOGY AND CELL BLOCK:                 No malignant cells identified                Lymphoid sample                   D. LYMPH NODE 11 Rs PULMONARY FINE NEEDLE ASPIRATION , CYTOLOGY AND CELL BLOCK:                 No malignant cells identified                Lymphoid sample     Labs     Lab Results   Component Value Date    WBC 5.4 03/06/2025    HGB 14.4 03/06/2025    HCT 44.5 03/06/2025    MCV 87 03/06/2025     03/06/2025     Lab Results   Component Value Date    BNP 9 03/04/2025     Lab Results   Component Value Date    EOSABS 0.39 03/04/2025         Echocardiogram     3/5/2025:  1. The left ventricular systolic function is low normal, with a visually estimated ejection fraction of 50-55%.   2. Unable to determine right ventricular systolic function.   3. Technically very difficult study.   4. Right ventricle appeared to be of normal size, unable to assess function.    ASSESSMENT & PLAN     Problem List Items Addressed This Visit       Mediastinal adenopathy    Pulmonary nodule - Primary     Other Visit Diagnoses       Pulmonary embolism and infarction (Multi)        Relevant Medications    apixaban (Eliquis) 5 mg tablet    Other Relevant Orders    Complete Pulmonary Function Test Pre/Post Bronchodilator (Spirometry Pre/Post/DLCO/Lung Volumes)             Summary:  64 y.o. male with recently diagnosed bilateral unprovoked pulmonary emboli and a  slowly growing RUL nodule now measuring 14mm. EBUS negative for malignancy, however, the nodule was not biopsied and could still potentially be a slow growing tumor. [facility] solitary pulmonary nodule malignancy risk is 28%. If this nodule is malignant, this would be considered stage IA disease and can be cured if resected.  Patient will need PFT, and will also proceed with Nodify CDT/XL2 testing.  Pending results of Nodify test, will need referral to thoracic surgery.    Regarding his pulmonary emboli, he should be continued on systemic anticoagulation for minimum 3 months.  There appears to be an interaction with primidone and apixaban as well as other DOAC's.  The effectiveness of DOAC's may be reduced due to primidone's cytochrome P450 induction properties.  Safer option may be warfarin.    Plan:  -Refill patient's apixaban as he is running out.  Can consider referral to Coumadin clinic.  -Nodify CDT/XL2 home phlebotomy testing.  Will call patient with results  -PFT    Follow-up: 1 month      Chelsea Nieves DO  Staff Physician - Pulmonary & Critical Care  04/15/25 3:23 PM  Office number: [phone]   Fax number:  [phone]

## 2025-04-16 LAB
ACID FAST STN SPEC: NORMAL
MYCOBACTERIUM SPEC CULT: NORMAL

## 2025-04-21 LAB
FUNGUS SPEC CULT: NORMAL
FUNGUS SPEC FUNGUS STN: NORMAL

## 2025-04-22 ENCOUNTER — APPOINTMENT (OUTPATIENT)
Dept: RADIOLOGY | Facility: HOSPITAL | Age: 65
End: 2025-04-22
Payer: MEDICAID

## 2025-04-22 ENCOUNTER — APPOINTMENT (OUTPATIENT)
Dept: CARDIOLOGY | Facility: HOSPITAL | Age: 65
End: 2025-04-22
Payer: MEDICAID

## 2025-04-22 ENCOUNTER — HOSPITAL ENCOUNTER (EMERGENCY)
Facility: HOSPITAL | Age: 65
Discharge: ANOTHER HOSPITAL | End: 2025-04-23
Attending: EMERGENCY MEDICINE
Payer: MEDICAID

## 2025-04-22 DIAGNOSIS — R06.02 SHORTNESS OF BREATH: Primary | ICD-10-CM

## 2025-04-22 DIAGNOSIS — I10 DIASTOLIC HYPERTENSION: ICD-10-CM

## 2025-04-22 DIAGNOSIS — R05.1 ACUTE COUGH: ICD-10-CM

## 2025-04-22 LAB
ALBUMIN SERPL BCP-MCNC: 4.1 G/DL (ref 3.4–5)
ALP SERPL-CCNC: 71 U/L (ref 33–136)
ALT SERPL W P-5'-P-CCNC: 17 U/L (ref 10–52)
ANION GAP SERPL CALCULATED.3IONS-SCNC: 10 MMOL/L (ref 10–20)
AST SERPL W P-5'-P-CCNC: 14 U/L (ref 9–39)
BASOPHILS # BLD AUTO: 0.06 X10*3/UL (ref 0–0.1)
BASOPHILS NFR BLD AUTO: 0.6 %
BILIRUB SERPL-MCNC: 0.4 MG/DL (ref 0–1.2)
BNP SERPL-MCNC: 25 PG/ML (ref 0–99)
BUN SERPL-MCNC: 23 MG/DL (ref 6–23)
CALCIUM SERPL-MCNC: 10.8 MG/DL (ref 8.6–10.3)
CARDIAC TROPONIN I PNL SERPL HS: 7 NG/L (ref 0–20)
CHLORIDE SERPL-SCNC: 105 MMOL/L (ref 98–107)
CO2 SERPL-SCNC: 26 MMOL/L (ref 21–32)
CREAT SERPL-MCNC: 1.38 MG/DL (ref 0.5–1.3)
EGFRCR SERPLBLD CKD-EPI 2021: 57 ML/MIN/1.73M*2
EOSINOPHIL # BLD AUTO: 0.33 X10*3/UL (ref 0–0.7)
EOSINOPHIL NFR BLD AUTO: 3.1 %
ERYTHROCYTE [DISTWIDTH] IN BLOOD BY AUTOMATED COUNT: 13.7 % (ref 11.5–14.5)
FLUAV RNA RESP QL NAA+PROBE: NOT DETECTED
FLUBV RNA RESP QL NAA+PROBE: NOT DETECTED
GLUCOSE SERPL-MCNC: 90 MG/DL (ref 74–99)
HCT VFR BLD AUTO: 47.1 % (ref 41–52)
HGB BLD-MCNC: 15.8 G/DL (ref 13.5–17.5)
IMM GRANULOCYTES # BLD AUTO: 0.03 X10*3/UL (ref 0–0.7)
IMM GRANULOCYTES NFR BLD AUTO: 0.3 % (ref 0–0.9)
LACTATE SERPL-SCNC: 1.5 MMOL/L (ref 0.4–2)
LYMPHOCYTES # BLD AUTO: 1.18 X10*3/UL (ref 1.2–4.8)
LYMPHOCYTES NFR BLD AUTO: 11.1 %
MCH RBC QN AUTO: 29 PG (ref 26–34)
MCHC RBC AUTO-ENTMCNC: 33.5 G/DL (ref 32–36)
MCV RBC AUTO: 87 FL (ref 80–100)
MONOCYTES # BLD AUTO: 0.84 X10*3/UL (ref 0.1–1)
MONOCYTES NFR BLD AUTO: 7.9 %
NEUTROPHILS # BLD AUTO: 8.17 X10*3/UL (ref 1.2–7.7)
NEUTROPHILS NFR BLD AUTO: 77 %
NRBC BLD-RTO: 0 /100 WBCS (ref 0–0)
PLATELET # BLD AUTO: 226 X10*3/UL (ref 150–450)
POTASSIUM SERPL-SCNC: 4.3 MMOL/L (ref 3.5–5.3)
PROT SERPL-MCNC: 7.5 G/DL (ref 6.4–8.2)
RBC # BLD AUTO: 5.44 X10*6/UL (ref 4.5–5.9)
RSV RNA RESP QL NAA+PROBE: NOT DETECTED
SARS-COV-2 RNA RESP QL NAA+PROBE: NOT DETECTED
SODIUM SERPL-SCNC: 137 MMOL/L (ref 136–145)
WBC # BLD AUTO: 10.6 X10*3/UL (ref 4.4–11.3)

## 2025-04-22 PROCEDURE — 84484 ASSAY OF TROPONIN QUANT: CPT | Performed by: EMERGENCY MEDICINE

## 2025-04-22 PROCEDURE — 99285 EMERGENCY DEPT VISIT HI MDM: CPT | Mod: 25 | Performed by: EMERGENCY MEDICINE

## 2025-04-22 PROCEDURE — 2550000001 HC RX 255 CONTRASTS: Performed by: EMERGENCY MEDICINE

## 2025-04-22 PROCEDURE — 85025 COMPLETE CBC W/AUTO DIFF WBC: CPT | Performed by: EMERGENCY MEDICINE

## 2025-04-22 PROCEDURE — 93005 ELECTROCARDIOGRAM TRACING: CPT

## 2025-04-22 PROCEDURE — 94640 AIRWAY INHALATION TREATMENT: CPT

## 2025-04-22 PROCEDURE — 83605 ASSAY OF LACTIC ACID: CPT | Performed by: EMERGENCY MEDICINE

## 2025-04-22 PROCEDURE — 80053 COMPREHEN METABOLIC PANEL: CPT | Performed by: EMERGENCY MEDICINE

## 2025-04-22 PROCEDURE — 83880 ASSAY OF NATRIURETIC PEPTIDE: CPT | Performed by: EMERGENCY MEDICINE

## 2025-04-22 PROCEDURE — 36415 COLL VENOUS BLD VENIPUNCTURE: CPT | Performed by: EMERGENCY MEDICINE

## 2025-04-22 PROCEDURE — 96361 HYDRATE IV INFUSION ADD-ON: CPT

## 2025-04-22 PROCEDURE — 87637 SARSCOV2&INF A&B&RSV AMP PRB: CPT | Performed by: EMERGENCY MEDICINE

## 2025-04-22 PROCEDURE — 71275 CT ANGIOGRAPHY CHEST: CPT

## 2025-04-22 PROCEDURE — 93010 ELECTROCARDIOGRAM REPORT: CPT | Performed by: INTERNAL MEDICINE

## 2025-04-22 PROCEDURE — 2500000004 HC RX 250 GENERAL PHARMACY W/ HCPCS (ALT 636 FOR OP/ED): Performed by: EMERGENCY MEDICINE

## 2025-04-22 PROCEDURE — 2500000002 HC RX 250 W HCPCS SELF ADMINISTERED DRUGS (ALT 637 FOR MEDICARE OP, ALT 636 FOR OP/ED): Performed by: EMERGENCY MEDICINE

## 2025-04-22 RX ORDER — IPRATROPIUM BROMIDE AND ALBUTEROL SULFATE 2.5; .5 MG/3ML; MG/3ML
3 SOLUTION RESPIRATORY (INHALATION) ONCE
Status: COMPLETED | OUTPATIENT
Start: 2025-04-22 | End: 2025-04-22

## 2025-04-22 RX ADMIN — IPRATROPIUM BROMIDE AND ALBUTEROL SULFATE 3 ML: 2.5; .5 SOLUTION RESPIRATORY (INHALATION) at 21:58

## 2025-04-22 RX ADMIN — IOHEXOL 75 ML: 350 INJECTION, SOLUTION INTRAVENOUS at 23:15

## 2025-04-22 RX ADMIN — SODIUM CHLORIDE 500 ML: 900 INJECTION, SOLUTION INTRAVENOUS at 22:00

## 2025-04-22 ASSESSMENT — PAIN SCALES - GENERAL: PAINLEVEL_OUTOF10: 5 - MODERATE PAIN

## 2025-04-22 ASSESSMENT — COLUMBIA-SUICIDE SEVERITY RATING SCALE - C-SSRS
6. HAVE YOU EVER DONE ANYTHING, STARTED TO DO ANYTHING, OR PREPARED TO DO ANYTHING TO END YOUR LIFE?: NO
1. IN THE PAST MONTH, HAVE YOU WISHED YOU WERE DEAD OR WISHED YOU COULD GO TO SLEEP AND NOT WAKE UP?: NO
2. HAVE YOU ACTUALLY HAD ANY THOUGHTS OF KILLING YOURSELF?: NO

## 2025-04-22 ASSESSMENT — PAIN DESCRIPTION - LOCATION: LOCATION: HEAD

## 2025-04-22 ASSESSMENT — PAIN - FUNCTIONAL ASSESSMENT: PAIN_FUNCTIONAL_ASSESSMENT: 0-10

## 2025-04-23 ENCOUNTER — HOSPITAL ENCOUNTER (INPATIENT)
Facility: HOSPITAL | Age: 65
LOS: 3 days | Discharge: HOME | End: 2025-04-26
Attending: STUDENT IN AN ORGANIZED HEALTH CARE EDUCATION/TRAINING PROGRAM | Admitting: STUDENT IN AN ORGANIZED HEALTH CARE EDUCATION/TRAINING PROGRAM
Payer: MEDICAID

## 2025-04-23 VITALS
DIASTOLIC BLOOD PRESSURE: 86 MMHG | RESPIRATION RATE: 28 BRPM | TEMPERATURE: 99.4 F | BODY MASS INDEX: 39.17 KG/M2 | HEART RATE: 100 BPM | OXYGEN SATURATION: 93 % | SYSTOLIC BLOOD PRESSURE: 133 MMHG | HEIGHT: 75 IN | WEIGHT: 315 LBS

## 2025-04-23 DIAGNOSIS — J96.01 ACUTE HYPOXIC RESPIRATORY FAILURE: Primary | ICD-10-CM

## 2025-04-23 DIAGNOSIS — J18.9 PNEUMONIA OF RIGHT UPPER LOBE DUE TO INFECTIOUS ORGANISM: ICD-10-CM

## 2025-04-23 PROBLEM — I50.32 HEART FAILURE WITH RECOVERED EJECTION FRACTION (HFRECEF): Status: ACTIVE | Noted: 2025-04-23

## 2025-04-23 PROBLEM — Z86.711 HX PULMONARY EMBOLISM: Status: ACTIVE | Noted: 2025-04-23

## 2025-04-23 LAB
ACID FAST STN SPEC: NORMAL
CARDIAC TROPONIN I PNL SERPL HS: 6 NG/L (ref 0–20)
MYCOBACTERIUM SPEC CULT: NORMAL

## 2025-04-23 PROCEDURE — 2500000004 HC RX 250 GENERAL PHARMACY W/ HCPCS (ALT 636 FOR OP/ED): Mod: JZ | Performed by: STUDENT IN AN ORGANIZED HEALTH CARE EDUCATION/TRAINING PROGRAM

## 2025-04-23 PROCEDURE — 2500000001 HC RX 250 WO HCPCS SELF ADMINISTERED DRUGS (ALT 637 FOR MEDICARE OP): Performed by: STUDENT IN AN ORGANIZED HEALTH CARE EDUCATION/TRAINING PROGRAM

## 2025-04-23 PROCEDURE — 94640 AIRWAY INHALATION TREATMENT: CPT

## 2025-04-23 PROCEDURE — 36415 COLL VENOUS BLD VENIPUNCTURE: CPT

## 2025-04-23 PROCEDURE — 9420000001 HC RT PATIENT EDUCATION 5 MIN

## 2025-04-23 PROCEDURE — 87040 BLOOD CULTURE FOR BACTERIA: CPT | Mod: WESLAB

## 2025-04-23 PROCEDURE — 87070 CULTURE OTHR SPECIMN AEROBIC: CPT | Mod: TRILAB | Performed by: STUDENT IN AN ORGANIZED HEALTH CARE EDUCATION/TRAINING PROGRAM

## 2025-04-23 PROCEDURE — 94664 DEMO&/EVAL PT USE INHALER: CPT

## 2025-04-23 PROCEDURE — 2500000005 HC RX 250 GENERAL PHARMACY W/O HCPCS: Performed by: STUDENT IN AN ORGANIZED HEALTH CARE EDUCATION/TRAINING PROGRAM

## 2025-04-23 PROCEDURE — 99254 IP/OBS CNSLTJ NEW/EST MOD 60: CPT | Performed by: NURSE PRACTITIONER

## 2025-04-23 PROCEDURE — 36415 COLL VENOUS BLD VENIPUNCTURE: CPT | Performed by: NURSE PRACTITIONER

## 2025-04-23 PROCEDURE — 2500000001 HC RX 250 WO HCPCS SELF ADMINISTERED DRUGS (ALT 637 FOR MEDICARE OP): Performed by: REGISTERED NURSE

## 2025-04-23 PROCEDURE — 2500000004 HC RX 250 GENERAL PHARMACY W/ HCPCS (ALT 636 FOR OP/ED): Mod: JZ

## 2025-04-23 PROCEDURE — 99222 1ST HOSP IP/OBS MODERATE 55: CPT | Performed by: STUDENT IN AN ORGANIZED HEALTH CARE EDUCATION/TRAINING PROGRAM

## 2025-04-23 PROCEDURE — 87899 AGENT NOS ASSAY W/OPTIC: CPT | Mod: TRILAB | Performed by: STUDENT IN AN ORGANIZED HEALTH CARE EDUCATION/TRAINING PROGRAM

## 2025-04-23 PROCEDURE — 1200000002 HC GENERAL ROOM WITH TELEMETRY DAILY

## 2025-04-23 PROCEDURE — 99232 SBSQ HOSP IP/OBS MODERATE 35: CPT | Performed by: NURSE PRACTITIONER

## 2025-04-23 PROCEDURE — 86481 TB AG RESPONSE T-CELL SUSP: CPT | Performed by: NURSE PRACTITIONER

## 2025-04-23 PROCEDURE — 96365 THER/PROPH/DIAG IV INF INIT: CPT

## 2025-04-23 PROCEDURE — 2500000002 HC RX 250 W HCPCS SELF ADMINISTERED DRUGS (ALT 637 FOR MEDICARE OP, ALT 636 FOR OP/ED): Performed by: STUDENT IN AN ORGANIZED HEALTH CARE EDUCATION/TRAINING PROGRAM

## 2025-04-23 PROCEDURE — 87449 NOS EACH ORGANISM AG IA: CPT | Mod: TRILAB | Performed by: STUDENT IN AN ORGANIZED HEALTH CARE EDUCATION/TRAINING PROGRAM

## 2025-04-23 RX ORDER — CEFTRIAXONE 1 G/50ML
1 INJECTION, SOLUTION INTRAVENOUS ONCE
Status: COMPLETED | OUTPATIENT
Start: 2025-04-23 | End: 2025-04-23

## 2025-04-23 RX ORDER — IPRATROPIUM BROMIDE AND ALBUTEROL SULFATE 2.5; .5 MG/3ML; MG/3ML
3 SOLUTION RESPIRATORY (INHALATION)
Status: DISCONTINUED | OUTPATIENT
Start: 2025-04-23 | End: 2025-04-23

## 2025-04-23 RX ORDER — BUPROPION HYDROCHLORIDE 300 MG/1
300 TABLET ORAL DAILY
Status: DISCONTINUED | OUTPATIENT
Start: 2025-04-23 | End: 2025-04-26 | Stop reason: HOSPADM

## 2025-04-23 RX ORDER — ACETAMINOPHEN 325 MG/1
650 TABLET ORAL EVERY 6 HOURS PRN
Status: DISCONTINUED | OUTPATIENT
Start: 2025-04-23 | End: 2025-04-26 | Stop reason: HOSPADM

## 2025-04-23 RX ORDER — IPRATROPIUM BROMIDE AND ALBUTEROL SULFATE 2.5; .5 MG/3ML; MG/3ML
3 SOLUTION RESPIRATORY (INHALATION) EVERY 6 HOURS PRN
Status: DISCONTINUED | OUTPATIENT
Start: 2025-04-23 | End: 2025-04-26 | Stop reason: HOSPADM

## 2025-04-23 RX ORDER — DULOXETIN HYDROCHLORIDE 30 MG/1
30 CAPSULE, DELAYED RELEASE ORAL 2 TIMES DAILY
Status: DISCONTINUED | OUTPATIENT
Start: 2025-04-23 | End: 2025-04-26 | Stop reason: HOSPADM

## 2025-04-23 RX ORDER — ASPIRIN 81 MG/1
81 TABLET ORAL DAILY
Status: DISCONTINUED | OUTPATIENT
Start: 2025-04-23 | End: 2025-04-26 | Stop reason: HOSPADM

## 2025-04-23 RX ORDER — PRIMIDONE 50 MG/1
250 TABLET ORAL NIGHTLY
Status: DISCONTINUED | OUTPATIENT
Start: 2025-04-23 | End: 2025-04-26 | Stop reason: HOSPADM

## 2025-04-23 RX ORDER — CEFTRIAXONE 1 G/50ML
1 INJECTION, SOLUTION INTRAVENOUS EVERY 24 HOURS
Status: DISCONTINUED | OUTPATIENT
Start: 2025-04-23 | End: 2025-04-26 | Stop reason: HOSPADM

## 2025-04-23 RX ORDER — METOPROLOL SUCCINATE 25 MG/1
25 TABLET, EXTENDED RELEASE ORAL EVERY EVENING
Status: DISCONTINUED | OUTPATIENT
Start: 2025-04-23 | End: 2025-04-26 | Stop reason: HOSPADM

## 2025-04-23 RX ORDER — IPRATROPIUM BROMIDE AND ALBUTEROL SULFATE 2.5; .5 MG/3ML; MG/3ML
3 SOLUTION RESPIRATORY (INHALATION)
Status: DISCONTINUED | OUTPATIENT
Start: 2025-04-23 | End: 2025-04-26 | Stop reason: HOSPADM

## 2025-04-23 RX ADMIN — AZITHROMYCIN MONOHYDRATE 250 MG: 500 INJECTION, POWDER, LYOPHILIZED, FOR SOLUTION INTRAVENOUS at 21:46

## 2025-04-23 RX ADMIN — APIXABAN 5 MG: 5 TABLET, FILM COATED ORAL at 17:20

## 2025-04-23 RX ADMIN — APIXABAN 5 MG: 5 TABLET, FILM COATED ORAL at 06:07

## 2025-04-23 RX ADMIN — CEFTRIAXONE SODIUM 1 G: 1 INJECTION, SOLUTION INTRAVENOUS at 02:26

## 2025-04-23 RX ADMIN — SACUBITRIL AND VALSARTAN 1 TABLET: 24; 26 TABLET, FILM COATED ORAL at 20:44

## 2025-04-23 RX ADMIN — METOPROLOL SUCCINATE 25 MG: 25 TABLET, EXTENDED RELEASE ORAL at 20:42

## 2025-04-23 RX ADMIN — CEFTRIAXONE SODIUM 1 G: 1 INJECTION, SOLUTION INTRAVENOUS at 21:46

## 2025-04-23 RX ADMIN — PRIMIDONE 250 MG: 50 TABLET ORAL at 20:42

## 2025-04-23 RX ADMIN — Medication 2 L/MIN: at 19:06

## 2025-04-23 RX ADMIN — IPRATROPIUM BROMIDE AND ALBUTEROL SULFATE 3 ML: 2.5; .5 SOLUTION RESPIRATORY (INHALATION) at 12:12

## 2025-04-23 RX ADMIN — Medication 2 L/MIN: at 19:08

## 2025-04-23 RX ADMIN — ASPIRIN 81 MG: 81 TABLET, COATED ORAL at 09:02

## 2025-04-23 RX ADMIN — DULOXETINE HYDROCHLORIDE 30 MG: 30 CAPSULE, DELAYED RELEASE ORAL at 20:42

## 2025-04-23 RX ADMIN — DULOXETINE HYDROCHLORIDE 30 MG: 30 CAPSULE, DELAYED RELEASE ORAL at 09:02

## 2025-04-23 RX ADMIN — IPRATROPIUM BROMIDE AND ALBUTEROL SULFATE 3 ML: 2.5; .5 SOLUTION RESPIRATORY (INHALATION) at 07:53

## 2025-04-23 RX ADMIN — BUPROPION HYDROCHLORIDE 300 MG: 300 TABLET, EXTENDED RELEASE ORAL at 09:02

## 2025-04-23 RX ADMIN — ACETAMINOPHEN 650 MG: 325 TABLET, FILM COATED ORAL at 20:41

## 2025-04-23 RX ADMIN — IPRATROPIUM BROMIDE AND ALBUTEROL SULFATE 3 ML: 2.5; .5 SOLUTION RESPIRATORY (INHALATION) at 19:03

## 2025-04-23 RX ADMIN — DEXTROSE MONOHYDRATE 500 MG: 50 INJECTION, SOLUTION INTRAVENOUS at 03:08

## 2025-04-23 SDOH — HEALTH STABILITY: PHYSICAL HEALTH: ON AVERAGE, HOW MANY DAYS PER WEEK DO YOU ENGAGE IN MODERATE TO STRENUOUS EXERCISE (LIKE A BRISK WALK)?: 0 DAYS

## 2025-04-23 SDOH — SOCIAL STABILITY: SOCIAL INSECURITY: ARE YOU MARRIED, WIDOWED, DIVORCED, SEPARATED, NEVER MARRIED, OR LIVING WITH A PARTNER?: NEVER MARRIED

## 2025-04-23 SDOH — SOCIAL STABILITY: SOCIAL NETWORK: IN A TYPICAL WEEK, HOW MANY TIMES DO YOU TALK ON THE PHONE WITH FAMILY, FRIENDS, OR NEIGHBORS?: TWICE A WEEK

## 2025-04-23 SDOH — SOCIAL STABILITY: SOCIAL NETWORK
DO YOU BELONG TO ANY CLUBS OR ORGANIZATIONS SUCH AS CHURCH GROUPS, UNIONS, FRATERNAL OR ATHLETIC GROUPS, OR SCHOOL GROUPS?: NO

## 2025-04-23 SDOH — ECONOMIC STABILITY: INCOME INSECURITY: IN THE PAST 12 MONTHS HAS THE ELECTRIC, GAS, OIL, OR WATER COMPANY THREATENED TO SHUT OFF SERVICES IN YOUR HOME?: YES

## 2025-04-23 SDOH — SOCIAL STABILITY: SOCIAL INSECURITY: WITHIN THE LAST YEAR, HAVE YOU BEEN AFRAID OF YOUR PARTNER OR EX-PARTNER?: NO

## 2025-04-23 SDOH — HEALTH STABILITY: PHYSICAL HEALTH: ON AVERAGE, HOW MANY MINUTES DO YOU ENGAGE IN EXERCISE AT THIS LEVEL?: 0 MIN

## 2025-04-23 SDOH — SOCIAL STABILITY: SOCIAL NETWORK: HOW OFTEN DO YOU ATTEND CHURCH OR RELIGIOUS SERVICES?: 1 TO 4 TIMES PER YEAR

## 2025-04-23 SDOH — HEALTH STABILITY: PHYSICAL HEALTH
HOW OFTEN DO YOU NEED TO HAVE SOMEONE HELP YOU WHEN YOU READ INSTRUCTIONS, PAMPHLETS, OR OTHER WRITTEN MATERIAL FROM YOUR DOCTOR OR PHARMACY?: NEVER

## 2025-04-23 SDOH — SOCIAL STABILITY: SOCIAL INSECURITY: WITHIN THE LAST YEAR, HAVE YOU BEEN HUMILIATED OR EMOTIONALLY ABUSED IN OTHER WAYS BY YOUR PARTNER OR EX-PARTNER?: NO

## 2025-04-23 SDOH — HEALTH STABILITY: MENTAL HEALTH
DO YOU FEEL STRESS - TENSE, RESTLESS, NERVOUS, OR ANXIOUS, OR UNABLE TO SLEEP AT NIGHT BECAUSE YOUR MIND IS TROUBLED ALL THE TIME - THESE DAYS?: ONLY A LITTLE

## 2025-04-23 SDOH — ECONOMIC STABILITY: FOOD INSECURITY
WITHIN THE PAST 12 MONTHS, YOU WORRIED THAT YOUR FOOD WOULD RUN OUT BEFORE YOU GOT THE MONEY TO BUY MORE.: SOMETIMES TRUE

## 2025-04-23 SDOH — SOCIAL STABILITY: SOCIAL INSECURITY: HAVE YOU HAD ANY THOUGHTS OF HARMING ANYONE ELSE?: NO

## 2025-04-23 SDOH — SOCIAL STABILITY: SOCIAL INSECURITY: HAS ANYONE EVER THREATENED TO HURT YOUR FAMILY OR YOUR PETS?: NO

## 2025-04-23 SDOH — SOCIAL STABILITY: SOCIAL INSECURITY: ARE YOU OR HAVE YOU BEEN THREATENED OR ABUSED PHYSICALLY, EMOTIONALLY, OR SEXUALLY BY ANYONE?: NO

## 2025-04-23 SDOH — SOCIAL STABILITY: SOCIAL INSECURITY: HAVE YOU HAD THOUGHTS OF HARMING ANYONE ELSE?: NO

## 2025-04-23 SDOH — SOCIAL STABILITY: SOCIAL NETWORK: HOW OFTEN DO YOU GET TOGETHER WITH FRIENDS OR RELATIVES?: TWICE A WEEK

## 2025-04-23 SDOH — SOCIAL STABILITY: SOCIAL INSECURITY: DO YOU FEEL UNSAFE GOING BACK TO THE PLACE WHERE YOU ARE LIVING?: NO

## 2025-04-23 SDOH — SOCIAL STABILITY: SOCIAL INSECURITY: ABUSE: ADULT

## 2025-04-23 SDOH — SOCIAL STABILITY: SOCIAL INSECURITY: ARE THERE ANY APPARENT SIGNS OF INJURIES/BEHAVIORS THAT COULD BE RELATED TO ABUSE/NEGLECT?: NO

## 2025-04-23 SDOH — ECONOMIC STABILITY: FOOD INSECURITY: WITHIN THE PAST 12 MONTHS, THE FOOD YOU BOUGHT JUST DIDN'T LAST AND YOU DIDN'T HAVE MONEY TO GET MORE.: SOMETIMES TRUE

## 2025-04-23 SDOH — SOCIAL STABILITY: SOCIAL INSECURITY: DOES ANYONE TRY TO KEEP YOU FROM HAVING/CONTACTING OTHER FRIENDS OR DOING THINGS OUTSIDE YOUR HOME?: NO

## 2025-04-23 SDOH — SOCIAL STABILITY: SOCIAL NETWORK: HOW OFTEN DO YOU ATTEND MEETINGS OF THE CLUBS OR ORGANIZATIONS YOU BELONG TO?: NEVER

## 2025-04-23 SDOH — SOCIAL STABILITY: SOCIAL INSECURITY: DO YOU FEEL ANYONE HAS EXPLOITED OR TAKEN ADVANTAGE OF YOU FINANCIALLY OR OF YOUR PERSONAL PROPERTY?: NO

## 2025-04-23 SDOH — SOCIAL STABILITY: SOCIAL INSECURITY: WERE YOU ABLE TO COMPLETE ALL THE BEHAVIORAL HEALTH SCREENINGS?: YES

## 2025-04-23 ASSESSMENT — ENCOUNTER SYMPTOMS
SHORTNESS OF BREATH: 1
CHILLS: 1
ALLERGIC/IMMUNOLOGIC NEGATIVE: 1
MUSCULOSKELETAL NEGATIVE: 1
HEMATOLOGIC/LYMPHATIC NEGATIVE: 1
ENDOCRINE NEGATIVE: 1
FATIGUE: 1
NEUROLOGICAL NEGATIVE: 1
PSYCHIATRIC NEGATIVE: 1
GASTROINTESTINAL NEGATIVE: 1
COUGH: 1
EYES NEGATIVE: 1
FEVER: 1
CARDIOVASCULAR NEGATIVE: 1

## 2025-04-23 ASSESSMENT — ACTIVITIES OF DAILY LIVING (ADL)
BATHING: INDEPENDENT
WALKS IN HOME: INDEPENDENT
LACK_OF_TRANSPORTATION: YES
HEARING - RIGHT EAR: FUNCTIONAL
ADEQUATE_TO_COMPLETE_ADL: YES
LACK_OF_TRANSPORTATION: NO
ASSISTIVE_DEVICE: EYEGLASSES
GROOMING: INDEPENDENT
TOILETING: INDEPENDENT
JUDGMENT_ADEQUATE_SAFELY_COMPLETE_DAILY_ACTIVITIES: YES
FEEDING YOURSELF: INDEPENDENT
PATIENT'S MEMORY ADEQUATE TO SAFELY COMPLETE DAILY ACTIVITIES?: YES
DRESSING YOURSELF: INDEPENDENT
HEARING - LEFT EAR: FUNCTIONAL

## 2025-04-23 ASSESSMENT — PAIN - FUNCTIONAL ASSESSMENT
PAIN_FUNCTIONAL_ASSESSMENT: 0-10

## 2025-04-23 ASSESSMENT — COGNITIVE AND FUNCTIONAL STATUS - GENERAL
MOBILITY SCORE: 24
MOBILITY SCORE: 24
DAILY ACTIVITIY SCORE: 24
DAILY ACTIVITIY SCORE: 24
PATIENT BASELINE BEDBOUND: NO

## 2025-04-23 ASSESSMENT — COLUMBIA-SUICIDE SEVERITY RATING SCALE - C-SSRS
6. HAVE YOU EVER DONE ANYTHING, STARTED TO DO ANYTHING, OR PREPARED TO DO ANYTHING TO END YOUR LIFE?: NO
2. HAVE YOU ACTUALLY HAD ANY THOUGHTS OF KILLING YOURSELF?: NO
1. IN THE PAST MONTH, HAVE YOU WISHED YOU WERE DEAD OR WISHED YOU COULD GO TO SLEEP AND NOT WAKE UP?: NO

## 2025-04-23 ASSESSMENT — LIFESTYLE VARIABLES
HOW OFTEN DO YOU HAVE 6 OR MORE DRINKS ON ONE OCCASION: NEVER
PRESCIPTION_ABUSE_PAST_12_MONTHS: NO
HOW MANY STANDARD DRINKS CONTAINING ALCOHOL DO YOU HAVE ON A TYPICAL DAY: PATIENT DOES NOT DRINK
SKIP TO QUESTIONS 9-10: 1
SUBSTANCE_ABUSE_PAST_12_MONTHS: NO
HOW OFTEN DO YOU HAVE A DRINK CONTAINING ALCOHOL: NEVER
AUDIT-C TOTAL SCORE: 0
AUDIT-C TOTAL SCORE: 0

## 2025-04-23 ASSESSMENT — PATIENT HEALTH QUESTIONNAIRE - PHQ9
SUM OF ALL RESPONSES TO PHQ9 QUESTIONS 1 & 2: 0
2. FEELING DOWN, DEPRESSED OR HOPELESS: NOT AT ALL
1. LITTLE INTEREST OR PLEASURE IN DOING THINGS: NOT AT ALL

## 2025-04-23 ASSESSMENT — PAIN SCALES - GENERAL
PAINLEVEL_OUTOF10: 0 - NO PAIN
PAINLEVEL_OUTOF10: 5 - MODERATE PAIN

## 2025-04-23 ASSESSMENT — PAIN DESCRIPTION - LOCATION: LOCATION: HEAD

## 2025-04-23 NOTE — ASSESSMENT & PLAN NOTE
-echos reviewed. EF 35-40% in 2024 and recovered to 50-55% in 3/2025  -follows with Dr Boss. Not in exacerbation  -continue metop and entresto, hold SGLT2i while inpatient

## 2025-04-23 NOTE — ASSESSMENT & PLAN NOTE
-suspected secondary to a developing pneumonia. He is not in CHF exacerbation and prior pulmonary emboli areas are improving  -antibiotics, nebulizer tx, supportive care, wean O2 as able

## 2025-04-23 NOTE — ASSESSMENT & PLAN NOTE
-Being followed by his PCP outpatient, PTH fairly stable from previous year to January of this year

## 2025-04-23 NOTE — PROGRESS NOTES
Attestation/Supervisory note for AUGUSTINE Juares      The patient is a 64-year-old male presenting to the emergency department for evaluation of shortness of breath, dyspnea on exertion, nonproductive cough, generalized malaise and fatigue and palpitations.  The patient states that he started having symptoms last night and it has been gradually worsening.  He states he was diagnosed with multiple pulmonary emboli on 24 March 2025.  He states he was started on Eliquis afterward.  He states that they do not know why he had the blood clots.  He states that they initially thought they were coming from his legs but he had ultrasounds performed and he did not have any evidence of DVT.  He states he does have a history of CHF.  He denies a history of previous heart attack.  He denies any recent travel or immobility.  No sick contacts or recent travel.  He states that he quit smoking many years ago.  He does not use supplemental oxygen at home.  He denies any headache or visual changes.  No chest pain.  No fever or chills.  No abdominal pain.  No nausea or vomiting.  No diarrhea or constipation.  No urinary complaints.  All pertinent positives and negatives are recorded above.  All other systems reviewed and otherwise negative.  Vital signs with diastolic hypertension, tachycardia and borderline hypoxia but otherwise within normal limits.  Physical exam with well-nourished well-developed male in no acute distress.  HEENT exam with dry mucous membranes but otherwise unremarkable.  He does have some coarse breath sounds bilaterally but he does not have any other evidence of airway compromise or respiratory distress.  Abdominal exam is benign.  He does not have any gross motor, neurologic or vascular deficits on exam.  Pulses are equal bilaterally.      EKG with sinus tachycardia 126 bpm, LVH criteria, normal axis, normal voltage, normal ST segment, normal T waves      DuoNeb and IV fluids ordered.      Diagnostic labs results were  pending at the time of my departure.      CT angio chest was ordered but was pending at the time of my departure.      AUGUSTINE Juares will continue to manage the patient primarily.  Anticipate disposition based on the results of the diagnostic labs and CT angio chest.        Impression/diagnosis:  Dyspnea, dyspnea on exertion  Cough and congestion  Hypertension, unspecified      I personally saw the patient and made/approve the management plan and take responsibility for the patient management.      I independently interpreted the following study (S) EKG and diagnostic labs      I personally discussed the patient's management with the patient      I reviewed the results of the diagnostic labs and diagnostic imaging.  Formal radiology read was completed by the radiologist.      Sandra Estrada MD

## 2025-04-23 NOTE — ED PROVIDER NOTES
HPI   Chief Complaint   Patient presents with    Shortness of Breath       Patient is a 64-year-old male presents emergency department for evaluation of cough congestion and shortness of breath.  Patient states that since yesterday he has been feeling rundown and fatigued with some palpitations and pain in his chest.  He states that he feels somewhat short of breath and has a lot of mucus production.  He is concerned he may be coming down with something.  He states he was recently in the hospital and started on blood thinning medication Eliquis for history of pulmonary embolus which he has been taking without complication.  He notes he has a history of heart failure and follows with cardiology Dr. Boss, but denies any history of stents or bypass with history of nonischemic cardiomyopathy.  He has felt somewhat feverish and has had increasing shortness of breath.  He denies any lightheadedness, abdominal pain, nausea, vomiting..      History provided by:  Patient   used: No            Patient History   Medical History[1]  Surgical History[2]  Family History[3]  Social History[4]    Physical Exam   ED Triage Vitals [04/22/25 2049]   Temperature Heart Rate Respirations BP   36.7 °C (98.1 °F) (!) 140 16 (!) 131/100      Pulse Ox Temp Source Heart Rate Source Patient Position   (!) 93 % Temporal -- --      BP Location FiO2 (%)     -- --       Physical Exam  Constitutional:       Appearance: He is well-developed.   Cardiovascular:      Rate and Rhythm: Regular rhythm. Tachycardia present.   Pulmonary:      Effort: Pulmonary effort is normal.      Breath sounds: Examination of the right-lower field reveals decreased breath sounds. Examination of the left-lower field reveals decreased breath sounds. Decreased breath sounds and rhonchi present.   Abdominal:      General: Bowel sounds are normal.      Palpations: Abdomen is soft.      Tenderness: There is no abdominal tenderness.   Musculoskeletal:          General: Normal range of motion.   Skin:     General: Skin is warm and dry.   Neurological:      General: No focal deficit present.      Mental Status: He is alert and oriented to person, place, and time.           ED Course & MDM   Diagnoses as of 04/24/25 0219   Shortness of breath   Acute cough   Diastolic hypertension                 No data recorded     [place] Coma Scale Score: 15 (04/22/25 2222 : Ana Braxton RN)                           Medical Decision Making  Patient is a 64-year-old male presents emergency department for evaluation of shortness of breath chest pain cough congestion since yesterday.    EKG was interpreted by attending physician and reviewed by me.    Lab work done today included CMP, CBC, troponins, proBNP, lactic acid, collection of blood cultures, and RSV/flu/COVID swabs.  Lab work with troponins of minimal range with renal insufficiency and otherwise unremarkable.    Scans done today were interpreted/confirmed by radiologist and also interpreted by me which included CT angio chest for PE.  CT angio chest shows limited examination due to suboptimal contrast bolus timing with preferential enhancement of pulmonary venous system instead of the pulmonary arterial system within limits of exam there is suggestion of faint linear filling defects within the lobar arteries supplying the right middle lobe and right lower lobe likely representing resolving pulmonary emboli previously visualized on 3/4/2025 with no new large saddle central embolism present in the interim since prior exam with subtle new patchy airspace opacities present in the inferior aspect of the right upper lobe in the interim since prior study suggestive of developing infiltrate pneumonia with somewhat irregular shaped nodule in the right upper lobe similar in appearance to prior exam with several nonspecific enlarged mediastinal lymph nodes similar to prior study.    Medications given at today's visit include IV  fluids, IV ceftriaxone, IV azithromycin, DuoNeb    I saw this patient in conjunction with Dr. Estrada.  Patient remained stable in the emergency department, but persist to be significantly tachycardic and intermittently tachypneic.  On ambulation to the bathroom he becomes hypoxic at 88% on room air and therefore placed on 2 L nasal cannula oxygen for comfort.  Given his tachycardia in the setting of concern for underlying infection and sepsis bundle was ordered including blood cultures and lactic acid and patient started on ceftriaxone and azithromycin.  Full 30 mL/kg fluid bolus not warranted at this time and only 500 mL fluid given especially in the setting of patient with history of heart failure.  He was found to have evidence on CT angio resolving PEs which patient is currently on Eliquis for but developing infiltrated pneumonia in the right upper lobe.  Given this in the setting of consistent tachycardia meeting SIRS criteria with new hypoxia with exertion patient will be admitted for further care.  Due to bed capacity here at [facility], hospitalist at [facility] was contacted.  Ultimately discussion with [facility] hospitalist pending at time of my departure.  Ultimately anticipate patient will be accepted and transferred to [facility] for continued management of pneumonia with hypoxia meeting SIRS criteria.  Patient care was ongoing at time of my departure. Continuation of care and final disposition will by managed by attending physician Dr. Rasmussen. We discussed the pertinent history, physical exam, completed/pending test results (if applicable) and current treatment plan. Please refer to his/her chart for the patients remaining Emergency Department course and final disposition.    ** Disclaimer:  Parts of this document were written utilizing a voice to text dictation software.  Note may contain minor transcription or typographical errors that were inadvertently  transcribed by the computer software.        Procedure  Procedures         [1]   Past Medical History:  Diagnosis Date    ADHD (attention deficit hyperactivity disorder)     Allergic     Anxiety     Arrhythmia     Arthritis     CHF (congestive heart failure) 10/22    Depression     Eczema During     Fracture of lumbar spine (Multi)     H/O supraventricular tachycardia     Headache     Hyperlipidemia     Hypertension     Lung nodule     Obesity     Varicella 1964    Visual impairment    [2]   Past Surgical History:  Procedure Laterality Date    CARDIAC CATHETERIZATION      CARDIAC ELECTROPHYSIOLOGY STUDY AND ABLATION      CIRCUMCISION, PRIMARY  1960    MR HEAD ANGIO WO IV CONTRAST  2017    MR HEAD ANGIO WO IV CONTRAST 2017 [facility]    MR NECK ANGIO WO IV CONTRAST  2017    MR NECK ANGIO WO IV CONTRAST 2017 [facility]    OTHER SURGICAL HISTORY  2022    No history of surgery    WISDOM TOOTH EXTRACTION     [3]   Family History  Problem Relation Name Age of Onset    Coronary artery disease Father Fernando Lottvyn Yoliect     Heart disease Father Fernando Lottvyn Lico     Coronary artery disease Brother Fidel Fernando Barfield     Drug abuse Brother Fidel Fernando Barfield     Arthritis Brother Fidel Fernando Barfield     Depression Mother Betsy Lawton Lico     Hypertension Mother Betsy Lawton Lico     Mental illness Mother Betsy Duartevictoria    [4]   Social History  Tobacco Use    Smoking status: Former     Current packs/day: 0.00     Average packs/day: 3.0 packs/day for 39.4 years (118.1 ttl pk-yrs)     Types: Cigarettes     Start date: 1969     Quit date:      Years since quittin.3     Passive exposure: Past    Smokeless tobacco: Never   Vaping Use    Vaping status: Never Used   Substance Use Topics    Alcohol use: Not Currently     Comment: Quit in     Drug use: Never        Spring Juares PA-C  25 0219

## 2025-04-23 NOTE — PROGRESS NOTES
Brandin Barfield is a 64 y.o. male on day 0 of admission presenting with Acute hypoxic respiratory failure.      Subjective   Patient seen and examined. Patient feeling tired as he was not able to sleep last night. Denies fever or chills, currently denies SOB but dyspneic with activity.        Objective     Last Recorded Vitals  BP 93/62 (BP Location: Right arm, Patient Position: Lying)   Pulse 99   Temp 36.7 °C (98.1 °F) (Temporal)   Resp 22   Wt 140 kg (307 lb 8.7 oz)   SpO2 95%   Intake/Output last 3 Shifts:  No intake or output data in the 24 hours ending 04/23/25 1001    Admission Weight  Weight: 140 kg (307 lb 8.7 oz) (04/23/25 0527)    Daily Weight  04/23/25 : 140 kg (307 lb 8.7 oz)    Image Results  ECG 12 Lead  Sinus tachycardia  Left ventricular hypertrophy with repolarization abnormality ( R in aVL )  Cannot rule out Septal infarct , age undetermined  Abnormal ECG  When compared with ECG of 04-MAR-2025 11:29,  ST now depressed in Lateral leads  T wave inversion no longer evident in Inferior leads  T wave inversion no longer evident in Lateral leads  CT angio chest for pulmonary embolism  Narrative: Interpreted By:  Ema Figueroa,   STUDY:  CT ANGIO CHEST FOR PULMONARY EMBOLISM;  4/22/2025 11:14 pm      INDICATION:  Signs/Symptoms:Dyspnea, tachycardia, history of PE.          COMPARISON:  CT PE dated 03/04/2025;      ACCESSION NUMBER(S):  [id number]      ORDERING CLINICIAN:  DEYANIRA ROSENTHAL      TECHNIQUE:  Helical data acquisition of the chest was obtained after intravenous  administration of 75 ML Omnipaque 350, as per PE protocol. Images  were reformatted in coronal and sagittal planes. Axial and coronal  maximum intensity projection (MIP) images were created and reviewed.      FINDINGS:  POTENTIAL LIMITATIONS OF THE STUDY: Images are somewhat limited by  suboptimal contrast bolus timing, with preferential enhancement of  the systemic arterial system instead of the pulmonary arterial system.       HEART AND VESSELS:  Limited evaluation due to suboptimal contrast bolus timing with  preferential enhancement of the pulmonary venous instead of the  pulmonary arterial system. Within limits of the study, there are  subtle filling defects partially visualized in the lobar artery  supplying the right middle and right lower lobe, likely representing  resolving emboli seen on previous exam on 03/04/2025. No new large  central embolism is identified. Main pulmonary artery and its  branches are normal in caliber.      The thoracic aorta normal in course and caliber.Minimal vascular  calcifications are present in the thoracic aorta.Although, the study  is not tailored for evaluation of aorta, there is no definite  evidence of acute aortic pathology. Moderate coronary artery  calcifications are seen. Please note,the study is not optimized for  evaluation of coronary arteries.      The cardiac chambers are not enlarged.There are no findings to  suggest right heart strain. There is no pericardial effusion seen.      MEDIASTINUM AND OSORIO, LOWER NECK AND AXILLA:  The visualized thyroid gland is within normal limits.  Several mildly enlarged mediastinal lymph nodes are present,  measuring up to 1.4 cm in size in the right paratracheal space, up to  1.7 cm in size in the subcarinal space, and up to 1.7 cm in size in  the right hilum. These are similar to prior study on 03/04/2025.  Esophagus appears within normal limits as seen.      LUNGS AND AIRWAYS:  The trachea and central airways are patent. No endobronchial lesion  is seen.      There is redemonstration of the a 1.5 cm somewhat irregular shaped  nodule in the right upper lobe (series 9, image 96), similar in size  to prior exam, with new patchy nodular airspace opacity is present  slightly more inferior in the right upper lobe (series 9, image 130)  compared to prior exam on 03/04/2025.      Mild atelectatic changes are present in the lower lobes bilaterally  without  evidence of consolidation or pleural effusion. No  pneumothorax is present.      UPPER ABDOMEN:  The visualized subdiaphragmatic structures demonstrate no remarkable  findings.      CHEST WALL AND OSSEOUS STRUCTURES:  Chest wall is within normal limits.  No acute osseous pathology.There are no suspicious osseous  lesions.Chronic compression deformity along superior endplate of L1  is similar to prior exam. Chronic compression deformity along  superior endplate of T4 is also similar to prior study.      No acute abnormalities identified in the thoracic spine.      Impression: 1. Limited examination due to suboptimal contrast bolus timing, with  preferential enhancement of the pulmonary venous system instead of  the pulmonary arterial system. Within limits of the exam, there is  suggestion of the faint linear filling defects present within the  lobar arteries supplying the right middle and right lower lobe,  likely representing resolving pulmonary emboli previously visualized  on 03/04/2025. No new large central saddle embolism is present in the  interim since prior exam.  2. Subtle new patchy airspace opacities are present in the inferior  aspect of the right upper lobe in the interim since prior study on  03/04/2025, suggestive of developing infiltrate/pneumonia.  3. A 1.5 cm somewhat irregular shaped solid nodule in the right upper  lobe is similar in appearance to prior exam.  4. Several nonspecific enlarged mediastinal lymph nodes are similar  to prior study, including subcarinal and right paratracheal lymph  node, which measures up to 1.7 cm in short axis dimension.      MACRO:  None      Signed by: Ema Figueroa 4/23/2025 1:13 AM  Dictation workstation:   [id number]      Physical Exam  Vitals reviewed.   Constitutional:       Appearance: He is obese. He is ill-appearing.   HENT:      Head: Normocephalic.      Nose: Nose normal.      Mouth/Throat:      Mouth: Mucous membranes are moist.   Eyes:       Extraocular Movements: Extraocular movements intact.   Cardiovascular:      Rate and Rhythm: Regular rhythm.   Pulmonary:      Effort: Pulmonary effort is normal.   Abdominal:      General: Bowel sounds are normal.   Musculoskeletal:         General: Normal range of motion.      Cervical back: Neck supple.   Skin:     General: Skin is warm.   Neurological:      Mental Status: He is alert and oriented to person, place, and time.         Relevant Results      Results for orders placed or performed during the hospital encounter of 04/22/25 (from the past 24 hours)   Sars-CoV-2, Influenza A/B and RSV PCR   Result Value Ref Range    Coronavirus 2019, PCR Not Detected Not Detected    Flu A Result Not Detected Not Detected    Flu B Result Not Detected Not Detected    RSV PCR Not Detected Not Detected   CBC and Auto Differential   Result Value Ref Range    WBC 10.6 4.4 - 11.3 x10*3/uL    nRBC 0.0 0.0 - 0.0 /100 WBCs    RBC 5.44 4.50 - 5.90 x10*6/uL    Hemoglobin 15.8 13.5 - 17.5 g/dL    Hematocrit 47.1 41.0 - 52.0 %    MCV 87 80 - 100 fL    MCH 29.0 26.0 - 34.0 pg    MCHC 33.5 32.0 - 36.0 g/dL    RDW 13.7 11.5 - 14.5 %    Platelets 226 150 - 450 x10*3/uL    Neutrophils % 77.0 40.0 - 80.0 %    Immature Granulocytes %, Automated 0.3 0.0 - 0.9 %    Lymphocytes % 11.1 13.0 - 44.0 %    Monocytes % 7.9 2.0 - 10.0 %    Eosinophils % 3.1 0.0 - 6.0 %    Basophils % 0.6 0.0 - 2.0 %    Neutrophils Absolute 8.17 (H) 1.20 - 7.70 x10*3/uL    Immature Granulocytes Absolute, Automated 0.03 0.00 - 0.70 x10*3/uL    Lymphocytes Absolute 1.18 (L) 1.20 - 4.80 x10*3/uL    Monocytes Absolute 0.84 0.10 - 1.00 x10*3/uL    Eosinophils Absolute 0.33 0.00 - 0.70 x10*3/uL    Basophils Absolute 0.06 0.00 - 0.10 x10*3/uL   Comprehensive metabolic panel   Result Value Ref Range    Glucose 90 74 - 99 mg/dL    Sodium 137 136 - 145 mmol/L    Potassium 4.3 3.5 - 5.3 mmol/L    Chloride 105 98 - 107 mmol/L    Bicarbonate 26 21 - 32 mmol/L    Anion Gap 10 10 -  20 mmol/L    Urea Nitrogen 23 6 - 23 mg/dL    Creatinine 1.38 (H) 0.50 - 1.30 mg/dL    eGFR 57 (L) >60 mL/min/1.73m*2    Calcium 10.8 (H) 8.6 - 10.3 mg/dL    Albumin 4.1 3.4 - 5.0 g/dL    Alkaline Phosphatase 71 33 - 136 U/L    Total Protein 7.5 6.4 - 8.2 g/dL    AST 14 9 - 39 U/L    Bilirubin, Total 0.4 0.0 - 1.2 mg/dL    ALT 17 10 - 52 U/L   B-Type Natriuretic Peptide   Result Value Ref Range    BNP 25 0 - 99 pg/mL   Lactate   Result Value Ref Range    Lactate 1.5 0.4 - 2.0 mmol/L   Troponin I, High Sensitivity, Initial   Result Value Ref Range    Troponin I, High Sensitivity 7 0 - 20 ng/L   Troponin, High Sensitivity, 1 Hour   Result Value Ref Range    Troponin I, High Sensitivity 6 0 - 20 ng/L   Blood Culture    Specimen: Peripheral Venipuncture; Blood culture   Result Value Ref Range    Blood Culture Loaded on Instrument - Culture in progress    Blood Culture    Specimen: Peripheral Venipuncture; Blood culture   Result Value Ref Range    Blood Culture Loaded on Instrument - Culture in progress     ECG 12 Lead  Result Date: 4/23/2025  Sinus tachycardia Left ventricular hypertrophy with repolarization abnormality ( R in aVL ) Cannot rule out Septal infarct , age undetermined Abnormal ECG When compared with ECG of 04-MAR-2025 11:29, ST now depressed in Lateral leads T wave inversion no longer evident in Inferior leads T wave inversion no longer evident in Lateral leads    CT angio chest for pulmonary embolism  Result Date: 4/23/2025  Interpreted By:  Ema Figueroa, STUDY: CT ANGIO CHEST FOR PULMONARY EMBOLISM;  4/22/2025 11:14 pm   INDICATION: Signs/Symptoms:Dyspnea, tachycardia, history of PE.     COMPARISON: CT PE dated 03/04/2025;   ACCESSION NUMBER(S): [id number]   ORDERING CLINICIAN: DEYANIRA ROSENTHAL   TECHNIQUE: Helical data acquisition of the chest was obtained after intravenous administration of 75 ML Omnipaque 350, as per PE protocol. Images were reformatted in coronal and sagittal planes. Axial and  coronal maximum intensity projection (MIP) images were created and reviewed.   FINDINGS: POTENTIAL LIMITATIONS OF THE STUDY: Images are somewhat limited by suboptimal contrast bolus timing, with preferential enhancement of the systemic arterial system instead of the pulmonary arterial system.   HEART AND VESSELS: Limited evaluation due to suboptimal contrast bolus timing with preferential enhancement of the pulmonary venous instead of the pulmonary arterial system. Within limits of the study, there are subtle filling defects partially visualized in the lobar artery supplying the right middle and right lower lobe, likely representing resolving emboli seen on previous exam on 03/04/2025. No new large central embolism is identified. Main pulmonary artery and its branches are normal in caliber.   The thoracic aorta normal in course and caliber.Minimal vascular calcifications are present in the thoracic aorta.Although, the study is not tailored for evaluation of aorta, there is no definite evidence of acute aortic pathology. Moderate coronary artery calcifications are seen. Please note,the study is not optimized for evaluation of coronary arteries.   The cardiac chambers are not enlarged.There are no findings to suggest right heart strain. There is no pericardial effusion seen.   MEDIASTINUM AND OSORIO, LOWER NECK AND AXILLA: The visualized thyroid gland is within normal limits. Several mildly enlarged mediastinal lymph nodes are present, measuring up to 1.4 cm in size in the right paratracheal space, up to 1.7 cm in size in the subcarinal space, and up to 1.7 cm in size in the right hilum. These are similar to prior study on 03/04/2025. Esophagus appears within normal limits as seen.   LUNGS AND AIRWAYS: The trachea and central airways are patent. No endobronchial lesion is seen.   There is redemonstration of the a 1.5 cm somewhat irregular shaped nodule in the right upper lobe (series 9, image 96), similar in size to  prior exam, with new patchy nodular airspace opacity is present slightly more inferior in the right upper lobe (series 9, image 130) compared to prior exam on 03/04/2025.   Mild atelectatic changes are present in the lower lobes bilaterally without evidence of consolidation or pleural effusion. No pneumothorax is present.   UPPER ABDOMEN: The visualized subdiaphragmatic structures demonstrate no remarkable findings.   CHEST WALL AND OSSEOUS STRUCTURES: Chest wall is within normal limits. No acute osseous pathology.There are no suspicious osseous lesions.Chronic compression deformity along superior endplate of L1 is similar to prior exam. Chronic compression deformity along superior endplate of T4 is also similar to prior study.   No acute abnormalities identified in the thoracic spine.       1. Limited examination due to suboptimal contrast bolus timing, with preferential enhancement of the pulmonary venous system instead of the pulmonary arterial system. Within limits of the exam, there is suggestion of the faint linear filling defects present within the lobar arteries supplying the right middle and right lower lobe, likely representing resolving pulmonary emboli previously visualized on 03/04/2025. No new large central saddle embolism is present in the interim since prior exam. 2. Subtle new patchy airspace opacities are present in the inferior aspect of the right upper lobe in the interim since prior study on 03/04/2025, suggestive of developing infiltrate/pneumonia. 3. A 1.5 cm somewhat irregular shaped solid nodule in the right upper lobe is similar in appearance to prior exam. 4. Several nonspecific enlarged mediastinal lymph nodes are similar to prior study, including subcarinal and right paratracheal lymph node, which measures up to 1.7 cm in short axis dimension.   MACRO: None   Signed by: Ema Figueroa 4/23/2025 1:13 AM Dictation workstation:   [id number]    Bronchoscopy Tier 2; w EBUS  Result  Date: 4/7/2025  Bronchoscopy Report [facility] Location: [facility] procedure room 4 INDICATION: Mediastinal lymphadenopathy BRONCHOSCOPIST: Sandeep Ledezma MD First Assistant: Second Assistant: REFERRING:  Benjamin Dowell MD FINDINGS: After obtaining informed consent and performing a time out, the patient was anesthetized and an ET tube was placed by anesthesia.  The flexible bronchoscope was then introduced through the advanced airway, and an airway examination was performed. The ET tube is in good position.  The trachea is of normal caliber. The kay is sharp. The tracheobronchial tree of the bilateral lung(s) was examined to at least the first subsegmental level.  Bronchial anatomy is normal; there are no endobronchial lesions, and no secretions.   The flexible bronchoscope was removed from the airway, and exchanged for the curvilinear EBUS bronchoscope.  A systematic EBUS staging examination of the bilateral ankit and mediastinum was performed, as documented below. Lymph node sizing was performed via endobronchial ultrasound for suspected lung cancer.  Sampling by transbronchial needle aspiration was performed using a 21-gauge Olympus ViziShot needle and sent for routine cytology. The 11L (interlobar) node measured 4.7 mm in size. Sampling was not done due to size criteria (less than 5 mm). The 4L (lower paratracheal) node measured 5.4 mm in size. Sampling was done, 4 samples with the needle were obtained. The 2L (upper paratracheal) node was not visualized. Sampling was not done as the node was not visualized. The 7 (subcarinal) node measured 14.5 mm in size. Sampling was done, 4 samples with the needle were obtained. The 4R (lower paratracheal) node measured 12.8 mm in size. Sampling was done, 4 samples with the needle were obtained. The 2R (upper paratracheal) node was not visualized. Sampling was not done as the node was not visualized. The 11Rs (superios interlobar) node  measured 11.6 mm in size. Sampling was done, 4 samples with the needle were obtained. The 11Ri (inferior interlobar) node measured 3.2 mm in size. Sampling was not done due to size criteria (less than 5 mm). Rapid On-Site Evaluation (RAIZA): Preliminary cytology from the lymph node station(s) 4R, 7, 11Rs showed lymphoid tissue (final results are pending). Preliminary cytology from the lymph node station(s) 4L showed non-diagnostic material (final results are pending). COMPLICATIONS: None ESTIMATED BLOOD LOSS:  Minimal, < 5 mL The physical status of the patient was re-assessed after the procedure.  The patient was transported to the recovery area / PACU in stable condition.      A systematic EBUS staging evaluation of the bilateral ankit and mediastinum was performed with EBUS-TBNA, as detailed above. Rapid On-Site Evaluation (RAIZA): Preliminary cytology was suggestive of lymphoid tissue in node level 4R, 7, 11Rs (final results are pending). Rapid On-Site Evaluation (RAIZA): Preliminary cytology was suggestive of non-diagnostic material in node level 4L (final results are pending). RECOMMENDATION: Follow-up with referring physician.           Assessment & Plan  Acute hypoxic respiratory failure  -suspected secondary to a developing pneumonia. He is not in CHF exacerbation and prior pulmonary emboli areas are improving  -antibiotics, nebulizer tx, supportive care, wean O2 as able-normally on RA  - pulmonary consult placed    Pneumonia of right upper lobe due to infectious organism  -Rocephin/Azithro, nebs  -Check Ur strep/legionella  Heart failure with recovered ejection fraction (HFrecEF)  -echos reviewed. EF 35-40% in 2024 and recovered to 50-55% in 3/2025  -follows with Dr Boss. Not in exacerbation  -continue metop and entresto, hold SGLT2i while inpatient  Hx pulmonary embolism  -continue Eliquis  - follows with pulmonology  Pulmonary nodule  -following with pulmonology, Dr Nieves, outpatient  Hyperparathyroidism  (Multi)  -Being followed by his PCP outpatient, PTH fairly stable from previous year to January of this year      DVT prophylaxis: Eliquis   Discharge plan:  Continue with ATB, follow up with pulmonology recommendations and wean O2 as able.   Anticipated discharge in 24-48 hours.  No needs at discharge.       Anastasia Coles, APRN-CNP

## 2025-04-23 NOTE — ED TRIAGE NOTES
Since yesterday has had fever/chills, sob, cough, and congestion, states he has history of PE on eliquis,

## 2025-04-23 NOTE — PROGRESS NOTES
04/23/25 1010   Discharge Planning   Living Arrangements Friends  (ex-girlfriend and her son)   Support Systems Friends/neighbors   Assistance Needed Patient reports he is indepednent of all ADLs and IADLs.   Type of Residence Private residence   Who is requesting discharge planning? Provider   Home or Post Acute Services None   Expected Discharge Disposition Home   Does the patient need discharge transport arranged? No   Financial Resource Strain   How hard is it for you to pay for the very basics like food, housing, medical care, and heating? Not very   Housing Stability   In the last 12 months, was there a time when you were not able to pay the mortgage or rent on time? N   In the past 12 months, how many times have you moved where you were living? 0   At any time in the past 12 months, were you homeless or living in a shelter (including now)? N   Transportation Needs   In the past 12 months, has lack of transportation kept you from medical appointments or from getting medications? no   In the past 12 months, has lack of transportation kept you from meetings, work, or from getting things needed for daily living? No   Stroke Family Assessment   Stroke Family Assessment Needed No   Intensity of Service   Intensity of Service 0-30 min     MSW met with patient at bedside. He reports he is independent at home and denies any home going needs or concerns at this time. Did speak with patient regarding barriers with access to food, medications, or transportation, however, patient denies barriers.    Patient plans to return home upon discharge.

## 2025-04-23 NOTE — CARE PLAN
The patient's goals for the shift include rest    The clinical goals for the shift include monitor O2, safety, monitor VS      Problem: Fall/Injury  Goal: Not fall by end of shift  Outcome: Progressing  Goal: Be free from injury by end of the shift  Outcome: Progressing  Goal: Verbalize understanding of personal risk factors for fall in the hospital  Outcome: Progressing  Goal: Verbalize understanding of risk factor reduction measures to prevent injury from fall in the home  Outcome: Progressing  Goal: Use assistive devices by end of the shift  Outcome: Progressing  Goal: Pace activities to prevent fatigue by end of the shift  Outcome: Progressing     Problem: Heart Failure  Goal: Improved gas exchange this shift  Outcome: Progressing  Goal: Improved urinary output this shift  Outcome: Progressing  Goal: Reduction in peripheral edema within 24 hours  Outcome: Progressing  Goal: Report improvement of dyspnea/breathlessness this shift  Outcome: Progressing  Goal: Weight from fluid excess reduced over 2-3 days, then stabilize  Outcome: Progressing  Goal: Increase self care and/or family involvement in 24 hours  Outcome: Progressing     Problem: Pain - Adult  Goal: Verbalizes/displays adequate comfort level or baseline comfort level  Outcome: Progressing     Problem: Safety - Adult  Goal: Free from fall injury  Outcome: Progressing     Problem: Discharge Planning  Goal: Discharge to home or other facility with appropriate resources  Outcome: Progressing     Problem: Chronic Conditions and Co-morbidities  Goal: Patient's chronic conditions and co-morbidity symptoms are monitored and maintained or improved  Outcome: Progressing     Problem: Nutrition  Goal: Nutrient intake appropriate for maintaining nutritional needs  Outcome: Progressing

## 2025-04-23 NOTE — H&P
History of Present Illness  Brandin Barfield is a 64 y.o. male who presented with shortness of breath and is admitted for Acute hypoxic respiratory failure    Subjective    He reports acute onset productive cough and sore throat with progressive shortness of breath beginning Monday night. He had chills, subjective fever and woke up drenched in sweat Tuesday. The difficulty breathing combined with some starting chest discomfort prompted him to present to the [facility] ER. He denies any sick contacts, nausea/vomiting or headache. He denies having had a pneumonia vaccine before. He was transferred to [facility] due to bed availability    His history is notable for HF with recovered EF, SVT(s/p ablation), recent pulmonary emboli on Eliquis, as well as pulmonary nodule and enlarged lymph nodes. He follows with pulm and cards.    Review of Systems   HENT:  Positive for congestion.    Respiratory:  Positive for cough and shortness of breath.          History    Brandin Barfield  has a past medical history of ADHD (attention deficit hyperactivity disorder) (1966), Allergic, Anxiety (1990), Arrhythmia, Arthritis (2020), CHF (congestive heart failure) (10/22), Depression (1990), Eczema (During sánchez), Fracture of lumbar spine (Multi), H/O supraventricular tachycardia, Headache (1980), Hyperlipidemia, Hypertension, Lung nodule, Obesity, Varicella (1964), and Visual impairment (1975).     Surgical History[1]   Patient  reports that he quit smoking about 16 years ago. His smoking use included cigarettes. He started smoking about 55 years ago. He has a 118.1 pack-year smoking history. He has been exposed to tobacco smoke. He has never used smokeless tobacco. He reports that he does not currently use alcohol. He reports that he does not use drugs.     Patient's family history includes Arthritis in his brother; Coronary artery disease in his brother and father; Depression in his mother; Drug abuse in his brother; Heart disease in his  "father; Hypertension in his mother; Mental illness in his mother.          Objective    Blood pressure 106/72, pulse 99, temperature 36.3 °C (97.3 °F), temperature source Temporal, resp. rate 24, height 1.905 m (6' 3\"), weight 140 kg (307 lb 8.7 oz), SpO2 94%.  Vitals Reviewed: yes  Constitutional: awake and alert, no acute distress, on nasal cannula oxygen  Eyes: EOMI, normal conjunctiva  HENT: tachy mucus membranes, airway patent  Pulm: few scattered right sided wheezes  Cardiac: reg rate, regular rhythm, no murmur  GI: Soft, non tender, non-distended, normal bowel sounds  MSK: trace bilat lower extremity edema  Neuro: No focal deficit, oriented, CN II-XII grossly intact  Psych: Cooperative, appropriate affect, appropriate judgement    No intake or output data in the 24 hours ending 04/23/25 0617    Relevant Results  CT angio chest for pulmonary embolism  Result Date: 4/23/2025  1. Limited examination due to suboptimal contrast bolus timing, with preferential enhancement of the pulmonary venous system instead of the pulmonary arterial system. Within limits of the exam, there is suggestion of the faint linear filling defects present within the lobar arteries supplying the right middle and right lower lobe, likely representing resolving pulmonary emboli previously visualized on 03/04/2025. No new large central saddle embolism is present in the interim since prior exam. 2. Subtle new patchy airspace opacities are present in the inferior aspect of the right upper lobe in the interim since prior study on 03/04/2025, suggestive of developing infiltrate/pneumonia. 3. A 1.5 cm somewhat irregular shaped solid nodule in the right upper lobe is similar in appearance to prior exam. 4. Several nonspecific enlarged mediastinal lymph nodes are similar to prior study, including subcarinal and right paratracheal lymph node, which measures up to 1.7 cm in short axis dimension.   MACRO: None   Signed by: Ema Figueroa " 4/23/2025 1:13 AM Dictation workstation:   [id number]      Scheduled medications  Scheduled Medications[2]  Continuous medications  Continuous Medications[3]  PRN medications  PRN Medications[4]        Assessment    Assessment & Plan  Acute hypoxic respiratory failure  -suspected secondary to a developing pneumonia. He is not in CHF exacerbation and prior pulmonary emboli areas are improving  -antibiotics, nebulizer tx, supportive care, wean O2 as able   Pneumonia of right upper lobe due to infectious organism  -Rocephin/vaibhav Reid  -Check Ur strep/legionella  Heart failure with recovered ejection fraction (HFrecEF)  -echos reviewed. EF 35-40% in 2024 and recovered to 50-55% in 3/2025  -follows with Dr Boss. Not in exacerbation  -continue metop and entresto, hold SGLT2i while inpatient  Hx pulmonary embolism  -continue Eliquis  Pulmonary nodule  -following with pulmonology, Dr Nieves, outpatient  Hyperparathyroidism (Multi)  -Being followed by his PCP outpatient, PTH fairly stable from previous year to January of this year    DVT prophylaxis: Eliquis  Disposition: Admit to [facility] tele      Morelia Ding         [1]   Past Surgical History:  Procedure Laterality Date    CARDIAC CATHETERIZATION  03/23    CARDIAC ELECTROPHYSIOLOGY STUDY AND ABLATION      CIRCUMCISION, PRIMARY  1960    MR HEAD ANGIO WO IV CONTRAST  06/21/2017    MR HEAD ANGIO WO IV CONTRAST 6/21/2017 [facility]    MR NECK ANGIO WO IV CONTRAST  06/21/2017    MR NECK ANGIO WO IV CONTRAST 6/21/2017 [facility]    OTHER SURGICAL HISTORY  11/07/2022    No history of surgery    WISDOM TOOTH EXTRACTION  2020   [2] apixaban, 5 mg, oral, BID  aspirin, 81 mg, oral, Daily  azithromycin, 250 mg, intravenous, q24h  buPROPion XL, 300 mg, oral, Daily  cefTRIAXone, 1 g, intravenous, q24h  DULoxetine, 30 mg, oral, BID  ipratropium-albuteroL, 3 mL, nebulization, q6h  metoprolol succinate XL, 25 mg, oral, q PM  primidone, 250 mg, oral,  Nightly  sacubitriL-valsartan, 1 tablet, oral, BID     [3]    [4] PRN medications: ipratropium-albuteroL, phenoL

## 2025-04-23 NOTE — ASSESSMENT & PLAN NOTE
-suspected secondary to a developing pneumonia. He is not in CHF exacerbation and prior pulmonary emboli areas are improving  -antibiotics, nebulizer tx, supportive care, wean O2 as able-normally on RA  - pulmonary consult placed

## 2025-04-23 NOTE — CONSULTS
Inpatient consult to Pulmonology  Consult performed by: ANT Rodriguez-CNP  Consult ordered by: ANT Carrillo-CNP  Reason for consult: Pneumonia, lung nodule        Reason For Consult  Pneumonia, lung nodule    History Of Present Illness  Brandin Barfield is a 64 y.o. male with a past medical history that includes but is not limited to multiple pulmonary emboli; on Eliquis,  lung nodule, congestive heart failure, coronary artery disease; status post NSTEMI, hypertension and supraventricular tachycardia; status post ablation who presented to [facility] Emergency Department from [facility] ED 2/2 lack of beds for evaluation of nonproductive cough, shortness of breath, chest congestion, subjective fevers/chills and heart palpitations since the previous day. He was hypoxic upon arrival; 90% on room air and placed on 3 L nasal cannula oxygen to maintain oxygen sats greater than or equal to 92%.  A CT angio was obtained and showed faint linear filling defects in the lobar arteries supplying the right middle and right lower lobe likely representing resolving pulmonary emboli without new large central saddle embolism since prior exam, subtle new patchy airspace opacities in the inferior aspect of the right upper lobe in the interim since prior study on 3/4/2025, a 1.5 cm somewhat irregular shaped solid nodule in the right upper lobe similar in appearance to prior exam and several nonspecific enlarged mediastinal lymph nodes are similar to prior study, including subcarinal and 1.7 cm right paratracheal lymph node. He was discharged from [facility] 3/6/25 after a 2 day hospitalization for pulmonary embolism and infarction, , acute renal failure and lung nodule. He denies nausea, vomiting, diarrhea, recent sick contacts, chest or abdominal pain.He is a patient of Dr. Chelsea Nieves.     Past Medical History  Diagnosis Date    ADHD (attention deficit hyperactivity disorder) 1966    Allergic     Anxiety      Arrhythmia     Arthritis 2020    CHF (congestive heart failure) 10/22    Depression     Eczema During sánchez    Fracture of lumbar spine (Multi)     H/O supraventricular tachycardia     Headache     Hyperlipidemia     Hypertension     Lung nodule     Obesity     Varicella 1964    Visual impairment        Surgical History  [Surgical History]    [Surgical History]  Past Surgical History  Procedure Laterality Date    CARDIAC CATHETERIZATION      CARDIAC ELECTROPHYSIOLOGY STUDY AND ABLATION      CIRCUMCISION, PRIMARY  1960    MR HEAD ANGIO WO IV CONTRAST  2017    MR HEAD ANGIO WO IV CONTRAST 2017 [facility]    MR NECK ANGIO WO IV CONTRAST  2017    MR NECK ANGIO WO IV CONTRAST 2017 [facility]    OTHER SURGICAL HISTORY  2022    No history of surgery    WISDOM TOOTH EXTRACTION          Social History  [Social History]    [Social History]  Tobacco Use    Smoking status: Former     Current packs/day: 0.00     Average packs/day: 3.0 packs/day for 39.4 years (118.1 ttl pk-yrs)     Types: Cigarettes     Start date: 1969     Quit date:      Years since quittin.3     Passive exposure: Past    Smokeless tobacco: Never   Vaping Use    Vaping status: Never Used   Substance Use Topics    Alcohol use: Not Currently     Comment: Quit in     Drug use: Never       Family History  Problem Relation Name Age of Onset    Coronary artery disease Father Fernando Efren Klinect     Heart disease Father Fernando Lottvyn Yoliect     Coronary artery disease Brother Fidel Barfield     Drug abuse Brother Fidel Barfield     Arthritis Brother Fidel Barfield     Depression Mother Betsy Omalleye Lico     Hypertension Mother Betsy Leighann Urbanoct     Mental illness Mother Betsy Lawton Geraldinect        Allergies  Vibramycin [doxycycline calcium] and Doxycycline    Review of Systems   Constitutional:  Positive for chills, fatigue and fever.   HENT:  "Negative.     Eyes: Negative.    Respiratory:  Positive for cough and shortness of breath.    Cardiovascular: Negative.    Gastrointestinal: Negative.    Endocrine: Negative.    Genitourinary: Negative.    Musculoskeletal: Negative.    Allergic/Immunologic: Negative.    Neurological: Negative.    Hematological: Negative.    Psychiatric/Behavioral: Negative.          Physical Exam  Vitals and nursing note reviewed.   Constitutional:       Appearance: He is obese.   HENT:      Head: Normocephalic and atraumatic.      Nose: Nose normal.      Mouth/Throat:      Mouth: Mucous membranes are moist.   Eyes:      Extraocular Movements: Extraocular movements intact.      Conjunctiva/sclera: Conjunctivae normal.      Pupils: Pupils are equal, round, and reactive to light.   Cardiovascular:      Rate and Rhythm: Normal rate and regular rhythm.      Pulses: Normal pulses.      Heart sounds: Normal heart sounds.   Pulmonary:      Effort: Pulmonary effort is normal.      Breath sounds: Rales present.      Comments: Bibasilar rales.   Abdominal:      General: Bowel sounds are normal.      Palpations: Abdomen is soft.   Musculoskeletal:         General: Normal range of motion.   Skin:     General: Skin is warm and dry.      Capillary Refill: Capillary refill takes less than 2 seconds.   Neurological:      General: No focal deficit present.      Mental Status: He is alert and oriented to person, place, and time.   Psychiatric:         Mood and Affect: Mood normal.         Behavior: Behavior normal.          Vital Signs  Blood pressure 93/62, pulse 99, temperature 36.7 °C (98.1 °F), temperature source Temporal, resp. rate 22, height 1.905 m (6' 3\"), weight 140 kg (307 lb 8.7 oz), SpO2 95%.  Oxygen Therapy  SpO2: 95 %  Medical Gas Therapy: Supplemental oxygen (NC3L)  Medical Gas Delivery Method: Nasal cannula     No intake or output data in the 24 hours ending 04/23/25 0912     Scheduled medications:  apixaban, 5 mg, oral, " BID  aspirin, 81 mg, oral, Daily  azithromycin, 250 mg, intravenous, q24h  buPROPion XL, 300 mg, oral, Daily  cefTRIAXone, 1 g, intravenous, q24h  DULoxetine, 30 mg, oral, BID  ipratropium-albuteroL, 3 mL, nebulization, q6h  metoprolol succinate XL, 25 mg, oral, q PM  primidone, 250 mg, oral, Nightly  sacubitriL-valsartan, 1 tablet, oral, BID    PRN medications: ipratropium-albuteroL, phenoL    Relevant Results  Results for orders placed or performed during the hospital encounter of 04/22/25 (from the past 24 hours)   Sars-CoV-2, Influenza A/B and RSV PCR   Result Value Ref Range    Coronavirus 2019, PCR Not Detected Not Detected    Flu A Result Not Detected Not Detected    Flu B Result Not Detected Not Detected    RSV PCR Not Detected Not Detected   CBC and Auto Differential   Result Value Ref Range    WBC 10.6 4.4 - 11.3 x10*3/uL    nRBC 0.0 0.0 - 0.0 /100 WBCs    RBC 5.44 4.50 - 5.90 x10*6/uL    Hemoglobin 15.8 13.5 - 17.5 g/dL    Hematocrit 47.1 41.0 - 52.0 %    MCV 87 80 - 100 fL    MCH 29.0 26.0 - 34.0 pg    MCHC 33.5 32.0 - 36.0 g/dL    RDW 13.7 11.5 - 14.5 %    Platelets 226 150 - 450 x10*3/uL    Neutrophils % 77.0 40.0 - 80.0 %    Immature Granulocytes %, Automated 0.3 0.0 - 0.9 %    Lymphocytes % 11.1 13.0 - 44.0 %    Monocytes % 7.9 2.0 - 10.0 %    Eosinophils % 3.1 0.0 - 6.0 %    Basophils % 0.6 0.0 - 2.0 %    Neutrophils Absolute 8.17 (H) 1.20 - 7.70 x10*3/uL    Immature Granulocytes Absolute, Automated 0.03 0.00 - 0.70 x10*3/uL    Lymphocytes Absolute 1.18 (L) 1.20 - 4.80 x10*3/uL    Monocytes Absolute 0.84 0.10 - 1.00 x10*3/uL    Eosinophils Absolute 0.33 0.00 - 0.70 x10*3/uL    Basophils Absolute 0.06 0.00 - 0.10 x10*3/uL   Comprehensive metabolic panel   Result Value Ref Range    Glucose 90 74 - 99 mg/dL    Sodium 137 136 - 145 mmol/L    Potassium 4.3 3.5 - 5.3 mmol/L    Chloride 105 98 - 107 mmol/L    Bicarbonate 26 21 - 32 mmol/L    Anion Gap 10 10 - 20 mmol/L    Urea Nitrogen 23 6 - 23 mg/dL     Creatinine 1.38 (H) 0.50 - 1.30 mg/dL    eGFR 57 (L) >60 mL/min/1.73m*2    Calcium 10.8 (H) 8.6 - 10.3 mg/dL    Albumin 4.1 3.4 - 5.0 g/dL    Alkaline Phosphatase 71 33 - 136 U/L    Total Protein 7.5 6.4 - 8.2 g/dL    AST 14 9 - 39 U/L    Bilirubin, Total 0.4 0.0 - 1.2 mg/dL    ALT 17 10 - 52 U/L   B-Type Natriuretic Peptide   Result Value Ref Range    BNP 25 0 - 99 pg/mL   Lactate   Result Value Ref Range    Lactate 1.5 0.4 - 2.0 mmol/L   Troponin I, High Sensitivity, Initial   Result Value Ref Range    Troponin I, High Sensitivity 7 0 - 20 ng/L   Troponin, High Sensitivity, 1 Hour   Result Value Ref Range    Troponin I, High Sensitivity 6 0 - 20 ng/L   Blood Culture    Specimen: Peripheral Venipuncture; Blood culture   Result Value Ref Range    Blood Culture Loaded on Instrument - Culture in progress       CT angio chest for pulmonary embolism  Result Date: 4/23/2025  FINDINGS: POTENTIAL LIMITATIONS OF THE STUDY: Images are somewhat limited by suboptimal contrast bolus timing, with preferential enhancement of the systemic arterial system instead of the pulmonary arterial system.   HEART AND VESSELS: Limited evaluation due to suboptimal contrast bolus timing with preferential enhancement of the pulmonary venous instead of the pulmonary arterial system. Within limits of the study, there are subtle filling defects partially visualized in the lobar artery supplying the right middle and right lower lobe, likely representing resolving emboli seen on previous exam on 03/04/2025. No new large central embolism is identified. Main pulmonary artery and its branches are normal in caliber.   The thoracic aorta normal in course and caliber.Minimal vascular calcifications are present in the thoracic aorta.Although, the study is not tailored for evaluation of aorta, there is no definite evidence of acute aortic pathology. Moderate coronary artery calcifications are seen. Please note,the study is not optimized for evaluation of  coronary arteries.   The cardiac chambers are not enlarged.There are no findings to suggest right heart strain. There is no pericardial effusion seen.   MEDIASTINUM AND OSORIO, LOWER NECK AND AXILLA: The visualized thyroid gland is within normal limits. Several mildly enlarged mediastinal lymph nodes are present, measuring up to 1.4 cm in size in the right paratracheal space, up to 1.7 cm in size in the subcarinal space, and up to 1.7 cm in size in the right hilum. These are similar to prior study on 03/04/2025. Esophagus appears within normal limits as seen.   LUNGS AND AIRWAYS: The trachea and central airways are patent. No endobronchial lesion is seen.   There is redemonstration of the a 1.5 cm somewhat irregular shaped nodule in the right upper lobe (series 9, image 96), similar in size to prior exam, with new patchy nodular airspace opacity is present slightly more inferior in the right upper lobe (series 9, image 130) compared to prior exam on 03/04/2025.   Mild atelectatic changes are present in the lower lobes bilaterally without evidence of consolidation or pleural effusion. No pneumothorax is present.   UPPER ABDOMEN: The visualized subdiaphragmatic structures demonstrate no remarkable findings.   CHEST WALL AND OSSEOUS STRUCTURES: Chest wall is within normal limits. No acute osseous pathology.There are no suspicious osseous lesions.Chronic compression deformity along superior endplate of L1 is similar to prior exam. Chronic compression deformity along superior endplate of T4 is also similar to prior study.   No acute abnormalities identified in the thoracic spine.  1. Limited examination due to suboptimal contrast bolus timing, with preferential enhancement of the pulmonary venous system instead of the pulmonary arterial system. Within limits of the exam, there is suggestion of the faint linear filling defects present within the lobar arteries supplying the right middle and right lower lobe, likely  representing resolving pulmonary emboli previously visualized on 03/04/2025. No new large central saddle embolism is present in the interim since prior exam. 2. Subtle new patchy airspace opacities are present in the inferior aspect of the right upper lobe in the interim since prior study on 03/04/2025, suggestive of developing infiltrate/pneumonia. 3. A 1.5 cm somewhat irregular shaped solid nodule in the right upper lobe is similar in appearance to prior exam. 4. Several nonspecific enlarged mediastinal lymph nodes are similar to prior study, including subcarinal and right paratracheal lymph node, which measures up to 1.7 cm in short axis dimension.       Bronchoscopy Tier 2; w EBUS  Result Date: 4/7/2025  FINDINGS: After obtaining informed consent and performing a time out, the patient was anesthetized and an ET tube was placed by anesthesia.  The flexible bronchoscope was then introduced through the advanced airway, and an airway examination was performed. The ET tube is in good position.  The trachea is of normal caliber. The kay is sharp. The tracheobronchial tree of the bilateral lung(s) was examined to at least the first subsegmental level.  Bronchial anatomy is normal; there are no endobronchial lesions, and no secretions.   The flexible bronchoscope was removed from the airway, and exchanged for the curvilinear EBUS bronchoscope.  A systematic EBUS staging examination of the bilateral ankit and mediastinum was performed, as documented below. Lymph node sizing was performed via endobronchial ultrasound for suspected lung cancer.  Sampling by transbronchial needle aspiration was performed using a 21-gauge Olympus ViziShot needle and sent for routine cytology. The 11L (interlobar) node measured 4.7 mm in size. Sampling was not done due to size criteria (less than 5 mm). The 4L (lower paratracheal) node measured 5.4 mm in size. Sampling was done, 4 samples with the needle were obtained. The 2L (upper  paratracheal) node was not visualized. Sampling was not done as the node was not visualized. The 7 (subcarinal) node measured 14.5 mm in size. Sampling was done, 4 samples with the needle were obtained. The 4R (lower paratracheal) node measured 12.8 mm in size. Sampling was done, 4 samples with the needle were obtained. The 2R (upper paratracheal) node was not visualized. Sampling was not done as the node was not visualized. The 11Rs (superios interlobar) node measured 11.6 mm in size. Sampling was done, 4 samples with the needle were obtained. The 11Ri (inferior interlobar) node measured 3.2 mm in size. Sampling was not done due to size criteria (less than 5 mm). Rapid On-Site Evaluation (RAIZA): Preliminary cytology from the lymph node station(s) 4R, 7, 11Rs showed lymphoid tissue (final results are pending). Preliminary cytology from the lymph node station(s) 4L showed non-diagnostic material (final results are pending). COMPLICATIONS: None ESTIMATED BLOOD LOSS:  Minimal, < 5 mL The physical status of the patient was re-assessed after the procedure.  The patient was transported to the recovery area / PACU in stable condition.   A systematic EBUS staging evaluation of the bilateral ankit and mediastinum was performed with EBUS-TBNA, as detailed above. Rapid On-Site Evaluation (RAIZA): Preliminary cytology was suggestive of lymphoid tissue in node level 4R, 7, 11Rs (final results are pending). Rapid On-Site Evaluation (RAIZA): Preliminary cytology was suggestive of non-diagnostic material in node level 4L (final results are pending). RECOMMENDATION: Follow-up with referring physician.        Assessment/Plan   Acute hypoxic respiratory failure  Wean oxygen as sats allow  Continuous pulse oximetry  Incentive spirometry/pulmonary hygiene    Pneumonia of right upper lobe  Likely healthcare acquired  Continue IV azithromycin, IV ceftriaxone  Sputum if able  Urine Legionella, histoplasma, T-spot,  strep pneumo, mycoplasma,  MRSA, titers pending  Sputum if able   Follow-up cultures    Chronic systolic congestive heart failure with pEF  2 D echo 3/5/25 with estimated EF: 50-55% without evidence of right heart strain    Right upper lobe lung nodule  PET-CT scan 3/20/25 showed right upper lobe nodule nodule with right hilar mediastinal lymph nodes followed by EBUS on 4/7/25 and no malignant cells were identified  CT scan chest in 6 weeks  Follow-up with Dr. Nieves as outpatient    History of multifocal bilateral pulmonary emboli   PERT team consulted and advised against thrombectomy/thrombolysis and advised heparin drip with transition to oral anticoagulation  CT angio this admission with faint linear filling defects in the lobar arteries supplying the right middle and right lower lobe likely representing resolving pulmonary emboli without new large central saddle embolism since prior exam  Systemic anticoagulation for at least 3 months-on Eliquis  Pulmonary function testing as outpatient  Nodify CDT/XL2 testing, pending results will need Thoracic Surgery consultation per Dr. Nieves    Obesity  Polysomnogram as outpatient    Prophylaxis  Eliquis    Reviewed with collaborating physician Dr. Amie Jade, APRN-CNP  Pulmonary & Critical Care Medicine

## 2025-04-24 LAB
ABO GROUP (TYPE) IN BLOOD: NORMAL
ANION GAP SERPL CALCULATED.3IONS-SCNC: 11 MMOL/L (ref 10–20)
ANTIBODY SCREEN: NORMAL
ATRIAL RATE: 126 BPM
BUN SERPL-MCNC: 18 MG/DL (ref 6–23)
CALCIUM SERPL-MCNC: 10 MG/DL (ref 8.6–10.3)
CHLORIDE SERPL-SCNC: 103 MMOL/L (ref 98–107)
CO2 SERPL-SCNC: 24 MMOL/L (ref 21–32)
CREAT SERPL-MCNC: 1.33 MG/DL (ref 0.5–1.3)
EGFRCR SERPLBLD CKD-EPI 2021: 60 ML/MIN/1.73M*2
ERYTHROCYTE [DISTWIDTH] IN BLOOD BY AUTOMATED COUNT: 14 % (ref 11.5–14.5)
GLUCOSE SERPL-MCNC: 110 MG/DL (ref 74–99)
HCT VFR BLD AUTO: 42.3 % (ref 41–52)
HGB BLD-MCNC: 13.4 G/DL (ref 13.5–17.5)
LEGIONELLA AG UR QL: NEGATIVE
MCH RBC QN AUTO: 27.9 PG (ref 26–34)
MCHC RBC AUTO-ENTMCNC: 31.7 G/DL (ref 32–36)
MCV RBC AUTO: 88 FL (ref 80–100)
NRBC BLD-RTO: 0 /100 WBCS (ref 0–0)
P AXIS: 34 DEGREES
P OFFSET: 196 MS
P ONSET: 148 MS
PLATELET # BLD AUTO: 199 X10*3/UL (ref 150–450)
POTASSIUM SERPL-SCNC: 4.4 MMOL/L (ref 3.5–5.3)
PR INTERVAL: 150 MS
Q ONSET: 223 MS
QRS COUNT: 21 BEATS
QRS DURATION: 94 MS
QT INTERVAL: 288 MS
QTC CALCULATION(BAZETT): 417 MS
QTC FREDERICIA: 369 MS
R AXIS: -15 DEGREES
RBC # BLD AUTO: 4.81 X10*6/UL (ref 4.5–5.9)
RH FACTOR (ANTIGEN D): NORMAL
S PNEUM AG UR QL: NEGATIVE
SODIUM SERPL-SCNC: 134 MMOL/L (ref 136–145)
T AXIS: 27 DEGREES
T OFFSET: 367 MS
VENTRICULAR RATE: 126 BPM
WBC # BLD AUTO: 8.2 X10*3/UL (ref 4.4–11.3)

## 2025-04-24 PROCEDURE — 1200000002 HC GENERAL ROOM WITH TELEMETRY DAILY

## 2025-04-24 PROCEDURE — 36415 COLL VENOUS BLD VENIPUNCTURE: CPT | Performed by: NURSE PRACTITIONER

## 2025-04-24 PROCEDURE — 99232 SBSQ HOSP IP/OBS MODERATE 35: CPT | Performed by: NURSE PRACTITIONER

## 2025-04-24 PROCEDURE — 9420000001 HC RT PATIENT EDUCATION 5 MIN

## 2025-04-24 PROCEDURE — 2500000001 HC RX 250 WO HCPCS SELF ADMINISTERED DRUGS (ALT 637 FOR MEDICARE OP): Performed by: REGISTERED NURSE

## 2025-04-24 PROCEDURE — 36415 COLL VENOUS BLD VENIPUNCTURE: CPT | Performed by: STUDENT IN AN ORGANIZED HEALTH CARE EDUCATION/TRAINING PROGRAM

## 2025-04-24 PROCEDURE — 94640 AIRWAY INHALATION TREATMENT: CPT

## 2025-04-24 PROCEDURE — 80048 BASIC METABOLIC PNL TOTAL CA: CPT | Performed by: STUDENT IN AN ORGANIZED HEALTH CARE EDUCATION/TRAINING PROGRAM

## 2025-04-24 PROCEDURE — 2500000002 HC RX 250 W HCPCS SELF ADMINISTERED DRUGS (ALT 637 FOR MEDICARE OP, ALT 636 FOR OP/ED)

## 2025-04-24 PROCEDURE — 86738 MYCOPLASMA ANTIBODY: CPT | Performed by: NURSE PRACTITIONER

## 2025-04-24 PROCEDURE — 86901 BLOOD TYPING SEROLOGIC RH(D): CPT | Performed by: NURSE PRACTITIONER

## 2025-04-24 PROCEDURE — 2500000005 HC RX 250 GENERAL PHARMACY W/O HCPCS: Performed by: STUDENT IN AN ORGANIZED HEALTH CARE EDUCATION/TRAINING PROGRAM

## 2025-04-24 PROCEDURE — 85027 COMPLETE CBC AUTOMATED: CPT | Performed by: STUDENT IN AN ORGANIZED HEALTH CARE EDUCATION/TRAINING PROGRAM

## 2025-04-24 PROCEDURE — 87385 HISTOPLASMA CAPSUL AG IA: CPT | Performed by: NURSE PRACTITIONER

## 2025-04-24 PROCEDURE — 2500000001 HC RX 250 WO HCPCS SELF ADMINISTERED DRUGS (ALT 637 FOR MEDICARE OP): Performed by: STUDENT IN AN ORGANIZED HEALTH CARE EDUCATION/TRAINING PROGRAM

## 2025-04-24 PROCEDURE — 2500000004 HC RX 250 GENERAL PHARMACY W/ HCPCS (ALT 636 FOR OP/ED): Mod: JZ | Performed by: STUDENT IN AN ORGANIZED HEALTH CARE EDUCATION/TRAINING PROGRAM

## 2025-04-24 PROCEDURE — 87081 CULTURE SCREEN ONLY: CPT | Mod: TRILAB | Performed by: NURSE PRACTITIONER

## 2025-04-24 PROCEDURE — 2500000002 HC RX 250 W HCPCS SELF ADMINISTERED DRUGS (ALT 637 FOR MEDICARE OP, ALT 636 FOR OP/ED): Performed by: STUDENT IN AN ORGANIZED HEALTH CARE EDUCATION/TRAINING PROGRAM

## 2025-04-24 RX ORDER — AZITHROMYCIN 250 MG/1
250 TABLET, FILM COATED ORAL
Status: DISCONTINUED | OUTPATIENT
Start: 2025-04-24 | End: 2025-04-26 | Stop reason: HOSPADM

## 2025-04-24 RX ADMIN — BUPROPION HYDROCHLORIDE 300 MG: 300 TABLET, EXTENDED RELEASE ORAL at 09:01

## 2025-04-24 RX ADMIN — IPRATROPIUM BROMIDE AND ALBUTEROL SULFATE 3 ML: 2.5; .5 SOLUTION RESPIRATORY (INHALATION) at 20:18

## 2025-04-24 RX ADMIN — SACUBITRIL AND VALSARTAN 1 TABLET: 24; 26 TABLET, FILM COATED ORAL at 09:09

## 2025-04-24 RX ADMIN — DULOXETINE HYDROCHLORIDE 30 MG: 30 CAPSULE, DELAYED RELEASE ORAL at 09:02

## 2025-04-24 RX ADMIN — IPRATROPIUM BROMIDE AND ALBUTEROL SULFATE 3 ML: 2.5; .5 SOLUTION RESPIRATORY (INHALATION) at 13:04

## 2025-04-24 RX ADMIN — DULOXETINE HYDROCHLORIDE 30 MG: 30 CAPSULE, DELAYED RELEASE ORAL at 19:54

## 2025-04-24 RX ADMIN — CEFTRIAXONE SODIUM 1 G: 1 INJECTION, SOLUTION INTRAVENOUS at 21:25

## 2025-04-24 RX ADMIN — APIXABAN 5 MG: 5 TABLET, FILM COATED ORAL at 18:22

## 2025-04-24 RX ADMIN — AZITHROMYCIN DIHYDRATE 250 MG: 250 TABLET, FILM COATED ORAL at 19:54

## 2025-04-24 RX ADMIN — IPRATROPIUM BROMIDE AND ALBUTEROL SULFATE 3 ML: 2.5; .5 SOLUTION RESPIRATORY (INHALATION) at 07:09

## 2025-04-24 RX ADMIN — ASPIRIN 81 MG: 81 TABLET, COATED ORAL at 09:03

## 2025-04-24 RX ADMIN — ACETAMINOPHEN 650 MG: 325 TABLET, FILM COATED ORAL at 15:33

## 2025-04-24 RX ADMIN — APIXABAN 5 MG: 5 TABLET, FILM COATED ORAL at 05:19

## 2025-04-24 RX ADMIN — ACETAMINOPHEN 650 MG: 325 TABLET, FILM COATED ORAL at 05:21

## 2025-04-24 RX ADMIN — PRIMIDONE 250 MG: 50 TABLET ORAL at 19:54

## 2025-04-24 RX ADMIN — Medication 28 PERCENT: at 20:19

## 2025-04-24 RX ADMIN — Medication 28 PERCENT: at 07:11

## 2025-04-24 RX ADMIN — METOPROLOL SUCCINATE 25 MG: 25 TABLET, EXTENDED RELEASE ORAL at 19:54

## 2025-04-24 ASSESSMENT — PAIN - FUNCTIONAL ASSESSMENT
PAIN_FUNCTIONAL_ASSESSMENT: 0-10
PAIN_FUNCTIONAL_ASSESSMENT: UNABLE TO SELF-REPORT

## 2025-04-24 ASSESSMENT — PAIN SCALES - GENERAL
PAINLEVEL_OUTOF10: 4
PAINLEVEL_OUTOF10: 0 - NO PAIN
PAINLEVEL_OUTOF10: 3
PAINLEVEL_OUTOF10: 0 - NO PAIN
PAINLEVEL_OUTOF10: 0 - NO PAIN

## 2025-04-24 ASSESSMENT — COGNITIVE AND FUNCTIONAL STATUS - GENERAL
MOBILITY SCORE: 24
DAILY ACTIVITIY SCORE: 24

## 2025-04-24 ASSESSMENT — PAIN DESCRIPTION - LOCATION: LOCATION: NECK

## 2025-04-24 NOTE — CARE PLAN
Problem: Fall/Injury  Goal: Not fall by end of shift  Outcome: Progressing  Goal: Be free from injury by end of the shift  Outcome: Progressing  Goal: Verbalize understanding of personal risk factors for fall in the hospital  Outcome: Progressing     Problem: Respiratory  Goal: Clear secretions with interventions this shift  Outcome: Progressing  Goal: Minimize anxiety/maximize coping throughout shift  Outcome: Progressing  Goal: Minimal/no exertional discomfort or dyspnea this shift  Outcome: Progressing   The patient's goals for the shift include rest    The clinical goals for the shift include Stable Oxygen Levels

## 2025-04-24 NOTE — PROGRESS NOTES
Brandin Barfield is a 64 y.o. male on day 1 of admission presenting with Acute hypoxic respiratory failure.      Subjective   Patient seen and examined. Patient feeling better. Failed O2 wean, currently remains on 2 L NC. Denies fever or chills, currently denies SOB.        Objective     Last Recorded Vitals  /74 (BP Location: Right arm, Patient Position: Lying)   Pulse 93   Temp 36.6 °C (97.9 °F) (Temporal)   Resp 22   Wt 140 kg (307 lb 8.7 oz)   SpO2 95%   Intake/Output last 3 Shifts:    Intake/Output Summary (Last 24 hours) at 4/24/2025 1052  Last data filed at 4/24/2025 0929  Gross per 24 hour   Intake 600 ml   Output 900 ml   Net -300 ml       Admission Weight  Weight: 140 kg (307 lb 8.7 oz) (04/23/25 0527)    Daily Weight  04/23/25 : 140 kg (307 lb 8.7 oz)    Image Results  ECG 12 Lead  Sinus tachycardia  Left ventricular hypertrophy with repolarization abnormality ( R in aVL )  Cannot rule out Septal infarct , age undetermined  Abnormal ECG  When compared with ECG of 04-MAR-2025 11:29,  T wave inversion no longer evident in Inferior leads  T wave inversion no longer evident in Lateral leads  Confirmed by Benjamin Ureña ([id number]) on 4/24/2025 8:04:30 AM      Physical Exam  Vitals reviewed.   Constitutional:       Appearance: He is obese. He is ill-appearing.   HENT:      Head: Normocephalic.      Nose: Nose normal.      Mouth/Throat:      Mouth: Mucous membranes are moist.   Eyes:      Extraocular Movements: Extraocular movements intact.   Cardiovascular:      Rate and Rhythm: Regular rhythm.   Pulmonary:      Effort: Pulmonary effort is normal.   Abdominal:      General: Bowel sounds are normal.   Musculoskeletal:         General: Normal range of motion.      Cervical back: Neck supple.   Skin:     General: Skin is warm.   Neurological:      Mental Status: He is alert and oriented to person, place, and time.         Relevant Results      Results for orders placed or performed during the hospital  encounter of 04/23/25 (from the past 24 hours)   CBC   Result Value Ref Range    WBC 8.2 4.4 - 11.3 x10*3/uL    nRBC 0.0 0.0 - 0.0 /100 WBCs    RBC 4.81 4.50 - 5.90 x10*6/uL    Hemoglobin 13.4 (L) 13.5 - 17.5 g/dL    Hematocrit 42.3 41.0 - 52.0 %    MCV 88 80 - 100 fL    MCH 27.9 26.0 - 34.0 pg    MCHC 31.7 (L) 32.0 - 36.0 g/dL    RDW 14.0 11.5 - 14.5 %    Platelets 199 150 - 450 x10*3/uL   Basic Metabolic Panel   Result Value Ref Range    Glucose 110 (H) 74 - 99 mg/dL    Sodium 134 (L) 136 - 145 mmol/L    Potassium 4.4 3.5 - 5.3 mmol/L    Chloride 103 98 - 107 mmol/L    Bicarbonate 24 21 - 32 mmol/L    Anion Gap 11 10 - 20 mmol/L    Urea Nitrogen 18 6 - 23 mg/dL    Creatinine 1.33 (H) 0.50 - 1.30 mg/dL    eGFR 60 (L) >60 mL/min/1.73m*2    Calcium 10.0 8.6 - 10.3 mg/dL   Type and screen   Result Value Ref Range    ABO TYPE B     Rh TYPE POS     ANTIBODY SCREEN NEG     ECG 12 Lead  Result Date: 4/23/2025  Sinus tachycardia Left ventricular hypertrophy with repolarization abnormality ( R in aVL ) Cannot rule out Septal infarct , age undetermined Abnormal ECG When compared with ECG of 04-MAR-2025 11:29, ST now depressed in Lateral leads T wave inversion no longer evident in Inferior leads T wave inversion no longer evident in Lateral leads    CT angio chest for pulmonary embolism  Result Date: 4/23/2025  Interpreted By:  Ema Figueroa, STUDY: CT ANGIO CHEST FOR PULMONARY EMBOLISM;  4/22/2025 11:14 pm   INDICATION: Signs/Symptoms:Dyspnea, tachycardia, history of PE.     COMPARISON: CT PE dated 03/04/2025;   ACCESSION NUMBER(S): [id number]   ORDERING CLINICIAN: DEYANIRA ROSENTHAL   TECHNIQUE: Helical data acquisition of the chest was obtained after intravenous administration of 75 ML Omnipaque 350, as per PE protocol. Images were reformatted in coronal and sagittal planes. Axial and coronal maximum intensity projection (MIP) images were created and reviewed.   FINDINGS: POTENTIAL LIMITATIONS OF THE STUDY: Images are  somewhat limited by suboptimal contrast bolus timing, with preferential enhancement of the systemic arterial system instead of the pulmonary arterial system.   HEART AND VESSELS: Limited evaluation due to suboptimal contrast bolus timing with preferential enhancement of the pulmonary venous instead of the pulmonary arterial system. Within limits of the study, there are subtle filling defects partially visualized in the lobar artery supplying the right middle and right lower lobe, likely representing resolving emboli seen on previous exam on 03/04/2025. No new large central embolism is identified. Main pulmonary artery and its branches are normal in caliber.   The thoracic aorta normal in course and caliber.Minimal vascular calcifications are present in the thoracic aorta.Although, the study is not tailored for evaluation of aorta, there is no definite evidence of acute aortic pathology. Moderate coronary artery calcifications are seen. Please note,the study is not optimized for evaluation of coronary arteries.   The cardiac chambers are not enlarged.There are no findings to suggest right heart strain. There is no pericardial effusion seen.   MEDIASTINUM AND OSORIO, LOWER NECK AND AXILLA: The visualized thyroid gland is within normal limits. Several mildly enlarged mediastinal lymph nodes are present, measuring up to 1.4 cm in size in the right paratracheal space, up to 1.7 cm in size in the subcarinal space, and up to 1.7 cm in size in the right hilum. These are similar to prior study on 03/04/2025. Esophagus appears within normal limits as seen.   LUNGS AND AIRWAYS: The trachea and central airways are patent. No endobronchial lesion is seen.   There is redemonstration of the a 1.5 cm somewhat irregular shaped nodule in the right upper lobe (series 9, image 96), similar in size to prior exam, with new patchy nodular airspace opacity is present slightly more inferior in the right upper lobe (series 9, image 130)  compared to prior exam on 03/04/2025.   Mild atelectatic changes are present in the lower lobes bilaterally without evidence of consolidation or pleural effusion. No pneumothorax is present.   UPPER ABDOMEN: The visualized subdiaphragmatic structures demonstrate no remarkable findings.   CHEST WALL AND OSSEOUS STRUCTURES: Chest wall is within normal limits. No acute osseous pathology.There are no suspicious osseous lesions.Chronic compression deformity along superior endplate of L1 is similar to prior exam. Chronic compression deformity along superior endplate of T4 is also similar to prior study.   No acute abnormalities identified in the thoracic spine.       1. Limited examination due to suboptimal contrast bolus timing, with preferential enhancement of the pulmonary venous system instead of the pulmonary arterial system. Within limits of the exam, there is suggestion of the faint linear filling defects present within the lobar arteries supplying the right middle and right lower lobe, likely representing resolving pulmonary emboli previously visualized on 03/04/2025. No new large central saddle embolism is present in the interim since prior exam. 2. Subtle new patchy airspace opacities are present in the inferior aspect of the right upper lobe in the interim since prior study on 03/04/2025, suggestive of developing infiltrate/pneumonia. 3. A 1.5 cm somewhat irregular shaped solid nodule in the right upper lobe is similar in appearance to prior exam. 4. Several nonspecific enlarged mediastinal lymph nodes are similar to prior study, including subcarinal and right paratracheal lymph node, which measures up to 1.7 cm in short axis dimension.   MACRO: None   Signed by: Ema Figueroa 4/23/2025 1:13 AM Dictation workstation:   [id number]    Bronchoscopy Tier 2; w EBUS  Result Date: 4/7/2025  Bronchoscopy Report [facility] Location: [facility] procedure room 4 INDICATION: Mediastinal  lymphadenopathy BRONCHOSCOPIST: Sandeep Ledezma MD First Assistant: Second Assistant: REFERRING:  Benjamin Dowell MD FINDINGS: After obtaining informed consent and performing a time out, the patient was anesthetized and an ET tube was placed by anesthesia.  The flexible bronchoscope was then introduced through the advanced airway, and an airway examination was performed. The ET tube is in good position.  The trachea is of normal caliber. The kay is sharp. The tracheobronchial tree of the bilateral lung(s) was examined to at least the first subsegmental level.  Bronchial anatomy is normal; there are no endobronchial lesions, and no secretions.   The flexible bronchoscope was removed from the airway, and exchanged for the curvilinear EBUS bronchoscope.  A systematic EBUS staging examination of the bilateral ankit and mediastinum was performed, as documented below. Lymph node sizing was performed via endobronchial ultrasound for suspected lung cancer.  Sampling by transbronchial needle aspiration was performed using a 21-gauge Olympus ViziShot needle and sent for routine cytology. The 11L (interlobar) node measured 4.7 mm in size. Sampling was not done due to size criteria (less than 5 mm). The 4L (lower paratracheal) node measured 5.4 mm in size. Sampling was done, 4 samples with the needle were obtained. The 2L (upper paratracheal) node was not visualized. Sampling was not done as the node was not visualized. The 7 (subcarinal) node measured 14.5 mm in size. Sampling was done, 4 samples with the needle were obtained. The 4R (lower paratracheal) node measured 12.8 mm in size. Sampling was done, 4 samples with the needle were obtained. The 2R (upper paratracheal) node was not visualized. Sampling was not done as the node was not visualized. The 11Rs (superios interlobar) node measured 11.6 mm in size. Sampling was done, 4 samples with the needle were obtained. The 11Ri (inferior interlobar) node measured 3.2 mm  in size. Sampling was not done due to size criteria (less than 5 mm). Rapid On-Site Evaluation (RAIZA): Preliminary cytology from the lymph node station(s) 4R, 7, 11Rs showed lymphoid tissue (final results are pending). Preliminary cytology from the lymph node station(s) 4L showed non-diagnostic material (final results are pending). COMPLICATIONS: None ESTIMATED BLOOD LOSS:  Minimal, < 5 mL The physical status of the patient was re-assessed after the procedure.  The patient was transported to the recovery area / PACU in stable condition.      A systematic EBUS staging evaluation of the bilateral ankit and mediastinum was performed with EBUS-TBNA, as detailed above. Rapid On-Site Evaluation (RAIZA): Preliminary cytology was suggestive of lymphoid tissue in node level 4R, 7, 11Rs (final results are pending). Rapid On-Site Evaluation (RAIZA): Preliminary cytology was suggestive of non-diagnostic material in node level 4L (final results are pending). RECOMMENDATION: Follow-up with referring physician.           Assessment & Plan  Acute hypoxic respiratory failure  -suspected secondary to a developing pneumonia. He is not in CHF exacerbation and prior pulmonary emboli areas are improving  -antibiotics, nebulizer tx, supportive care, wean O2 as able-normally on RA  - pulmonary consult placed    Pneumonia of right upper lobe due to infectious organism  -Rocephin/vaibhav Reid  -Check Ur strep/legionella  Heart failure with recovered ejection fraction (HFrecEF)  -echos reviewed. EF 35-40% in 2024 and recovered to 50-55% in 3/2025  -follows with Dr Boss. Not in exacerbation  -continue metop and entresto, hold SGLT2i while inpatient  Hx pulmonary embolism  -continue Eliquis  - follows with pulmonology  Pulmonary nodule  -following with pulmonology, Dr Nieves, outpatient  Hyperparathyroidism (Multi)  -Being followed by his PCP outpatient, PTH fairly stable from previous year to January of this year      DVT prophylaxis:  Eliquis   Discharge plan:  Continue with ATB, follow up with pulmonology recommendations and wean O2 as able.   Anticipated discharge in 24-48 hours.  Possible home O2 need at discharge.       Anastasia Coles, APRN-CNP

## 2025-04-24 NOTE — PROGRESS NOTES
04/24/25 1118   Discharge Planning   Expected Discharge Disposition Home   Does the patient need discharge transport arranged? No

## 2025-04-24 NOTE — PROGRESS NOTES
"Brandin Barfield is a 64 y.o. male on day 1 of admission seen in follow-up for acute hypoxic respiratory failure, pneumonia, lung nodule    Subjective   Sitting up in chair. On room air; oxygen sats 86%. Placed on 2 L nasal cannula oxygen to maintain oxygen sats greater than or equal to 92%. Endorses a productive cough for tan sputum. Denies pain. Afebrile.     Objective     Physical Exam  Vitals and nursing note reviewed.   Constitutional:       Appearance: He is obese.   HENT:      Head: Normocephalic and atraumatic.      Nose: Nose normal.      Mouth/Throat:      Mouth: Mucous membranes are moist.   Eyes:      Extraocular Movements: Extraocular movements intact.      Conjunctiva/sclera: Conjunctivae normal.      Pupils: Pupils are equal, round, and reactive to light.   Cardiovascular:      Rate and Rhythm: Normal rate and regular rhythm.      Pulses: Normal pulses.      Heart sounds: Normal heart sounds.   Pulmonary:      Effort: Pulmonary effort is normal.      Breath sounds: Normal breath sounds.   Abdominal:      General: Bowel sounds are normal.      Palpations: Abdomen is soft.   Musculoskeletal:         General: Normal range of motion.   Skin:     General: Skin is warm and dry.      Capillary Refill: Capillary refill takes less than 2 seconds.   Neurological:      General: No focal deficit present.      Mental Status: He is alert and oriented to person, place, and time.   Psychiatric:         Mood and Affect: Mood normal.         Behavior: Behavior normal.         Last Recorded Vitals  Blood pressure 117/74, pulse 93, temperature 36.6 °C (97.9 °F), temperature source Temporal, resp. rate 22, height 1.905 m (6' 3\"), weight 140 kg (307 lb 8.7 oz), SpO2 94%.      Intake/Output Summary (Last 24 hours) at 4/24/2025 6006  Last data filed at 4/24/2025 0340  Gross per 24 hour   Intake 900 ml   Output 900 ml   Net 0 ml      Scheduled medications:  apixaban, 5 mg, oral, BID  aspirin, 81 mg, oral, Daily  azithromycin, " 250 mg, intravenous, q24h  buPROPion XL, 300 mg, oral, Daily  cefTRIAXone, 1 g, intravenous, q24h  DULoxetine, 30 mg, oral, BID  ipratropium-albuteroL, 3 mL, nebulization, q6h while awake  metoprolol succinate XL, 25 mg, oral, q PM  oxygen, , inhalation, Continuous - Inhalation  primidone, 250 mg, oral, Nightly  sacubitriL-valsartan, 1 tablet, oral, BID    PRN medications: acetaminophen, ipratropium-albuteroL, phenoL    Relevant Results  Results for orders placed or performed during the hospital encounter of 04/23/25 (from the past 24 hours)   CBC   Result Value Ref Range    WBC 8.2 4.4 - 11.3 x10*3/uL    nRBC 0.0 0.0 - 0.0 /100 WBCs    RBC 4.81 4.50 - 5.90 x10*6/uL    Hemoglobin 13.4 (L) 13.5 - 17.5 g/dL    Hematocrit 42.3 41.0 - 52.0 %    MCV 88 80 - 100 fL    MCH 27.9 26.0 - 34.0 pg    MCHC 31.7 (L) 32.0 - 36.0 g/dL    RDW 14.0 11.5 - 14.5 %    Platelets 199 150 - 450 x10*3/uL   Basic Metabolic Panel   Result Value Ref Range    Glucose 110 (H) 74 - 99 mg/dL    Sodium 134 (L) 136 - 145 mmol/L    Potassium 4.4 3.5 - 5.3 mmol/L    Chloride 103 98 - 107 mmol/L    Bicarbonate 24 21 - 32 mmol/L    Anion Gap 11 10 - 20 mmol/L    Urea Nitrogen 18 6 - 23 mg/dL    Creatinine 1.33 (H) 0.50 - 1.30 mg/dL    eGFR 60 (L) >60 mL/min/1.73m*2    Calcium 10.0 8.6 - 10.3 mg/dL        CT angio chest for pulmonary embolism  Result Date: 4/23/2025  FINDINGS: POTENTIAL LIMITATIONS OF THE STUDY: Images are somewhat limited by suboptimal contrast bolus timing, with preferential enhancement of the systemic arterial system instead of the pulmonary arterial system.   HEART AND VESSELS: Limited evaluation due to suboptimal contrast bolus timing with preferential enhancement of the pulmonary venous instead of the pulmonary arterial system. Within limits of the study, there are subtle filling defects partially visualized in the lobar artery supplying the right middle and right lower lobe, likely representing resolving emboli seen on previous  exam on 03/04/2025. No new large central embolism is identified. Main pulmonary artery and its branches are normal in caliber.   The thoracic aorta normal in course and caliber.Minimal vascular calcifications are present in the thoracic aorta.Although, the study is not tailored for evaluation of aorta, there is no definite evidence of acute aortic pathology. Moderate coronary artery calcifications are seen. Please note,the study is not optimized for evaluation of coronary arteries.   The cardiac chambers are not enlarged.There are no findings to suggest right heart strain. There is no pericardial effusion seen.   MEDIASTINUM AND OSORIO, LOWER NECK AND AXILLA: The visualized thyroid gland is within normal limits. Several mildly enlarged mediastinal lymph nodes are present, measuring up to 1.4 cm in size in the right paratracheal space, up to 1.7 cm in size in the subcarinal space, and up to 1.7 cm in size in the right hilum. These are similar to prior study on 03/04/2025. Esophagus appears within normal limits as seen.   LUNGS AND AIRWAYS: The trachea and central airways are patent. No endobronchial lesion is seen.   There is redemonstration of the a 1.5 cm somewhat irregular shaped nodule in the right upper lobe (series 9, image 96), similar in size to prior exam, with new patchy nodular airspace opacity is present slightly more inferior in the right upper lobe (series 9, image 130) compared to prior exam on 03/04/2025.   Mild atelectatic changes are present in the lower lobes bilaterally without evidence of consolidation or pleural effusion. No pneumothorax is present.   UPPER ABDOMEN: The visualized subdiaphragmatic structures demonstrate no remarkable findings.   CHEST WALL AND OSSEOUS STRUCTURES: Chest wall is within normal limits. No acute osseous pathology.There are no suspicious osseous lesions.Chronic compression deformity along superior endplate of L1 is similar to prior exam. Chronic compression deformity  along superior endplate of T4 is also similar to prior study.   No acute abnormalities identified in the thoracic spine. 1. Limited examination due to suboptimal contrast bolus timing, with preferential enhancement of the pulmonary venous system instead of the pulmonary arterial system. Within limits of the exam, there is suggestion of the faint linear filling defects present within the lobar arteries supplying the right middle and right lower lobe, likely representing resolving pulmonary emboli previously visualized on 03/04/2025. No new large central saddle embolism is present in the interim since prior exam. 2. Subtle new patchy airspace opacities are present in the inferior aspect of the right upper lobe in the interim since prior study on 03/04/2025, suggestive of developing infiltrate/pneumonia. 3. A 1.5 cm somewhat irregular shaped solid nodule in the right upper lobe is similar in appearance to prior exam. 4. Several nonspecific enlarged mediastinal lymph nodes are similar to prior study, including subcarinal and right paratracheal lymph node, which measures up to 1.7 cm in short axis dimension.         Assessment & Plan    Acute hypoxic respiratory failure  Wean oxygen as sats allow  Continuous pulse oximetry  Incentive spirometry/pulmonary hygiene     Pneumonia of right upper lobe  Likely healthcare acquired  Continue IV azithromycin, IV ceftriaxone  Sputum culture pending  Urine Legionella, strep pneumo normal  Histoplasma, T-spot, mycoplasma, MRSA, pending  Follow-up cultures     Chronic systolic congestive heart failure with pEF  2 D echo 3/5/25 with estimated EF: 50-55% without evidence of right heart strain     Right upper lobe lung nodule  PET-CT scan 3/20/25 showed right upper lobe nodule nodule with right hilar mediastinal lymph nodes followed by EBUS on 4/7/25 and no malignant cells were identified  CT scan chest in 6 weeks  Follow-up with Dr. Nieves as outpatient     History of multifocal  bilateral pulmonary emboli   PERT team consulted and advised against thrombectomy/thrombolysis and advised heparin drip with transition to oral anticoagulation  CT angio this admission with faint linear filling defects in the lobar arteries supplying the right middle and right lower lobe likely representing resolving pulmonary emboli without new large central saddle embolism since prior exam  Systemic anticoagulation for at least 3 months-on Eliquis  Pulmonary function testing as outpatient  Nodify CDT/XL2 testing, pending results will need Thoracic Surgery consultation per Dr. Nieves     Obesity  Polysomnogram as outpatient     Prophylaxis  ANT Sanders-CNP  Pulmonary & Critical Care Medicine

## 2025-04-24 NOTE — NURSING NOTE
NP, Ender Coles asked me to see how patient did without oxygen. I took patient off of oxygen at 95% saturation on 2l NC. Patient's oxygen dropped down to 86% within a half hour. The pulmonologist Luisana Jade wants 2L NC of oxygen to stay on the patient. Patient now has 2L NC on, saturating at 94%.

## 2025-04-25 LAB
ANION GAP SERPL CALCULATED.3IONS-SCNC: 10 MMOL/L (ref 10–20)
BACTERIA SPEC RESP CULT: NORMAL
BUN SERPL-MCNC: 18 MG/DL (ref 6–23)
CALCIUM SERPL-MCNC: 10.4 MG/DL (ref 8.6–10.3)
CHLORIDE SERPL-SCNC: 104 MMOL/L (ref 98–107)
CO2 SERPL-SCNC: 24 MMOL/L (ref 21–32)
CREAT SERPL-MCNC: 1.25 MG/DL (ref 0.5–1.3)
EGFRCR SERPLBLD CKD-EPI 2021: 64 ML/MIN/1.73M*2
ERYTHROCYTE [DISTWIDTH] IN BLOOD BY AUTOMATED COUNT: 14 % (ref 11.5–14.5)
GLUCOSE SERPL-MCNC: 121 MG/DL (ref 74–99)
GRAM STN SPEC: NORMAL
GRAM STN SPEC: NORMAL
HCT VFR BLD AUTO: 42.2 % (ref 41–52)
HGB BLD-MCNC: 13.2 G/DL (ref 13.5–17.5)
MCH RBC QN AUTO: 27.6 PG (ref 26–34)
MCHC RBC AUTO-ENTMCNC: 31.3 G/DL (ref 32–36)
MCV RBC AUTO: 88 FL (ref 80–100)
NIL(NEG) CONTROL SPOT COUNT: NORMAL
NRBC BLD-RTO: 0 /100 WBCS (ref 0–0)
PANEL A SPOT COUNT: 0
PANEL B SPOT COUNT: 0
PLATELET # BLD AUTO: 195 X10*3/UL (ref 150–450)
POS CONTROL SPOT COUNT: NORMAL
POTASSIUM SERPL-SCNC: 4.4 MMOL/L (ref 3.5–5.3)
RBC # BLD AUTO: 4.79 X10*6/UL (ref 4.5–5.9)
SODIUM SERPL-SCNC: 134 MMOL/L (ref 136–145)
T-SPOT. TB INTERPRETATION: NEGATIVE
WBC # BLD AUTO: 7.4 X10*3/UL (ref 4.4–11.3)

## 2025-04-25 PROCEDURE — 2500000004 HC RX 250 GENERAL PHARMACY W/ HCPCS (ALT 636 FOR OP/ED): Mod: JZ | Performed by: STUDENT IN AN ORGANIZED HEALTH CARE EDUCATION/TRAINING PROGRAM

## 2025-04-25 PROCEDURE — 2500000001 HC RX 250 WO HCPCS SELF ADMINISTERED DRUGS (ALT 637 FOR MEDICARE OP): Performed by: REGISTERED NURSE

## 2025-04-25 PROCEDURE — 2500000005 HC RX 250 GENERAL PHARMACY W/O HCPCS: Performed by: STUDENT IN AN ORGANIZED HEALTH CARE EDUCATION/TRAINING PROGRAM

## 2025-04-25 PROCEDURE — 2500000002 HC RX 250 W HCPCS SELF ADMINISTERED DRUGS (ALT 637 FOR MEDICARE OP, ALT 636 FOR OP/ED): Performed by: STUDENT IN AN ORGANIZED HEALTH CARE EDUCATION/TRAINING PROGRAM

## 2025-04-25 PROCEDURE — 2500000002 HC RX 250 W HCPCS SELF ADMINISTERED DRUGS (ALT 637 FOR MEDICARE OP, ALT 636 FOR OP/ED)

## 2025-04-25 PROCEDURE — 99232 SBSQ HOSP IP/OBS MODERATE 35: CPT | Performed by: NURSE PRACTITIONER

## 2025-04-25 PROCEDURE — 1200000002 HC GENERAL ROOM WITH TELEMETRY DAILY

## 2025-04-25 PROCEDURE — 36415 COLL VENOUS BLD VENIPUNCTURE: CPT | Performed by: STUDENT IN AN ORGANIZED HEALTH CARE EDUCATION/TRAINING PROGRAM

## 2025-04-25 PROCEDURE — 94640 AIRWAY INHALATION TREATMENT: CPT

## 2025-04-25 PROCEDURE — 2500000001 HC RX 250 WO HCPCS SELF ADMINISTERED DRUGS (ALT 637 FOR MEDICARE OP): Performed by: STUDENT IN AN ORGANIZED HEALTH CARE EDUCATION/TRAINING PROGRAM

## 2025-04-25 PROCEDURE — 80048 BASIC METABOLIC PNL TOTAL CA: CPT | Performed by: STUDENT IN AN ORGANIZED HEALTH CARE EDUCATION/TRAINING PROGRAM

## 2025-04-25 PROCEDURE — 85027 COMPLETE CBC AUTOMATED: CPT | Performed by: STUDENT IN AN ORGANIZED HEALTH CARE EDUCATION/TRAINING PROGRAM

## 2025-04-25 RX ADMIN — SACUBITRIL AND VALSARTAN 1 TABLET: 24; 26 TABLET, FILM COATED ORAL at 21:17

## 2025-04-25 RX ADMIN — PRIMIDONE 250 MG: 50 TABLET ORAL at 21:25

## 2025-04-25 RX ADMIN — CEFTRIAXONE SODIUM 1 G: 1 INJECTION, SOLUTION INTRAVENOUS at 21:17

## 2025-04-25 RX ADMIN — APIXABAN 5 MG: 5 TABLET, FILM COATED ORAL at 05:48

## 2025-04-25 RX ADMIN — APIXABAN 5 MG: 5 TABLET, FILM COATED ORAL at 17:13

## 2025-04-25 RX ADMIN — ASPIRIN 81 MG: 81 TABLET, COATED ORAL at 08:45

## 2025-04-25 RX ADMIN — BUPROPION HYDROCHLORIDE 300 MG: 300 TABLET, EXTENDED RELEASE ORAL at 08:45

## 2025-04-25 RX ADMIN — DULOXETINE HYDROCHLORIDE 30 MG: 30 CAPSULE, DELAYED RELEASE ORAL at 08:45

## 2025-04-25 RX ADMIN — METOPROLOL SUCCINATE 25 MG: 25 TABLET, EXTENDED RELEASE ORAL at 21:28

## 2025-04-25 RX ADMIN — IPRATROPIUM BROMIDE AND ALBUTEROL SULFATE 3 ML: 2.5; .5 SOLUTION RESPIRATORY (INHALATION) at 18:37

## 2025-04-25 RX ADMIN — Medication 2 L/MIN: at 07:46

## 2025-04-25 RX ADMIN — IPRATROPIUM BROMIDE AND ALBUTEROL SULFATE 3 ML: 2.5; .5 SOLUTION RESPIRATORY (INHALATION) at 13:15

## 2025-04-25 RX ADMIN — DULOXETINE HYDROCHLORIDE 30 MG: 30 CAPSULE, DELAYED RELEASE ORAL at 21:26

## 2025-04-25 RX ADMIN — SACUBITRIL AND VALSARTAN 1 TABLET: 24; 26 TABLET, FILM COATED ORAL at 08:50

## 2025-04-25 RX ADMIN — ACETAMINOPHEN 650 MG: 325 TABLET, FILM COATED ORAL at 08:49

## 2025-04-25 RX ADMIN — Medication 2 L/MIN: at 19:00

## 2025-04-25 RX ADMIN — IPRATROPIUM BROMIDE AND ALBUTEROL SULFATE 3 ML: 2.5; .5 SOLUTION RESPIRATORY (INHALATION) at 07:46

## 2025-04-25 RX ADMIN — AZITHROMYCIN DIHYDRATE 250 MG: 250 TABLET, FILM COATED ORAL at 21:26

## 2025-04-25 ASSESSMENT — PAIN SCALES - GENERAL
PAINLEVEL_OUTOF10: 0 - NO PAIN
PAINLEVEL_OUTOF10: 5 - MODERATE PAIN
PAINLEVEL_OUTOF10: 0 - NO PAIN
PAINLEVEL_OUTOF10: 3

## 2025-04-25 ASSESSMENT — COGNITIVE AND FUNCTIONAL STATUS - GENERAL
MOBILITY SCORE: 24
DAILY ACTIVITIY SCORE: 24

## 2025-04-25 NOTE — PROGRESS NOTES
04/25/25 0745   Discharge Planning   Expected Discharge Disposition Home   Does the patient need discharge transport arranged? No     Patient plans to return home upon discharge.

## 2025-04-25 NOTE — PROGRESS NOTES
"Brandin Barfield is a 64 y.o. male on day 2 of admission seen in follow-up for acute hypoxic respiratory failure, pneumonia, lung nodule    Subjective   On 2 L nasal cannula oxygen; oxygen sats 89-90%. Increased to 3 L on my exam. R.N. aware.Endorses a productive cough for tan sputum. Denies pain. Afebrile.     Objective     Physical Exam  Vitals and nursing note reviewed.   Constitutional:       Appearance: He is obese.   HENT:      Head: Normocephalic and atraumatic.      Nose: Nose normal.      Mouth/Throat:      Mouth: Mucous membranes are moist.   Eyes:      Extraocular Movements: Extraocular movements intact.      Conjunctiva/sclera: Conjunctivae normal.      Pupils: Pupils are equal, round, and reactive to light.   Cardiovascular:      Rate and Rhythm: Normal rate and regular rhythm.      Pulses: Normal pulses.      Heart sounds: Normal heart sounds.   Pulmonary:      Effort: Pulmonary effort is normal.      Breath sounds: Normal breath sounds.   Abdominal:      General: Bowel sounds are normal.      Palpations: Abdomen is soft.   Musculoskeletal:         General: Normal range of motion.   Skin:     General: Skin is warm and dry.      Capillary Refill: Capillary refill takes less than 2 seconds.   Neurological:      General: No focal deficit present.      Mental Status: He is alert and oriented to person, place, and time.   Psychiatric:         Mood and Affect: Mood normal.         Behavior: Behavior normal.         Last Recorded Vitals  Blood pressure 112/80, pulse 95, temperature 36.7 °C (98.1 °F), temperature source Temporal, resp. rate 22, height 1.905 m (6' 3\"), weight 140 kg (307 lb 8.7 oz), SpO2 94%.      Intake/Output Summary (Last 24 hours) at 4/25/2025 0890  Last data filed at 4/25/2025 0500  Gross per 24 hour   Intake 100 ml   Output 700 ml   Net -600 ml      Scheduled medications:  apixaban, 5 mg, oral, BID  aspirin, 81 mg, oral, Daily  azithromycin, 250 mg, intravenous, q24h  buPROPion XL, 300 mg, " oral, Daily  cefTRIAXone, 1 g, intravenous, q24h  DULoxetine, 30 mg, oral, BID  ipratropium-albuteroL, 3 mL, nebulization, q6h while awake  metoprolol succinate XL, 25 mg, oral, q PM  oxygen, , inhalation, Continuous - Inhalation  primidone, 250 mg, oral, Nightly  sacubitriL-valsartan, 1 tablet, oral, BID    PRN medications: acetaminophen, ipratropium-albuteroL, phenoL    Relevant Results  Results for orders placed or performed during the hospital encounter of 04/23/25 (from the past 24 hours)   Type and screen   Result Value Ref Range    ABO TYPE B     Rh TYPE POS     ANTIBODY SCREEN NEG    CBC   Result Value Ref Range    WBC 7.4 4.4 - 11.3 x10*3/uL    nRBC 0.0 0.0 - 0.0 /100 WBCs    RBC 4.79 4.50 - 5.90 x10*6/uL    Hemoglobin 13.2 (L) 13.5 - 17.5 g/dL    Hematocrit 42.2 41.0 - 52.0 %    MCV 88 80 - 100 fL    MCH 27.6 26.0 - 34.0 pg    MCHC 31.3 (L) 32.0 - 36.0 g/dL    RDW 14.0 11.5 - 14.5 %    Platelets 195 150 - 450 x10*3/uL   Basic Metabolic Panel   Result Value Ref Range    Glucose 121 (H) 74 - 99 mg/dL    Sodium 134 (L) 136 - 145 mmol/L    Potassium 4.4 3.5 - 5.3 mmol/L    Chloride 104 98 - 107 mmol/L    Bicarbonate 24 21 - 32 mmol/L    Anion Gap 10 10 - 20 mmol/L    Urea Nitrogen 18 6 - 23 mg/dL    Creatinine 1.25 0.50 - 1.30 mg/dL    eGFR 64 >60 mL/min/1.73m*2    Calcium 10.4 (H) 8.6 - 10.3 mg/dL        CT angio chest for pulmonary embolism  Result Date: 4/23/2025  FINDINGS: POTENTIAL LIMITATIONS OF THE STUDY: Images are somewhat limited by suboptimal contrast bolus timing, with preferential enhancement of the systemic arterial system instead of the pulmonary arterial system.   HEART AND VESSELS: Limited evaluation due to suboptimal contrast bolus timing with preferential enhancement of the pulmonary venous instead of the pulmonary arterial system. Within limits of the study, there are subtle filling defects partially visualized in the lobar artery supplying the right middle and right lower lobe, likely  representing resolving emboli seen on previous exam on 03/04/2025. No new large central embolism is identified. Main pulmonary artery and its branches are normal in caliber.   The thoracic aorta normal in course and caliber.Minimal vascular calcifications are present in the thoracic aorta.Although, the study is not tailored for evaluation of aorta, there is no definite evidence of acute aortic pathology. Moderate coronary artery calcifications are seen. Please note,the study is not optimized for evaluation of coronary arteries.   The cardiac chambers are not enlarged.There are no findings to suggest right heart strain. There is no pericardial effusion seen.   MEDIASTINUM AND OSORIO, LOWER NECK AND AXILLA: The visualized thyroid gland is within normal limits. Several mildly enlarged mediastinal lymph nodes are present, measuring up to 1.4 cm in size in the right paratracheal space, up to 1.7 cm in size in the subcarinal space, and up to 1.7 cm in size in the right hilum. These are similar to prior study on 03/04/2025. Esophagus appears within normal limits as seen.   LUNGS AND AIRWAYS: The trachea and central airways are patent. No endobronchial lesion is seen.   There is redemonstration of the a 1.5 cm somewhat irregular shaped nodule in the right upper lobe (series 9, image 96), similar in size to prior exam, with new patchy nodular airspace opacity is present slightly more inferior in the right upper lobe (series 9, image 130) compared to prior exam on 03/04/2025.   Mild atelectatic changes are present in the lower lobes bilaterally without evidence of consolidation or pleural effusion. No pneumothorax is present.   UPPER ABDOMEN: The visualized subdiaphragmatic structures demonstrate no remarkable findings.   CHEST WALL AND OSSEOUS STRUCTURES: Chest wall is within normal limits. No acute osseous pathology.There are no suspicious osseous lesions.Chronic compression deformity along superior endplate of L1 is similar  to prior exam. Chronic compression deformity along superior endplate of T4 is also similar to prior study.   No acute abnormalities identified in the thoracic spine. 1. Limited examination due to suboptimal contrast bolus timing, with preferential enhancement of the pulmonary venous system instead of the pulmonary arterial system. Within limits of the exam, there is suggestion of the faint linear filling defects present within the lobar arteries supplying the right middle and right lower lobe, likely representing resolving pulmonary emboli previously visualized on 03/04/2025. No new large central saddle embolism is present in the interim since prior exam. 2. Subtle new patchy airspace opacities are present in the inferior aspect of the right upper lobe in the interim since prior study on 03/04/2025, suggestive of developing infiltrate/pneumonia. 3. A 1.5 cm somewhat irregular shaped solid nodule in the right upper lobe is similar in appearance to prior exam. 4. Several nonspecific enlarged mediastinal lymph nodes are similar to prior study, including subcarinal and right paratracheal lymph node, which measures up to 1.7 cm in short axis dimension.         Assessment & Plan    Acute hypoxic respiratory failure  Wean oxygen as sats allow  Home oxygen certification prior to discharge  Continuous pulse oximetry  Incentive spirometry/pulmonary hygiene     Pneumonia of right upper lobe  Likely healthcare acquired  Continue IV azithromycin, IV ceftriaxone  Sputum culture pending  Urine Legionella, strep pneumo normal  Histoplasma, T-spot, mycoplasma, MRSA, pending  Follow-up cultures     Chronic systolic congestive heart failure with pEF  2 D echo 3/5/25 with estimated EF: 50-55% without evidence of right heart strain     Right upper lobe lung nodule  PET-CT scan 3/20/25 showed right upper lobe nodule nodule with right hilar mediastinal lymph nodes followed by EBUS on 4/7/25 and no malignant cells were identified  CT scan  chest in 6 weeks  Follow-up with Dr. Nieves as outpatient     History of multifocal bilateral pulmonary emboli   PERT team consulted and advised against thrombectomy/thrombolysis and advised heparin drip with transition to oral anticoagulation  CT angio this admission with faint linear filling defects in the lobar arteries supplying the right middle and right lower lobe likely representing resolving pulmonary emboli without new large central saddle embolism since prior exam  Systemic anticoagulation for at least 3 months-on Eliquis  Pulmonary function testing as outpatient  Nodify CDT/XL2 testing, pending results will need Thoracic Surgery consultation per Dr. Nieves     Obesity  Polysomnogram as outpatient     Prophylaxis  ANT Sanders-CNP  Pulmonary & Critical Care Medicine

## 2025-04-25 NOTE — CARE PLAN
Problem: Respiratory  Goal: Clear secretions with interventions this shift  Outcome: Progressing  Goal: Minimize anxiety/maximize coping throughout shift  Outcome: Progressing  Goal: Minimal/no exertional discomfort or dyspnea this shift  Outcome: Progressing  Goal: No signs of respiratory distress (eg. Use of accessory muscles. Peds grunting)  Outcome: Progressing  Goal: Wean oxygen to maintain O2 saturation per order/standard this shift  Outcome: Progressing  Goal: Patent airway maintained this shift  Outcome: Progressing  Goal: Tolerate pulmonary toileting this shift  Outcome: Progressing  Goal: Verbalize decreased shortness of breath this shift  Outcome: Progressing  Goal: Increase self care and/or family involvement in next 24 hours  Outcome: Progressing

## 2025-04-25 NOTE — PROGRESS NOTES
Brandin Barfield is a 64 y.o. male on day 2 of admission presenting with Acute hypoxic respiratory failure.      Subjective   Patient seen and examined. Patient slept well, no further improvement reported.  Failed O2 wean this am, currently remains on 2 L NC. Denies fever or chills, currently denies SOB.       Objective     Last Recorded Vitals  /80 (BP Location: Right arm, Patient Position: Lying)   Pulse 95   Temp 36.7 °C (98.1 °F) (Temporal)   Resp 22   Wt 140 kg (307 lb 8.7 oz)   SpO2 94%   Intake/Output last 3 Shifts:    Intake/Output Summary (Last 24 hours) at 4/25/2025 1041  Last data filed at 4/25/2025 0928  Gross per 24 hour   Intake 200 ml   Output 700 ml   Net -500 ml       Admission Weight  Weight: 140 kg (307 lb 8.7 oz) (04/23/25 0527)    Daily Weight  04/23/25 : 140 kg (307 lb 8.7 oz)    Image Results  ECG 12 Lead  Sinus tachycardia  Left ventricular hypertrophy with repolarization abnormality ( R in aVL )  Cannot rule out Septal infarct , age undetermined  Abnormal ECG  When compared with ECG of 04-MAR-2025 11:29,  T wave inversion no longer evident in Inferior leads  T wave inversion no longer evident in Lateral leads  Confirmed by Benjamin Ureña ([id number]) on 4/24/2025 8:04:30 AM      Physical Exam  Vitals reviewed.   Constitutional:       Appearance: He is obese. He is ill-appearing.   HENT:      Head: Normocephalic.      Nose: Nose normal.      Mouth/Throat:      Mouth: Mucous membranes are moist.   Eyes:      Extraocular Movements: Extraocular movements intact.   Cardiovascular:      Rate and Rhythm: Regular rhythm.   Pulmonary:      Effort: Pulmonary effort is normal.   Abdominal:      General: Bowel sounds are normal.   Musculoskeletal:         General: Normal range of motion.      Cervical back: Neck supple.   Skin:     General: Skin is warm.   Neurological:      Mental Status: He is alert and oriented to person, place, and time.         Relevant Results      Results for orders placed or  performed during the hospital encounter of 04/23/25 (from the past 24 hours)   CBC   Result Value Ref Range    WBC 7.4 4.4 - 11.3 x10*3/uL    nRBC 0.0 0.0 - 0.0 /100 WBCs    RBC 4.79 4.50 - 5.90 x10*6/uL    Hemoglobin 13.2 (L) 13.5 - 17.5 g/dL    Hematocrit 42.2 41.0 - 52.0 %    MCV 88 80 - 100 fL    MCH 27.6 26.0 - 34.0 pg    MCHC 31.3 (L) 32.0 - 36.0 g/dL    RDW 14.0 11.5 - 14.5 %    Platelets 195 150 - 450 x10*3/uL   Basic Metabolic Panel   Result Value Ref Range    Glucose 121 (H) 74 - 99 mg/dL    Sodium 134 (L) 136 - 145 mmol/L    Potassium 4.4 3.5 - 5.3 mmol/L    Chloride 104 98 - 107 mmol/L    Bicarbonate 24 21 - 32 mmol/L    Anion Gap 10 10 - 20 mmol/L    Urea Nitrogen 18 6 - 23 mg/dL    Creatinine 1.25 0.50 - 1.30 mg/dL    eGFR 64 >60 mL/min/1.73m*2    Calcium 10.4 (H) 8.6 - 10.3 mg/dL    ECG 12 Lead  Result Date: 4/23/2025  Sinus tachycardia Left ventricular hypertrophy with repolarization abnormality ( R in aVL ) Cannot rule out Septal infarct , age undetermined Abnormal ECG When compared with ECG of 04-MAR-2025 11:29, ST now depressed in Lateral leads T wave inversion no longer evident in Inferior leads T wave inversion no longer evident in Lateral leads    CT angio chest for pulmonary embolism  Result Date: 4/23/2025  Interpreted By:  Ema Figueroa, STUDY: CT ANGIO CHEST FOR PULMONARY EMBOLISM;  4/22/2025 11:14 pm   INDICATION: Signs/Symptoms:Dyspnea, tachycardia, history of PE.     COMPARISON: CT PE dated 03/04/2025;   ACCESSION NUMBER(S): [id number]   ORDERING CLINICIAN: DEYANIRA ROSENTHAL   TECHNIQUE: Helical data acquisition of the chest was obtained after intravenous administration of 75 ML Omnipaque 350, as per PE protocol. Images were reformatted in coronal and sagittal planes. Axial and coronal maximum intensity projection (MIP) images were created and reviewed.   FINDINGS: POTENTIAL LIMITATIONS OF THE STUDY: Images are somewhat limited by suboptimal contrast bolus timing, with preferential  enhancement of the systemic arterial system instead of the pulmonary arterial system.   HEART AND VESSELS: Limited evaluation due to suboptimal contrast bolus timing with preferential enhancement of the pulmonary venous instead of the pulmonary arterial system. Within limits of the study, there are subtle filling defects partially visualized in the lobar artery supplying the right middle and right lower lobe, likely representing resolving emboli seen on previous exam on 03/04/2025. No new large central embolism is identified. Main pulmonary artery and its branches are normal in caliber.   The thoracic aorta normal in course and caliber.Minimal vascular calcifications are present in the thoracic aorta.Although, the study is not tailored for evaluation of aorta, there is no definite evidence of acute aortic pathology. Moderate coronary artery calcifications are seen. Please note,the study is not optimized for evaluation of coronary arteries.   The cardiac chambers are not enlarged.There are no findings to suggest right heart strain. There is no pericardial effusion seen.   MEDIASTINUM AND OSORIO, LOWER NECK AND AXILLA: The visualized thyroid gland is within normal limits. Several mildly enlarged mediastinal lymph nodes are present, measuring up to 1.4 cm in size in the right paratracheal space, up to 1.7 cm in size in the subcarinal space, and up to 1.7 cm in size in the right hilum. These are similar to prior study on 03/04/2025. Esophagus appears within normal limits as seen.   LUNGS AND AIRWAYS: The trachea and central airways are patent. No endobronchial lesion is seen.   There is redemonstration of the a 1.5 cm somewhat irregular shaped nodule in the right upper lobe (series 9, image 96), similar in size to prior exam, with new patchy nodular airspace opacity is present slightly more inferior in the right upper lobe (series 9, image 130) compared to prior exam on 03/04/2025.   Mild atelectatic changes are present  in the lower lobes bilaterally without evidence of consolidation or pleural effusion. No pneumothorax is present.   UPPER ABDOMEN: The visualized subdiaphragmatic structures demonstrate no remarkable findings.   CHEST WALL AND OSSEOUS STRUCTURES: Chest wall is within normal limits. No acute osseous pathology.There are no suspicious osseous lesions.Chronic compression deformity along superior endplate of L1 is similar to prior exam. Chronic compression deformity along superior endplate of T4 is also similar to prior study.   No acute abnormalities identified in the thoracic spine.       1. Limited examination due to suboptimal contrast bolus timing, with preferential enhancement of the pulmonary venous system instead of the pulmonary arterial system. Within limits of the exam, there is suggestion of the faint linear filling defects present within the lobar arteries supplying the right middle and right lower lobe, likely representing resolving pulmonary emboli previously visualized on 03/04/2025. No new large central saddle embolism is present in the interim since prior exam. 2. Subtle new patchy airspace opacities are present in the inferior aspect of the right upper lobe in the interim since prior study on 03/04/2025, suggestive of developing infiltrate/pneumonia. 3. A 1.5 cm somewhat irregular shaped solid nodule in the right upper lobe is similar in appearance to prior exam. 4. Several nonspecific enlarged mediastinal lymph nodes are similar to prior study, including subcarinal and right paratracheal lymph node, which measures up to 1.7 cm in short axis dimension.   MACRO: None   Signed by: Ema Figueroa 4/23/2025 1:13 AM Dictation workstation:   [id number]    Bronchoscopy Tier 2; w EBUS  Result Date: 4/7/2025  Bronchoscopy Report [facility] Location: [facility] procedure room 4 INDICATION: Mediastinal lymphadenopathy BRONCHOSCOPIST: Sandeep Ledezma MD First Assistant: Nestor  Assistant: REFERRING:  Benjamin Dowell MD FINDINGS: After obtaining informed consent and performing a time out, the patient was anesthetized and an ET tube was placed by anesthesia.  The flexible bronchoscope was then introduced through the advanced airway, and an airway examination was performed. The ET tube is in good position.  The trachea is of normal caliber. The kay is sharp. The tracheobronchial tree of the bilateral lung(s) was examined to at least the first subsegmental level.  Bronchial anatomy is normal; there are no endobronchial lesions, and no secretions.   The flexible bronchoscope was removed from the airway, and exchanged for the curvilinear EBUS bronchoscope.  A systematic EBUS staging examination of the bilateral ankit and mediastinum was performed, as documented below. Lymph node sizing was performed via endobronchial ultrasound for suspected lung cancer.  Sampling by transbronchial needle aspiration was performed using a 21-gauge Olympus ViziShot needle and sent for routine cytology. The 11L (interlobar) node measured 4.7 mm in size. Sampling was not done due to size criteria (less than 5 mm). The 4L (lower paratracheal) node measured 5.4 mm in size. Sampling was done, 4 samples with the needle were obtained. The 2L (upper paratracheal) node was not visualized. Sampling was not done as the node was not visualized. The 7 (subcarinal) node measured 14.5 mm in size. Sampling was done, 4 samples with the needle were obtained. The 4R (lower paratracheal) node measured 12.8 mm in size. Sampling was done, 4 samples with the needle were obtained. The 2R (upper paratracheal) node was not visualized. Sampling was not done as the node was not visualized. The 11Rs (superios interlobar) node measured 11.6 mm in size. Sampling was done, 4 samples with the needle were obtained. The 11Ri (inferior interlobar) node measured 3.2 mm in size. Sampling was not done due to size criteria (less than 5 mm). Rapid  On-Site Evaluation (RAIZA): Preliminary cytology from the lymph node station(s) 4R, 7, 11Rs showed lymphoid tissue (final results are pending). Preliminary cytology from the lymph node station(s) 4L showed non-diagnostic material (final results are pending). COMPLICATIONS: None ESTIMATED BLOOD LOSS:  Minimal, < 5 mL The physical status of the patient was re-assessed after the procedure.  The patient was transported to the recovery area / PACU in stable condition.      A systematic EBUS staging evaluation of the bilateral ankit and mediastinum was performed with EBUS-TBNA, as detailed above. Rapid On-Site Evaluation (RAIZA): Preliminary cytology was suggestive of lymphoid tissue in node level 4R, 7, 11Rs (final results are pending). Rapid On-Site Evaluation (RAIZA): Preliminary cytology was suggestive of non-diagnostic material in node level 4L (final results are pending). RECOMMENDATION: Follow-up with referring physician.           Assessment & Plan  Acute hypoxic respiratory failure  -suspected secondary to a developing pneumonia. He is not in CHF exacerbation and prior pulmonary emboli areas are improving  -antibiotics, nebulizer tx, supportive care, wean O2 as able-normally on RA  - pulmonary consult placed    Pneumonia of right upper lobe due to infectious organism  -Rocephin/Azithro, nebs  -Check Ur strep/legionella-> negative  Heart failure with recovered ejection fraction (HFrecEF)  -echos reviewed. EF 35-40% in 2024 and recovered to 50-55% in 3/2025  -follows with Dr Boss. Not in exacerbation  -continue metop and entresto, hold SGLT2i while inpatient  Hx pulmonary embolism  -continue Eliquis  - follows with pulmonology  Pulmonary nodule  -following with pulmonology, Dr Nieves, outpatient  Hyperparathyroidism (Multi)  -Being followed by his PCP outpatient, PTH fairly stable from previous year to January of this year      DVT prophylaxis: Eliquis   Discharge plan:  Continue with ATB, follow up with  pulmonology recommendations and wean O2 as able.   Anticipated discharge in 24-48 hours.  Possible home O2 need at discharge.       Anastasia Coles, APRN-CNP

## 2025-04-25 NOTE — CARE PLAN
The patient's goals for the shift include comfort and know discharge plan.     The clinical goals for the shift include maintain safety, comfort, encourage OOB to chair/ambulation, attempt to wean of supplemental O2 assist with safe discharge plan, and monitor labs/VS.    Problem: Fall/Injury  Goal: Not fall by end of shift  Outcome: Progressing  Goal: Be free from injury by end of the shift  Outcome: Progressing  Goal: Verbalize understanding of personal risk factors for fall in the hospital  Outcome: Progressing  Goal: Verbalize understanding of risk factor reduction measures to prevent injury from fall in the home  Outcome: Progressing  Goal: Use assistive devices by end of the shift  Outcome: Progressing  Goal: Pace activities to prevent fatigue by end of the shift  Outcome: Progressing     Problem: Heart Failure  Goal: Improved gas exchange this shift  Outcome: Progressing  Goal: Improved urinary output this shift  Outcome: Progressing  Goal: Reduction in peripheral edema within 24 hours  Outcome: Progressing  Goal: Report improvement of dyspnea/breathlessness this shift  Outcome: Progressing  Goal: Weight from fluid excess reduced over 2-3 days, then stabilize  Outcome: Progressing  Goal: Increase self care and/or family involvement in 24 hours  Outcome: Progressing     Problem: Pain - Adult  Goal: Verbalizes/displays adequate comfort level or baseline comfort level  Outcome: Progressing     Problem: Safety - Adult  Goal: Free from fall injury  Outcome: Progressing     Problem: Discharge Planning  Goal: Discharge to home or other facility with appropriate resources  Outcome: Progressing     Problem: Chronic Conditions and Co-morbidities  Goal: Patient's chronic conditions and co-morbidity symptoms are monitored and maintained or improved  Outcome: Progressing     Problem: Nutrition  Goal: Nutrient intake appropriate for maintaining nutritional needs  Outcome: Progressing     Problem: Respiratory  Goal: Clear  secretions with interventions this shift  Outcome: Progressing  Goal: Minimize anxiety/maximize coping throughout shift  Outcome: Progressing  Goal: Minimal/no exertional discomfort or dyspnea this shift  Outcome: Progressing  Goal: No signs of respiratory distress (eg. Use of accessory muscles. Peds grunting)  Outcome: Progressing  Goal: Wean oxygen to maintain O2 saturation per order/standard this shift  Outcome: Progressing  Goal: Patent airway maintained this shift  Outcome: Progressing  Goal: Tolerate mechanical ventilation evidenced by VS/agitation level this shift  Outcome: Progressing  Goal: Tolerate pulmonary toileting this shift  Outcome: Progressing  Goal: Verbalize decreased shortness of breath this shift  Outcome: Progressing  Goal: Increase self care and/or family involvement in next 24 hours  Outcome: Progressing     Problem: Pain  Goal: Takes deep breaths with improved pain control throughout the shift  Outcome: Progressing  Goal: Turns in bed with improved pain control throughout the shift  Outcome: Progressing  Goal: Walks with improved pain control throughout the shift  Outcome: Progressing  Goal: Performs ADL's with improved pain control throughout shift  Outcome: Progressing  Goal: Participates in PT with improved pain control throughout the shift  Outcome: Progressing  Goal: Free from opioid side effects throughout the shift  Outcome: Progressing  Goal: Free from acute confusion related to pain meds throughout the shift  Outcome: Progressing     Problem: Deep Vein Thrombosis  Goal: I will remain free from complications of deep vein thrombosis and maintain current level of mobility  Outcome: Progressing

## 2025-04-26 VITALS
TEMPERATURE: 98.8 F | RESPIRATION RATE: 18 BRPM | DIASTOLIC BLOOD PRESSURE: 75 MMHG | SYSTOLIC BLOOD PRESSURE: 114 MMHG | BODY MASS INDEX: 38.24 KG/M2 | HEART RATE: 85 BPM | WEIGHT: 307.54 LBS | OXYGEN SATURATION: 94 % | HEIGHT: 75 IN

## 2025-04-26 LAB
ANION GAP SERPL CALCULATED.3IONS-SCNC: 13 MMOL/L (ref 10–20)
BUN SERPL-MCNC: 16 MG/DL (ref 6–23)
CALCIUM SERPL-MCNC: 10.4 MG/DL (ref 8.6–10.3)
CHLORIDE SERPL-SCNC: 103 MMOL/L (ref 98–107)
CO2 SERPL-SCNC: 21 MMOL/L (ref 21–32)
CREAT SERPL-MCNC: 1.23 MG/DL (ref 0.5–1.3)
EGFRCR SERPLBLD CKD-EPI 2021: 66 ML/MIN/1.73M*2
ERYTHROCYTE [DISTWIDTH] IN BLOOD BY AUTOMATED COUNT: 13.9 % (ref 11.5–14.5)
GLUCOSE SERPL-MCNC: 107 MG/DL (ref 74–99)
HCT VFR BLD AUTO: 43.6 % (ref 41–52)
HGB BLD-MCNC: 13.5 G/DL (ref 13.5–17.5)
MCH RBC QN AUTO: 27.6 PG (ref 26–34)
MCHC RBC AUTO-ENTMCNC: 31 G/DL (ref 32–36)
MCV RBC AUTO: 89 FL (ref 80–100)
NRBC BLD-RTO: 0 /100 WBCS (ref 0–0)
PLATELET # BLD AUTO: 236 X10*3/UL (ref 150–450)
POTASSIUM SERPL-SCNC: 5.1 MMOL/L (ref 3.5–5.3)
RBC # BLD AUTO: 4.9 X10*6/UL (ref 4.5–5.9)
SODIUM SERPL-SCNC: 132 MMOL/L (ref 136–145)
STAPHYLOCOCCUS SPEC CULT: NORMAL
WBC # BLD AUTO: 7.3 X10*3/UL (ref 4.4–11.3)

## 2025-04-26 PROCEDURE — 80048 BASIC METABOLIC PNL TOTAL CA: CPT | Performed by: STUDENT IN AN ORGANIZED HEALTH CARE EDUCATION/TRAINING PROGRAM

## 2025-04-26 PROCEDURE — 85027 COMPLETE CBC AUTOMATED: CPT | Performed by: STUDENT IN AN ORGANIZED HEALTH CARE EDUCATION/TRAINING PROGRAM

## 2025-04-26 PROCEDURE — 2500000001 HC RX 250 WO HCPCS SELF ADMINISTERED DRUGS (ALT 637 FOR MEDICARE OP): Performed by: REGISTERED NURSE

## 2025-04-26 PROCEDURE — 2500000001 HC RX 250 WO HCPCS SELF ADMINISTERED DRUGS (ALT 637 FOR MEDICARE OP): Performed by: STUDENT IN AN ORGANIZED HEALTH CARE EDUCATION/TRAINING PROGRAM

## 2025-04-26 PROCEDURE — 94640 AIRWAY INHALATION TREATMENT: CPT

## 2025-04-26 PROCEDURE — 36415 COLL VENOUS BLD VENIPUNCTURE: CPT | Performed by: STUDENT IN AN ORGANIZED HEALTH CARE EDUCATION/TRAINING PROGRAM

## 2025-04-26 PROCEDURE — 2500000002 HC RX 250 W HCPCS SELF ADMINISTERED DRUGS (ALT 637 FOR MEDICARE OP, ALT 636 FOR OP/ED): Performed by: STUDENT IN AN ORGANIZED HEALTH CARE EDUCATION/TRAINING PROGRAM

## 2025-04-26 PROCEDURE — 99239 HOSP IP/OBS DSCHRG MGMT >30: CPT | Performed by: NURSE PRACTITIONER

## 2025-04-26 PROCEDURE — 99233 SBSQ HOSP IP/OBS HIGH 50: CPT | Performed by: INTERNAL MEDICINE

## 2025-04-26 PROCEDURE — 2500000005 HC RX 250 GENERAL PHARMACY W/O HCPCS: Performed by: STUDENT IN AN ORGANIZED HEALTH CARE EDUCATION/TRAINING PROGRAM

## 2025-04-26 RX ORDER — AZITHROMYCIN 250 MG/1
250 TABLET, FILM COATED ORAL ONCE
Qty: 2 TABLET | Refills: 0 | Status: SHIPPED | OUTPATIENT
Start: 2025-04-26 | End: 2025-04-26

## 2025-04-26 RX ORDER — CEFUROXIME AXETIL 500 MG/1
500 TABLET ORAL 2 TIMES DAILY
Qty: 8 TABLET | Refills: 0 | Status: SHIPPED | OUTPATIENT
Start: 2025-04-26

## 2025-04-26 RX ORDER — ALBUTEROL SULFATE 90 UG/1
2 INHALANT RESPIRATORY (INHALATION) EVERY 4 HOURS PRN
Qty: 6.7 G | Refills: 0 | Status: SHIPPED | OUTPATIENT
Start: 2025-04-26

## 2025-04-26 RX ADMIN — ASPIRIN 81 MG: 81 TABLET, COATED ORAL at 09:26

## 2025-04-26 RX ADMIN — IPRATROPIUM BROMIDE AND ALBUTEROL SULFATE 3 ML: 2.5; .5 SOLUTION RESPIRATORY (INHALATION) at 07:11

## 2025-04-26 RX ADMIN — ACETAMINOPHEN 650 MG: 325 TABLET, FILM COATED ORAL at 09:32

## 2025-04-26 RX ADMIN — IPRATROPIUM BROMIDE AND ALBUTEROL SULFATE 3 ML: 2.5; .5 SOLUTION RESPIRATORY (INHALATION) at 12:42

## 2025-04-26 RX ADMIN — Medication 3 L/MIN: at 07:13

## 2025-04-26 RX ADMIN — SACUBITRIL AND VALSARTAN 1 TABLET: 24; 26 TABLET, FILM COATED ORAL at 09:26

## 2025-04-26 RX ADMIN — BUPROPION HYDROCHLORIDE 300 MG: 300 TABLET, EXTENDED RELEASE ORAL at 09:26

## 2025-04-26 RX ADMIN — APIXABAN 5 MG: 5 TABLET, FILM COATED ORAL at 06:27

## 2025-04-26 RX ADMIN — DULOXETINE HYDROCHLORIDE 30 MG: 30 CAPSULE, DELAYED RELEASE ORAL at 09:26

## 2025-04-26 ASSESSMENT — COGNITIVE AND FUNCTIONAL STATUS - GENERAL
DRESSING REGULAR LOWER BODY CLOTHING: A LITTLE
WALKING IN HOSPITAL ROOM: A LITTLE
DAILY ACTIVITIY SCORE: 23
MOBILITY SCORE: 22
CLIMB 3 TO 5 STEPS WITH RAILING: A LITTLE

## 2025-04-26 ASSESSMENT — PAIN SCALES - GENERAL
PAINLEVEL_OUTOF10: 5 - MODERATE PAIN
PAINLEVEL_OUTOF10: 3

## 2025-04-26 ASSESSMENT — PAIN DESCRIPTION - LOCATION: LOCATION: GENERALIZED

## 2025-04-26 NOTE — DISCHARGE SUMMARY
Discharge Diagnosis  Acute hypoxic respiratory failure           Issues Requiring Follow-Up  Follow up with pulmonology and PCP    Discharge Meds     Medication List      PAUSE taking these medications     paliperidone 1.5 mg 24 hr tablet; Wait to take this until your doctor or   other care provider tells you to start again.; Commonly known as: Invega     START taking these medications     albuterol 90 mcg/actuation inhaler; Commonly known as: Proventil HFA;   Inhale 2 puffs every 4 hours if needed for wheezing or shortness of   breath.   azithromycin 250 mg tablet; Commonly known as: Zithromax; Take 1 tablet   (250 mg) by mouth 1 time for 1 dose. Take 1 tablet daily for 2 days   cefuroxime 500 mg tablet; Commonly known as: Ceftin; Take 1 tablet (500   mg) by mouth 2 times a day.     CONTINUE taking these medications     alendronate 10 mg tablet; Commonly known as: Fosamax; Take 1 tablet (10   mg) by mouth once daily.   apixaban 5 mg tablet; Commonly known as: Eliquis; Take 1 tablet (5 mg)   by mouth 2 times a day.   aspirin 81 mg EC tablet; Take 1 tablet (81 mg) by mouth once daily.   buPROPion  mg 24 hr tablet; Commonly known as: Wellbutrin XL; Take   1 tablet (300 mg) by mouth once daily. Do not crush, chew, or split. Total   dose 450mg   cholecalciferol 125 mcg (5,000 units) tablet; Commonly known as: Vitamin   D-3   DULoxetine 30 mg DR capsule; Commonly known as: Cymbalta   empagliflozin 10 mg tablet; Commonly known as: Jardiance; Take 1 tablet   (10 mg) by mouth once daily.   metFORMIN  mg 24 hr tablet; Commonly known as: Glucophage-XR; Take   1 tablet (500 mg) by mouth once daily in the evening. Take with meals. Do   not crush, chew, or split.   metoprolol succinate XL 25 mg 24 hr tablet; Commonly known as:   Toprol-XL; Take 1 tablet (25 mg) by mouth once daily in the evening. Do   not crush or chew.   primidone 250 mg tablet; Commonly known as: Mysoline; Take 1 tablet (250   mg) by mouth once  daily at bedtime.   sacubitriL-valsartan 24-26 mg tablet; Commonly known as: Entresto; Take   1 tablet by mouth 2 times a day.       Test Results Pending At Discharge  Pending Labs       Order Current Status    Histoplasma Antigen, Non-Blood In process    Mycoplasma pneumoniae antibody, IgM In process            Hospital Course   He reports acute onset productive cough and sore throat with progressive shortness of breath beginning Monday night. He had chills, subjective fever and woke up drenched in sweat Tuesday. The difficulty breathing combined with some starting chest discomfort prompted him to present to the [facility] ER. He denies any sick contacts, nausea/vomiting or headache. He denies having had a pneumonia vaccine before. He was transferred to [facility] due to bed availability     His history is notable for HF with recovered EF, SVT(s/p ablation), recent pulmonary emboli on Eliquis, as well as pulmonary nodule and enlarged lymph nodes. He follows with pulm and cards.    Patient was seen by pulmonology, received IV ATB and switched to oral ATB. Patient was weaned to RA, RT evaluation done for home O2. Patient did not qualify for home O2. Patient will need OP follow up with pulmonology and PCP.     Pertinent Physical Exam At Time of Discharge  Physical Exam  Vitals reviewed.   Constitutional:       Appearance: He is obese.   HENT:      Head: Normocephalic.      Nose: Nose normal.      Mouth/Throat:      Mouth: Mucous membranes are moist.   Eyes:      Extraocular Movements: Extraocular movements intact.   Cardiovascular:      Rate and Rhythm: Regular rhythm.   Pulmonary:      Effort: Pulmonary effort is normal.   Abdominal:      General: Bowel sounds are normal.   Musculoskeletal:         General: Normal range of motion.      Cervical back: Neck supple.   Skin:     General: Skin is warm.   Neurological:      Mental Status: He is alert and oriented to person, place, and time.         Outpatient  Follow-Up  Future Appointments   Date Time Provider Department Center   5/15/2025  2:00 PM DANIELLE [facility] RM [id number] [facility]   5/20/2025  3:20 PM Benjamin Dowell MD [id number] [facility]   5/30/2025  1:20 PM Chelsea Nieves DO [id number] [facility]   8/6/2025  2:00 PM Walt Boss DO [id number] [facility]         Anastasia Coles, APRN-CNP

## 2025-04-26 NOTE — CARE PLAN
The patient's goals for the shift include know discharge plan.    The clinical goals for the shift include pain control, maintain safety, comfort, encourage OOB to chair/ambulation, attempt to wean of supplemental O2, assist with safe discharge plan, and monitor labs/VS.     Problem: Fall/Injury  Goal: Not fall by end of shift  Outcome: Progressing  Goal: Be free from injury by end of the shift  Outcome: Progressing  Goal: Verbalize understanding of personal risk factors for fall in the hospital  Outcome: Progressing  Goal: Verbalize understanding of risk factor reduction measures to prevent injury from fall in the home  Outcome: Progressing  Goal: Use assistive devices by end of the shift  Outcome: Progressing  Goal: Pace activities to prevent fatigue by end of the shift  Outcome: Progressing     Problem: Heart Failure  Goal: Improved gas exchange this shift  Outcome: Progressing  Goal: Improved urinary output this shift  Outcome: Progressing  Goal: Reduction in peripheral edema within 24 hours  Outcome: Progressing  Goal: Report improvement of dyspnea/breathlessness this shift  Outcome: Progressing  Goal: Weight from fluid excess reduced over 2-3 days, then stabilize  Outcome: Progressing  Goal: Increase self care and/or family involvement in 24 hours  Outcome: Progressing     Problem: Pain - Adult  Goal: Verbalizes/displays adequate comfort level or baseline comfort level  Outcome: Progressing     Problem: Safety - Adult  Goal: Free from fall injury  Outcome: Progressing     Problem: Discharge Planning  Goal: Discharge to home or other facility with appropriate resources  Outcome: Progressing     Problem: Chronic Conditions and Co-morbidities  Goal: Patient's chronic conditions and co-morbidity symptoms are monitored and maintained or improved  Outcome: Progressing     Problem: Nutrition  Goal: Nutrient intake appropriate for maintaining nutritional needs  Outcome: Progressing     Problem: Respiratory  Goal:  Clear secretions with interventions this shift  Outcome: Progressing  Goal: Minimize anxiety/maximize coping throughout shift  Outcome: Progressing  Goal: Minimal/no exertional discomfort or dyspnea this shift  Outcome: Progressing  Goal: No signs of respiratory distress (eg. Use of accessory muscles. Peds grunting)  Outcome: Progressing  Goal: Wean oxygen to maintain O2 saturation per order/standard this shift  Outcome: Progressing  Goal: Patent airway maintained this shift  Outcome: Progressing  Goal: Tolerate mechanical ventilation evidenced by VS/agitation level this shift  Outcome: Progressing  Goal: Tolerate pulmonary toileting this shift  Outcome: Progressing  Goal: Verbalize decreased shortness of breath this shift  Outcome: Progressing  Goal: Increase self care and/or family involvement in next 24 hours  Outcome: Progressing     Problem: Pain  Goal: Takes deep breaths with improved pain control throughout the shift  Outcome: Progressing  Goal: Turns in bed with improved pain control throughout the shift  Outcome: Progressing  Goal: Walks with improved pain control throughout the shift  Outcome: Progressing  Goal: Performs ADL's with improved pain control throughout shift  Outcome: Progressing  Goal: Participates in PT with improved pain control throughout the shift  Outcome: Progressing  Goal: Free from opioid side effects throughout the shift  Outcome: Progressing  Goal: Free from acute confusion related to pain meds throughout the shift  Outcome: Progressing     Problem: Deep Vein Thrombosis  Goal: I will remain free from complications of deep vein thrombosis and maintain current level of mobility  Outcome: Progressing

## 2025-04-26 NOTE — PROGRESS NOTES
"Brandin Barfield is a 64 y.o. male on day 3 of admission seen in follow-up for acute hypoxic respiratory failure, pneumonia, lung nodule    Subjective   Sitting up in chair.  2 L oxygen.  He has mild epistaxis cough is dry now.  He has no chest pain and shortness of breath is at baseline.    Objective     Physical Exam  Vitals and nursing note reviewed.   Constitutional:       Appearance: He is obese.   HENT:      Head: Normocephalic and atraumatic.      Nose: Nose normal.      Mouth/Throat:      Mouth: Mucous membranes are moist.   Eyes:      Extraocular Movements: Extraocular movements intact.      Conjunctiva/sclera: Conjunctivae normal.      Pupils: Pupils are equal, round, and reactive to light.   Cardiovascular:      Rate and Rhythm: Normal rate and regular rhythm.      Pulses: Normal pulses.      Heart sounds: Normal heart sounds.   Pulmonary:      Effort: Pulmonary effort is normal.      Breath sounds: Normal breath sounds.   Abdominal:      General: Bowel sounds are normal.      Palpations: Abdomen is soft.   Musculoskeletal:         General: Normal range of motion.   Skin:     General: Skin is warm and dry.      Capillary Refill: Capillary refill takes less than 2 seconds.   Neurological:      General: No focal deficit present.      Mental Status: He is alert and oriented to person, place, and time.   Psychiatric:         Mood and Affect: Mood normal.         Behavior: Behavior normal.         Last Recorded Vitals  Blood pressure 99/74, pulse 88, temperature 36.9 °C (98.4 °F), temperature source Temporal, resp. rate 19, height 1.905 m (6' 3\"), weight 140 kg (307 lb 8.7 oz), SpO2 93%.      Intake/Output Summary (Last 24 hours) at 4/26/2025 1050  Last data filed at 4/26/2025 0930  Gross per 24 hour   Intake 950 ml   Output 1250 ml   Net -300 ml      Scheduled medications:  apixaban, 5 mg, oral, BID  aspirin, 81 mg, oral, Daily  azithromycin, 250 mg, intravenous, q24h  buPROPion XL, 300 mg, oral, " Daily  cefTRIAXone, 1 g, intravenous, q24h  DULoxetine, 30 mg, oral, BID  ipratropium-albuteroL, 3 mL, nebulization, q6h while awake  metoprolol succinate XL, 25 mg, oral, q PM  oxygen, , inhalation, Continuous - Inhalation  primidone, 250 mg, oral, Nightly  sacubitriL-valsartan, 1 tablet, oral, BID    PRN medications: acetaminophen, ipratropium-albuteroL, phenoL    Relevant Results  Results for orders placed or performed during the hospital encounter of 04/23/25 (from the past 24 hours)   CBC   Result Value Ref Range    WBC 7.3 4.4 - 11.3 x10*3/uL    nRBC 0.0 0.0 - 0.0 /100 WBCs    RBC 4.90 4.50 - 5.90 x10*6/uL    Hemoglobin 13.5 13.5 - 17.5 g/dL    Hematocrit 43.6 41.0 - 52.0 %    MCV 89 80 - 100 fL    MCH 27.6 26.0 - 34.0 pg    MCHC 31.0 (L) 32.0 - 36.0 g/dL    RDW 13.9 11.5 - 14.5 %    Platelets 236 150 - 450 x10*3/uL   Basic Metabolic Panel   Result Value Ref Range    Glucose 107 (H) 74 - 99 mg/dL    Sodium 132 (L) 136 - 145 mmol/L    Potassium 5.1 3.5 - 5.3 mmol/L    Chloride 103 98 - 107 mmol/L    Bicarbonate 21 21 - 32 mmol/L    Anion Gap 13 10 - 20 mmol/L    Urea Nitrogen 16 6 - 23 mg/dL    Creatinine 1.23 0.50 - 1.30 mg/dL    eGFR 66 >60 mL/min/1.73m*2    Calcium 10.4 (H) 8.6 - 10.3 mg/dL        CT angio chest for pulmonary embolism  Result Date: 4/23/2025  FINDINGS: POTENTIAL LIMITATIONS OF THE STUDY: Images are somewhat limited by suboptimal contrast bolus timing, with preferential enhancement of the systemic arterial system instead of the pulmonary arterial system.   HEART AND VESSELS: Limited evaluation due to suboptimal contrast bolus timing with preferential enhancement of the pulmonary venous instead of the pulmonary arterial system. Within limits of the study, there are subtle filling defects partially visualized in the lobar artery supplying the right middle and right lower lobe, likely representing resolving emboli seen on previous exam on 03/04/2025. No new large central embolism is identified.  Main pulmonary artery and its branches are normal in caliber.   The thoracic aorta normal in course and caliber.Minimal vascular calcifications are present in the thoracic aorta.Although, the study is not tailored for evaluation of aorta, there is no definite evidence of acute aortic pathology. Moderate coronary artery calcifications are seen. Please note,the study is not optimized for evaluation of coronary arteries.   The cardiac chambers are not enlarged.There are no findings to suggest right heart strain. There is no pericardial effusion seen.   MEDIASTINUM AND OSORIO, LOWER NECK AND AXILLA: The visualized thyroid gland is within normal limits. Several mildly enlarged mediastinal lymph nodes are present, measuring up to 1.4 cm in size in the right paratracheal space, up to 1.7 cm in size in the subcarinal space, and up to 1.7 cm in size in the right hilum. These are similar to prior study on 03/04/2025. Esophagus appears within normal limits as seen.   LUNGS AND AIRWAYS: The trachea and central airways are patent. No endobronchial lesion is seen.   There is redemonstration of the a 1.5 cm somewhat irregular shaped nodule in the right upper lobe (series 9, image 96), similar in size to prior exam, with new patchy nodular airspace opacity is present slightly more inferior in the right upper lobe (series 9, image 130) compared to prior exam on 03/04/2025.   Mild atelectatic changes are present in the lower lobes bilaterally without evidence of consolidation or pleural effusion. No pneumothorax is present.   UPPER ABDOMEN: The visualized subdiaphragmatic structures demonstrate no remarkable findings.   CHEST WALL AND OSSEOUS STRUCTURES: Chest wall is within normal limits. No acute osseous pathology.There are no suspicious osseous lesions.Chronic compression deformity along superior endplate of L1 is similar to prior exam. Chronic compression deformity along superior endplate of T4 is also similar to prior study.   No  acute abnormalities identified in the thoracic spine. 1. Limited examination due to suboptimal contrast bolus timing, with preferential enhancement of the pulmonary venous system instead of the pulmonary arterial system. Within limits of the exam, there is suggestion of the faint linear filling defects present within the lobar arteries supplying the right middle and right lower lobe, likely representing resolving pulmonary emboli previously visualized on 03/04/2025. No new large central saddle embolism is present in the interim since prior exam. 2. Subtle new patchy airspace opacities are present in the inferior aspect of the right upper lobe in the interim since prior study on 03/04/2025, suggestive of developing infiltrate/pneumonia. 3. A 1.5 cm somewhat irregular shaped solid nodule in the right upper lobe is similar in appearance to prior exam. 4. Several nonspecific enlarged mediastinal lymph nodes are similar to prior study, including subcarinal and right paratracheal lymph node, which measures up to 1.7 cm in short axis dimension.         Assessment & Plan    Acute hypoxic respiratory failure  Wean oxygen as sats allow  Continuous pulse oximetry  Incentive spirometry/pulmonary hygiene     Pneumonia of right upper lobe  Likely healthcare acquired  Continue IV azithromycin, IV ceftriaxone  Sputum culture pending  Urine Legionella, strep pneumo normal  Histoplasma, T-spot, mycoplasma, MRSA, pending  Follow-up cultures     Chronic systolic congestive heart failure with pEF  2 D echo 3/5/25 with estimated EF: 50-55% without evidence of right heart strain     Right upper lobe lung nodule  PET-CT scan 3/20/25 showed right upper lobe nodule nodule with right hilar mediastinal lymph nodes followed by EBUS on 4/7/25 and no malignant cells were identified  Nodify test pending   Follow-up with Dr. Nieves as outpatient     History of multifocal bilateral pulmonary emboli   Is currently on apixaban 5 mg twice daily    obesity  He stated that he had multiple sleep studies and all came back negative for sleep apnea  Follow-up with his pulmonology     Prophylaxis  Eliquis Tarek R Gharibeh, MD  Pulmonary & Critical Care Medicine   4/26/2025

## 2025-04-26 NOTE — CARE PLAN
The patient's goals for the shift include rest    The clinical goals for the shift include maintain safety, comfort, encourage OOB to chair/ambulation, attempt to wean of supplemental O2 assist with safe discharge plan, and monitor labs/VS      Problem: Pain - Adult  Goal: Verbalizes/displays adequate comfort level or baseline comfort level  Outcome: Progressing     Problem: Safety - Adult  Goal: Free from fall injury  Outcome: Progressing     Problem: Respiratory  Goal: Clear secretions with interventions this shift  Outcome: Progressing  Goal: Minimize anxiety/maximize coping throughout shift  Outcome: Progressing  Goal: Minimal/no exertional discomfort or dyspnea this shift  Outcome: Progressing  Goal: No signs of respiratory distress (eg. Use of accessory muscles. Peds grunting)  Outcome: Progressing  Goal: Wean oxygen to maintain O2 saturation per order/standard this shift  Outcome: Progressing  Goal: Patent airway maintained this shift  Outcome: Progressing  Goal: Tolerate mechanical ventilation evidenced by VS/agitation level this shift  Outcome: Progressing  Goal: Tolerate pulmonary toileting this shift  Outcome: Progressing  Goal: Verbalize decreased shortness of breath this shift  Outcome: Progressing  Goal: Increase self care and/or family involvement in next 24 hours  Outcome: Progressing

## 2025-04-27 LAB
BACTERIA BLD CULT: NORMAL
BACTERIA BLD CULT: NORMAL
M PNEUMO IGM SER IA-ACNC: 0.69 U/L

## 2025-04-28 LAB
FUNGUS SPEC CULT: NORMAL
FUNGUS SPEC FUNGUS STN: NORMAL
H CAPSUL AG UR QL: NOT DETECTED
SCAN RESULT: NORMAL

## 2025-04-29 ENCOUNTER — PATIENT OUTREACH (OUTPATIENT)
Dept: PRIMARY CARE | Facility: CLINIC | Age: 65
End: 2025-04-29
Payer: MEDICAID

## 2025-04-29 NOTE — PROGRESS NOTES
Discharge Facility:  [facility]   Discharge Diagnosis:  acute hypoxic respiratory failure  Admission Date: 4/23/25  Discharge Date:  4/26/25    PCP Appointment Date: 5/20/25  Specialist Appointment Date: pulmonology 5/30/25  Hospital Encounter and Summary Linked: Yes  Admission (Discharged) with Morelia Ding MD (04/23/2025)     At least two attempts were made to reach patient within two business days after discharge. If available, left voicemail with contact information for patient to call back with any non-emergent questions or concerns.

## 2025-04-30 LAB
ACID FAST STN SPEC: NORMAL
MYCOBACTERIUM SPEC CULT: NORMAL

## 2025-05-01 ENCOUNTER — TELEPHONE (OUTPATIENT)
Dept: INPATIENT UNIT | Facility: HOSPITAL | Age: 65
End: 2025-05-01
Payer: MEDICAID

## 2025-05-06 ENCOUNTER — TELEPHONE (OUTPATIENT)
Dept: PULMONOLOGY | Facility: CLINIC | Age: 65
End: 2025-05-06
Payer: MEDICAID

## 2025-05-07 LAB
ACID FAST STN SPEC: NORMAL
MYCOBACTERIUM SPEC CULT: NORMAL

## 2025-05-09 ENCOUNTER — TELEPHONE (OUTPATIENT)
Dept: PULMONOLOGY | Facility: CLINIC | Age: 65
End: 2025-05-09
Payer: MEDICAID

## 2025-05-09 NOTE — TELEPHONE ENCOUNTER
2nd attempt to discuss Nodify XL2 results with patient. No answer. Left voicemail with office callback number.

## 2025-05-13 ENCOUNTER — TELEPHONE (OUTPATIENT)
Dept: PULMONOLOGY | Facility: CLINIC | Age: 65
End: 2025-05-13
Payer: MEDICAID

## 2025-05-13 ENCOUNTER — PATIENT OUTREACH (OUTPATIENT)
Dept: PRIMARY CARE | Facility: CLINIC | Age: 65
End: 2025-05-13
Payer: MEDICAID

## 2025-05-13 NOTE — PROGRESS NOTES
TCM RN Outreach   Unable to reach patient for follow up call after recent hospitalization.   Left voicemail with call back number for patient to call if needed   If no voicemail available call attempts x 2 were made to contact the patient to assist with any questions or concerns patient may have.

## 2025-05-14 LAB
ACID FAST STN SPEC: NORMAL
MYCOBACTERIUM SPEC CULT: NORMAL

## 2025-05-15 ENCOUNTER — HOSPITAL ENCOUNTER (OUTPATIENT)
Dept: RESPIRATORY THERAPY | Facility: HOSPITAL | Age: 65
Discharge: HOME | End: 2025-05-15
Payer: MEDICAID

## 2025-05-15 ENCOUNTER — APPOINTMENT (OUTPATIENT)
Dept: PRIMARY CARE | Facility: CLINIC | Age: 65
End: 2025-05-15
Payer: MEDICAID

## 2025-05-15 DIAGNOSIS — I26.99 PULMONARY EMBOLISM AND INFARCTION (MULTI): ICD-10-CM

## 2025-05-15 PROCEDURE — 2500000001 HC RX 250 WO HCPCS SELF ADMINISTERED DRUGS (ALT 637 FOR MEDICARE OP): Performed by: INTERNAL MEDICINE

## 2025-05-15 PROCEDURE — 94726 PLETHYSMOGRAPHY LUNG VOLUMES: CPT | Performed by: INTERNAL MEDICINE

## 2025-05-15 PROCEDURE — 94729 DIFFUSING CAPACITY: CPT | Performed by: INTERNAL MEDICINE

## 2025-05-15 PROCEDURE — 94726 PLETHYSMOGRAPHY LUNG VOLUMES: CPT

## 2025-05-15 PROCEDURE — 94060 EVALUATION OF WHEEZING: CPT | Performed by: INTERNAL MEDICINE

## 2025-05-15 RX ORDER — ALBUTEROL SULFATE 90 UG/1
4 INHALANT RESPIRATORY (INHALATION) ONCE
Status: COMPLETED | OUTPATIENT
Start: 2025-05-15 | End: 2025-05-15

## 2025-05-15 RX ORDER — ALBUTEROL SULFATE 0.83 MG/ML
3 SOLUTION RESPIRATORY (INHALATION) ONCE
Status: COMPLETED | OUTPATIENT
Start: 2025-05-15 | End: 2025-05-15

## 2025-05-15 RX ADMIN — ALBUTEROL SULFATE 4 PUFF: 90 AEROSOL, METERED RESPIRATORY (INHALATION) at 14:30

## 2025-05-16 ENCOUNTER — TELEPHONE (OUTPATIENT)
Dept: PULMONOLOGY | Facility: CLINIC | Age: 65
End: 2025-05-16
Payer: MEDICAID

## 2025-05-16 DIAGNOSIS — R91.1 PULMONARY NODULE: Primary | ICD-10-CM

## 2025-05-16 LAB
ANION GAP SERPL CALCULATED.4IONS-SCNC: 9 MMOL/L (CALC) (ref 7–17)
BUN SERPL-MCNC: 25 MG/DL (ref 7–25)
BUN/CREAT SERPL: ABNORMAL (CALC) (ref 6–22)
CALCIUM SERPL-MCNC: 10.8 MG/DL (ref 8.6–10.3)
CHLORIDE SERPL-SCNC: 106 MMOL/L (ref 98–110)
CO2 SERPL-SCNC: 22 MMOL/L (ref 20–32)
CREAT SERPL-MCNC: 1.22 MG/DL (ref 0.7–1.35)
EGFRCR SERPLBLD CKD-EPI 2021: 66 ML/MIN/1.73M2
ERYTHROCYTE [DISTWIDTH] IN BLOOD BY AUTOMATED COUNT: 14.2 % (ref 11–15)
GLUCOSE SERPL-MCNC: 99 MG/DL (ref 65–99)
HCT VFR BLD AUTO: 48 % (ref 38.5–50)
HGB BLD-MCNC: 15.6 G/DL (ref 13.2–17.1)
MCH RBC QN AUTO: 28.1 PG (ref 27–33)
MCHC RBC AUTO-ENTMCNC: 32.5 G/DL (ref 32–36)
MCV RBC AUTO: 86.5 FL (ref 80–100)
PLATELET # BLD AUTO: 290 THOUSAND/UL (ref 140–400)
PMV BLD REES-ECKER: 10.1 FL (ref 7.5–12.5)
POTASSIUM SERPL-SCNC: 4.7 MMOL/L (ref 3.5–5.3)
PSA FREE MFR SERPL: 31 % (CALC)
PSA FREE SERPL-MCNC: 1.3 NG/ML
PSA SERPL-MCNC: 4.2 NG/ML
RBC # BLD AUTO: 5.55 MILLION/UL (ref 4.2–5.8)
SODIUM SERPL-SCNC: 137 MMOL/L (ref 135–146)
WBC # BLD AUTO: 6.1 THOUSAND/UL (ref 3.8–10.8)

## 2025-05-16 NOTE — TELEPHONE ENCOUNTER
Spoke with patient to discuss results of Nodify XL2 testing. Risk of RUL nodule reduced from 28% to 17%, however, NPV was 90%. This warrants closer follow up. Will be referring to Dr. Perez for thoracic surgical opinion. Has appointment coming up with me on 5/30/2025. Patient voiced understanding. Overall he feels better since his recent admission. PFT results not available yet.    Orders Placed This Encounter   Procedures    Referral to Thoracic Surgery     Standing Status:   Future     Expected Date:   5/16/2025     Expiration Date:   5/16/2026     Referral Priority:   Routine     Referral Type:   Consultation     Referral Reason:   Specialty Services Required     Referred to Provider:   Ahsan Obrien MD     Requested Specialty:   Thoracic Surgery     Number of Visits Requested:   1

## 2025-05-20 ENCOUNTER — APPOINTMENT (OUTPATIENT)
Dept: PRIMARY CARE | Facility: CLINIC | Age: 65
End: 2025-05-20
Payer: MEDICAID

## 2025-05-20 VITALS
BODY MASS INDEX: 38.02 KG/M2 | TEMPERATURE: 97.8 F | WEIGHT: 304.2 LBS | DIASTOLIC BLOOD PRESSURE: 74 MMHG | SYSTOLIC BLOOD PRESSURE: 114 MMHG | HEART RATE: 72 BPM

## 2025-05-20 DIAGNOSIS — M75.02 ADHESIVE CAPSULITIS OF LEFT SHOULDER: ICD-10-CM

## 2025-05-20 DIAGNOSIS — E21.3 HYPERPARATHYROIDISM (MULTI): ICD-10-CM

## 2025-05-20 DIAGNOSIS — L21.9 SEBORRHEIC DERMATITIS: ICD-10-CM

## 2025-05-20 DIAGNOSIS — M25.512 CHRONIC LEFT SHOULDER PAIN: ICD-10-CM

## 2025-05-20 DIAGNOSIS — G62.89 OTHER POLYNEUROPATHY: Primary | ICD-10-CM

## 2025-05-20 DIAGNOSIS — G89.29 CHRONIC LEFT SHOULDER PAIN: ICD-10-CM

## 2025-05-20 DIAGNOSIS — R20.2 PINS AND NEEDLES SENSATION: ICD-10-CM

## 2025-05-20 DIAGNOSIS — G25.0 BENIGN ESSENTIAL TREMOR: ICD-10-CM

## 2025-05-20 DIAGNOSIS — I42.0 DILATED CARDIOMYOPATHY (MULTI): ICD-10-CM

## 2025-05-20 PROCEDURE — 3078F DIAST BP <80 MM HG: CPT | Performed by: INTERNAL MEDICINE

## 2025-05-20 PROCEDURE — 99214 OFFICE O/P EST MOD 30 MIN: CPT | Performed by: INTERNAL MEDICINE

## 2025-05-20 PROCEDURE — 3074F SYST BP LT 130 MM HG: CPT | Performed by: INTERNAL MEDICINE

## 2025-05-20 PROCEDURE — 1036F TOBACCO NON-USER: CPT | Performed by: INTERNAL MEDICINE

## 2025-05-20 PROCEDURE — 84155 ASSAY OF PROTEIN SERUM: CPT

## 2025-05-20 PROCEDURE — 84165 PROTEIN E-PHORESIS SERUM: CPT

## 2025-05-20 RX ORDER — METOPROLOL SUCCINATE 50 MG/1
50 TABLET, EXTENDED RELEASE ORAL EVERY EVENING
Qty: 90 TABLET | Refills: 3 | Status: SHIPPED | OUTPATIENT
Start: 2025-05-20 | End: 2026-05-20

## 2025-05-20 RX ORDER — KETOCONAZOLE 20 MG/ML
SHAMPOO, SUSPENSION TOPICAL 2 TIMES WEEKLY
Qty: 120 ML | Refills: 1 | Status: SHIPPED | OUTPATIENT
Start: 2025-05-22

## 2025-05-20 RX ORDER — GABAPENTIN 100 MG/1
100 CAPSULE ORAL 3 TIMES DAILY
Qty: 90 CAPSULE | Refills: 2 | Status: SHIPPED | OUTPATIENT
Start: 2025-05-20 | End: 2025-08-18

## 2025-05-20 ASSESSMENT — ENCOUNTER SYMPTOMS
FEVER: 0
POLYDIPSIA: 0
PALPITATIONS: 0
CHILLS: 0
COUGH: 0
SHORTNESS OF BREATH: 0

## 2025-05-20 NOTE — ASSESSMENT & PLAN NOTE
Orders:    Referral to Neurology; Future    gabapentin (Neurontin) 100 mg capsule; Take 1 capsule (100 mg) by mouth 3 times a day.

## 2025-05-20 NOTE — ASSESSMENT & PLAN NOTE
Orders:    metoprolol succinate XL (Toprol-XL) 50 mg 24 hr tablet; Take 1 tablet (50 mg) by mouth once daily in the evening. Do not crush or chew.

## 2025-05-20 NOTE — PROGRESS NOTES
Subjective   Patient ID: Brandin Barfield is a 64 y.o. male who presents for Follow-up.    64-year-old male presents today for routine follow-up but he has multiple ongoing issues to discuss at this time.  1.,  The patient's first issue of concern is his chronic essential tremor in which he has been titrated on primidone to 250 mg nightly, he is also on a beta-blocker metoprolol 25 mg daily for his chronic heart condition.  Less optimal option but with his chronic heart disease it is not possible to transition him over to propranolol in the circumstances.  He continues to support the idea that the essential tremor breaks through very commonly and is disruptive to his lifestyle.  He would like to consider alternative forms of treatment or augments to treatment.  We discussed how he has been titrated effectively and had periods of good control but then the medicines seem to reduce their efficacy for him.  I am going to adjust the metoprolol starting a secondary assistant medication in the form of gabapentin and refer him to neurology for case review.    He also has peripheral neuropathy involving bilateral feet 4 or more days per week but not every day.  Episodes are spontaneously active without significant relieving or provoking measures that he is aware of.  He has a history of lumbar back disease with an MRI performed through our healthcare system approximately 1 year ago without significant spinal disease.  There was L5-S1 mild disease noted but given the prevalence of the patient's current symptoms and the level of disease in the past I am not convinced it is entirely related in possible polyneuropathy and the circumstances cannot be ruled out.  Recent blood testing showed no thyroid disease no diabetes additional blood work is necessary.  Given the persistence of symptoms for multiple months per patient report, and prior MRI and EMG would be advised to differentiate between peripheral or central neuropathy.  It  is possible the old MRI identifies the current octave cause of the nerve symptoms but it may also be peripheral polyneuropathy of undetermined source.    The patient also has chronic left shoulder pain with restricted range of motion he is unable to lift his shoulder laterally greater than 30 degrees, anteriorly greater than 45 degrees.  He is pretty much just lived with it for multiple months because he thought it would either a go away or be since he is not currently working it would not be a problem for him.  However the shoulder has become increasingly more frozen and is painful with range of motion actively.  He has not seen anybody regarding this issue gone through physical therapy or had any further evaluation aside from mentioning it today.         Review of Systems   Constitutional:  Negative for chills and fever.   Respiratory:  Negative for cough and shortness of breath.    Cardiovascular:  Negative for chest pain and palpitations.   Endocrine: Negative for polydipsia and polyuria.       Objective   /74   Pulse 72   Temp 36.6 °C (97.8 °F) (Temporal)   Wt 138 kg (304 lb 3.2 oz)   BMI 38.02 kg/m²     Physical Exam  Constitutional:       Appearance: Normal appearance.   HENT:      Head: Normocephalic and atraumatic.   Eyes:      Extraocular Movements: Extraocular movements intact.      Pupils: Pupils are equal, round, and reactive to light.   Neck:      Thyroid: No thyroid mass or thyromegaly.   Cardiovascular:      Rate and Rhythm: Normal rate and regular rhythm.   Pulmonary:      Effort: No respiratory distress.      Breath sounds: No wheezing, rhonchi or rales.   Musculoskeletal:      Cervical back: Neck supple.      Right lower leg: No edema.      Left lower leg: No edema.   Neurological:      Mental Status: He is alert.         Assessment/Plan   Assessment & Plan  Hyperparathyroidism (Multi)  Clinically stable.        Benign essential tremor    Orders:    Referral to Neurology; Future     gabapentin (Neurontin) 100 mg capsule; Take 1 capsule (100 mg) by mouth 3 times a day.    Other polyneuropathy    Orders:    EMG & nerve conduction; Future    Serum Protein Electrophoresis; Future    Vitamin B12; Future    Dilated cardiomyopathy (Multi)    Orders:    metoprolol succinate XL (Toprol-XL) 50 mg 24 hr tablet; Take 1 tablet (50 mg) by mouth once daily in the evening. Do not crush or chew.    Seborrheic dermatitis    Orders:    ketoconazole (NIZOral) 2 % shampoo; Apply topically 2 times a week. Shampoo daily, leave on for 5-10 minutes, then rinse.    Pins and needles sensation    Orders:    EMG & nerve conduction; Future    Serum Protein Electrophoresis; Future    Vitamin B12; Future    Chronic left shoulder pain    Orders:    Referral to Orthopedics and Sports Medicine; Future    Referral to Physical Therapy; Future    XR shoulder left 2+ views; Future    Adhesive capsulitis of left shoulder    Orders:    Referral to Orthopedics and Sports Medicine; Future    Referral to Physical Therapy; Future    XR shoulder left 2+ views; Future

## 2025-05-21 ENCOUNTER — HOSPITAL ENCOUNTER (OUTPATIENT)
Dept: RADIOLOGY | Facility: HOSPITAL | Age: 65
Discharge: HOME | End: 2025-05-21
Payer: MEDICAID

## 2025-05-21 ENCOUNTER — APPOINTMENT (OUTPATIENT)
Dept: LAB | Facility: HOSPITAL | Age: 65
End: 2025-05-21
Payer: MEDICAID

## 2025-05-21 DIAGNOSIS — G89.29 CHRONIC LEFT SHOULDER PAIN: ICD-10-CM

## 2025-05-21 DIAGNOSIS — M75.02 ADHESIVE CAPSULITIS OF LEFT SHOULDER: ICD-10-CM

## 2025-05-21 DIAGNOSIS — M25.512 CHRONIC LEFT SHOULDER PAIN: ICD-10-CM

## 2025-05-21 LAB
ACID FAST STN SPEC: NORMAL
MYCOBACTERIUM SPEC CULT: NORMAL
PROT SERPL-MCNC: 7.2 G/DL (ref 6.4–8.2)
VIT B12 SERPL-MCNC: 611 PG/ML (ref 200–1100)

## 2025-05-21 PROCEDURE — 73030 X-RAY EXAM OF SHOULDER: CPT | Mod: LEFT SIDE | Performed by: RADIOLOGY

## 2025-05-21 PROCEDURE — 73030 X-RAY EXAM OF SHOULDER: CPT | Mod: LT

## 2025-05-21 NOTE — PROGRESS NOTES
Subjective      Chief Complaint   Patient presents with    Left Shoulder - Pain        Surgical History[1]     HPI  This 64 year old patient presents today with left shoulder pain which he rates at 9-10/10 and also left shoulder loss of motion. He was graciously referred to this office by his PCP, Dr. Dowell, for further evaluation and treatment of left shoulder pain. and loss of motor. X-rays taken on 5/20/25 were negative for fracture.The patient states that the left shoulder pain has been present for several months. The patient denies trauma or injury. The patient states that the left shoulder pain is worse with and aggravated by reaching and lifting. The patient states that this shoulder pain  and loss of motion are disabling and presents today to discuss further options. The patient states that they have tried tylenol and is unable to take NSAIDS due to being on Eliquis. He reports no relief with Tylenol.  He has physical therapy scheduled for his left shoulder.     CARDIOLOGY:   Negative for chest pain, shortness of breath.   RESPIRATORY:   Negative for chest pain, shortness of breath.   MUSCULOSKELETAL:   See HPI for details.   NEUROLOGY:   Negative for tingling, numbness, weakness.    Objective      There were no vitals taken for this visit.     SHOULDER EXAM  Constitutional: Appears stated age. No apparent distress  Labored Breathing: No  Psychiatric: Normal mood and effect.   Neurological: alert and oriented x3  Skin: intact  HEENT: No bruising, otorrhea, rhinorrhea.  MUSCULOSKELETAL: Neck: No tenderness. No pain or limitation with range of motion. Back: No tenderness. Straight leg test negative bilaterally. left shoulder: There is tenderness anteriorly and laterally. Active abduction and active flexion are 0- 90 degrees but with pain and guarding. There is pain with and limitation of active and passive internal and external rotation. Comparments are soft. Neurovascular is intact.  right shoulder:  Nontender.  Full active and passive painless range of motion.  XR shoulder left 2+ views  Listed below are reviewed with the patient in the office today.  Result Date: 5/22/2025  Interpreted By:  Georges Rai, STUDY: XR SHOULDER LEFT 2+ VIEWS;  5/21/2025 2:22 pm   INDICATION: Signs/Symptoms:chronic left shoulder pain and frozen shoulder.   COMPARISON: None   ACCESSION NUMBER(S): [id number]   ORDERING CLINICIAN: JOSE IYER   FINDINGS: Interval redemonstration of possible remote fracture deformity of the distal tip of the left clavicle. This appears unchanged since prior exam 11/27/2022 chest radiograph. Moderate AC osteoarthrosis. Mild glenohumeral osteoarthrosis. No fracture or dislocation. No osseous lesion.       No fracture or dislocation. No osseous lesion.     MACRO: none   Signed by: Georges Rai 5/22/2025 2:03 PM Dictation workstation:   [id number]        Subjective    Patient ID: Brandin Barfield is a 64 y.o. male.    Chief Complaint: Pain of the Left Shoulder     Last Surgery: No surgery found  Last Surgery Date: No surgery found     Patient ID: Brandin Barfield is a 64 y.o. male.    L Inj/Asp: L subacromial bursa on 5/29/2025 8:54 AM  Indications: pain  Details: 22 G needle, lateral approach  Medications: 1 mL lidocaine 10 mg/mL (1 %); 10 mg triamcinolone acetonide 40 mg/mL  Outcome: tolerated well, no immediate complications  Procedure, treatment alternatives, risks and benefits explained, specific risks discussed. Immediately prior to procedure a time out was called to verify the correct patient, procedure, equipment, support staff and site/side marked as required. Patient was prepped and draped in the usual sterile fashion.         Options are discussed with the patient in detail. The patient is  Instructed to keep his appointment with physical therapy to evaluate and treat with gentle strengthening and ROM exercises with modalities as needed. The patient is instructed regarding activity  modification and risk for further injury with falling or trauma, ice, provider directed at home gentle strengthening and ROM exercises, and the appropriate use of Tylenol as needed for pain with its potential adverse reactions and side effects. The patient understands.  He states that despite exercises that this left shoulder pain and loss of motion are disabling and he requests a discussion of further options.  Cortisone injection to the left shoulder is discussed in the office today and the patient requests a cortisone injection.  This is done in the office today.  See procedures. Follow up in 6 weeks or sooner as needed. Please note that this report has been produced using speech recognition software.  It may contain errors related to grammar, punctuation or spelling.  Electronically signed, but not reviewed.   Doug Johnson MD           [1]   Past Surgical History:  Procedure Laterality Date    CARDIAC CATHETERIZATION  03/23    CARDIAC ELECTROPHYSIOLOGY STUDY AND ABLATION      CIRCUMCISION, PRIMARY  1960    MR HEAD ANGIO WO IV CONTRAST  06/21/2017    MR HEAD ANGIO WO IV CONTRAST 6/21/2017 [facility]    MR NECK ANGIO WO IV CONTRAST  06/21/2017    MR NECK ANGIO WO IV CONTRAST 6/21/2017 [facility]    OTHER SURGICAL HISTORY  11/07/2022    No history of surgery    WISDOM TOOTH EXTRACTION  2020

## 2025-05-22 LAB
ALBUMIN: 4 G/DL (ref 3.4–5)
ALPHA 1 GLOBULIN: 0.3 G/DL (ref 0.2–0.6)
ALPHA 2 GLOBULIN: 0.8 G/DL (ref 0.4–1.1)
BETA GLOBULIN: 1 G/DL (ref 0.5–1.2)
GAMMA GLOBULIN: 1.1 G/DL (ref 0.5–1.4)
PATH REVIEW-SERUM PROTEIN ELECTROPHORESIS: NORMAL
PROTEIN ELECTROPHORESIS COMMENT: NORMAL

## 2025-05-27 ENCOUNTER — OFFICE VISIT (OUTPATIENT)
Facility: CLINIC | Age: 65
End: 2025-05-27
Payer: MEDICAID

## 2025-05-27 VITALS
WEIGHT: 307 LBS | OXYGEN SATURATION: 97 % | BODY MASS INDEX: 38.37 KG/M2 | SYSTOLIC BLOOD PRESSURE: 129 MMHG | DIASTOLIC BLOOD PRESSURE: 97 MMHG | HEART RATE: 78 BPM

## 2025-05-27 DIAGNOSIS — R91.1 PULMONARY NODULE: ICD-10-CM

## 2025-05-27 PROCEDURE — 3080F DIAST BP >= 90 MM HG: CPT | Performed by: THORACIC SURGERY (CARDIOTHORACIC VASCULAR SURGERY)

## 2025-05-27 PROCEDURE — 1036F TOBACCO NON-USER: CPT | Performed by: THORACIC SURGERY (CARDIOTHORACIC VASCULAR SURGERY)

## 2025-05-27 PROCEDURE — 99205 OFFICE O/P NEW HI 60 MIN: CPT | Performed by: THORACIC SURGERY (CARDIOTHORACIC VASCULAR SURGERY)

## 2025-05-27 PROCEDURE — 3074F SYST BP LT 130 MM HG: CPT | Performed by: THORACIC SURGERY (CARDIOTHORACIC VASCULAR SURGERY)

## 2025-05-27 PROCEDURE — 99215 OFFICE O/P EST HI 40 MIN: CPT | Performed by: THORACIC SURGERY (CARDIOTHORACIC VASCULAR SURGERY)

## 2025-05-27 ASSESSMENT — ENCOUNTER SYMPTOMS
DIAPHORESIS: 0
UNEXPECTED WEIGHT CHANGE: 0
DEPRESSION: 1
ABDOMINAL PAIN: 0
LOSS OF SENSATION IN FEET: 0
FATIGUE: 0
SHORTNESS OF BREATH: 0
DIARRHEA: 0
MUSCULOSKELETAL NEGATIVE: 1
STRIDOR: 0
ALLERGIC/IMMUNOLOGIC NEGATIVE: 1
WHEEZING: 0
NEUROLOGICAL NEGATIVE: 1
FEVER: 0
VOMITING: 0
PSYCHIATRIC NEGATIVE: 1
CHILLS: 0
NAUSEA: 0
EYES NEGATIVE: 1
COUGH: 0
HEMATOLOGIC/LYMPHATIC NEGATIVE: 1
ABDOMINAL DISTENTION: 0
OCCASIONAL FEELINGS OF UNSTEADINESS: 1
CONSTIPATION: 0
CHOKING: 0
PALPITATIONS: 0
APPETITE CHANGE: 0
CHEST TIGHTNESS: 0
ENDOCRINE NEGATIVE: 1

## 2025-05-27 ASSESSMENT — LIFESTYLE VARIABLES: TOTAL SCORE: 0

## 2025-05-27 ASSESSMENT — PAIN SCALES - GENERAL: PAINLEVEL_OUTOF10: 4

## 2025-05-27 NOTE — PROGRESS NOTES
Subjective   Patient ID: Brandin Barfield is a 64 y.o. male who presents for Lung Nodule.  HPI  64-year-old male who was found to have a 1.5 cm right upper lobe lung nodule.  He underwent a PET scan that showed that this nodule had an SUV max of 1.7.  There was some activity in his mediastinal lymph nodes.  He underwent EBUS biopsy of the mediastinal lymph nodes which were negative for cancer.  He has a history of heavy smoking.  His PFTs revealed an FEV1 of 87% predicted and a DLCO of 98% predicted.  I discussion with him about next steps.  And we should biopsy of these right upper lobe lung nodule before making a decision about resection.  He will require lobectomy for it.  I will refer him to interventional radiology for a biopsy of the right upper lobe lung nodule and decide next steps.    Review of Systems   Constitutional:  Negative for appetite change, chills, diaphoresis, fatigue, fever and unexpected weight change.   HENT: Negative.     Eyes: Negative.    Respiratory:  Negative for cough, choking, chest tightness, shortness of breath, wheezing and stridor.    Cardiovascular:  Negative for chest pain, palpitations and leg swelling.   Gastrointestinal:  Negative for abdominal distention, abdominal pain, constipation, diarrhea, nausea and vomiting.   Endocrine: Negative.    Genitourinary: Negative.    Musculoskeletal: Negative.    Skin: Negative.    Allergic/Immunologic: Negative.    Neurological: Negative.    Hematological: Negative.    Psychiatric/Behavioral: Negative.     All other systems reviewed and are negative.      Objective   Physical Exam  Constitutional:       Appearance: Normal appearance.   HENT:      Head: Normocephalic and atraumatic.      Nose: Nose normal.      Mouth/Throat:      Mouth: Mucous membranes are moist.      Pharynx: Oropharynx is clear.   Eyes:      Extraocular Movements: Extraocular movements intact.      Conjunctiva/sclera: Conjunctivae normal.      Pupils: Pupils are equal,  round, and reactive to light.   Cardiovascular:      Rate and Rhythm: Normal rate and regular rhythm.      Pulses: Normal pulses.      Heart sounds: Normal heart sounds.   Pulmonary:      Effort: Pulmonary effort is normal. No respiratory distress.      Breath sounds: Normal breath sounds. No stridor. No wheezing, rhonchi or rales.   Chest:      Chest wall: No tenderness.   Abdominal:      General: Abdomen is flat. Bowel sounds are normal.      Palpations: Abdomen is soft.   Musculoskeletal:         General: Normal range of motion.      Cervical back: Normal range of motion and neck supple.   Skin:     General: Skin is warm and dry.      Capillary Refill: Capillary refill takes less than 2 seconds.   Neurological:      General: No focal deficit present.      Mental Status: He is alert and oriented to person, place, and time.         Assessment/Plan   Diagnoses and all orders for this visit:  Pulmonary nodule  -     Referral to Thoracic Surgery  IR guided biopsy of right upper lobe lung nodule  Follow-up with results.         Ahsan Obrien MD 05/27/25 3:38 PM

## 2025-05-28 LAB
ACID FAST STN SPEC: NORMAL
MYCOBACTERIUM SPEC CULT: NORMAL

## 2025-05-29 ENCOUNTER — OFFICE VISIT (OUTPATIENT)
Dept: ORTHOPEDIC SURGERY | Facility: CLINIC | Age: 65
End: 2025-05-29
Payer: MEDICAID

## 2025-05-29 VITALS — BODY MASS INDEX: 38.17 KG/M2 | HEIGHT: 75 IN | WEIGHT: 307 LBS

## 2025-05-29 DIAGNOSIS — M75.02 ADHESIVE CAPSULITIS OF LEFT SHOULDER: ICD-10-CM

## 2025-05-29 DIAGNOSIS — M75.52 BURSITIS OF LEFT SHOULDER: Primary | ICD-10-CM

## 2025-05-29 DIAGNOSIS — M25.512 LEFT SHOULDER PAIN, UNSPECIFIED CHRONICITY: ICD-10-CM

## 2025-05-29 PROCEDURE — 2500000004 HC RX 250 GENERAL PHARMACY W/ HCPCS (ALT 636 FOR OP/ED): Performed by: ORTHOPAEDIC SURGERY

## 2025-05-29 PROCEDURE — 1036F TOBACCO NON-USER: CPT | Performed by: ORTHOPAEDIC SURGERY

## 2025-05-29 PROCEDURE — 99213 OFFICE O/P EST LOW 20 MIN: CPT | Mod: 25 | Performed by: ORTHOPAEDIC SURGERY

## 2025-05-29 PROCEDURE — 99203 OFFICE O/P NEW LOW 30 MIN: CPT | Performed by: ORTHOPAEDIC SURGERY

## 2025-05-29 PROCEDURE — 20610 DRAIN/INJ JOINT/BURSA W/O US: CPT | Mod: LT | Performed by: ORTHOPAEDIC SURGERY

## 2025-05-29 PROCEDURE — 3008F BODY MASS INDEX DOCD: CPT | Performed by: ORTHOPAEDIC SURGERY

## 2025-05-29 RX ORDER — LIDOCAINE HYDROCHLORIDE 10 MG/ML
1 INJECTION, SOLUTION INFILTRATION; PERINEURAL
Status: COMPLETED | OUTPATIENT
Start: 2025-05-29 | End: 2025-05-29

## 2025-05-29 RX ORDER — TRIAMCINOLONE ACETONIDE 40 MG/ML
10 INJECTION, SUSPENSION INTRA-ARTICULAR; INTRAMUSCULAR
Status: COMPLETED | OUTPATIENT
Start: 2025-05-29 | End: 2025-05-29

## 2025-05-29 RX ADMIN — TRIAMCINOLONE ACETONIDE 10 MG: 400 INJECTION, SUSPENSION INTRA-ARTICULAR; INTRAMUSCULAR at 08:54

## 2025-05-29 RX ADMIN — LIDOCAINE HYDROCHLORIDE 1 ML: 10 INJECTION, SOLUTION INFILTRATION; PERINEURAL at 08:54

## 2025-05-29 ASSESSMENT — LIFESTYLE VARIABLES
SKIP TO QUESTIONS 9-10: 1
HAS A RELATIVE, FRIEND, DOCTOR, OR ANOTHER HEALTH PROFESSIONAL EXPRESSED CONCERN ABOUT YOUR DRINKING OR SUGGESTED YOU CUT DOWN: NO
AUDIT TOTAL SCORE: 0
HOW OFTEN DO YOU HAVE A DRINK CONTAINING ALCOHOL: NEVER
AUDIT-C TOTAL SCORE: 0
HOW MANY STANDARD DRINKS CONTAINING ALCOHOL DO YOU HAVE ON A TYPICAL DAY: PATIENT DOES NOT DRINK
HAVE YOU OR SOMEONE ELSE BEEN INJURED AS A RESULT OF YOUR DRINKING: NO
HOW OFTEN DO YOU HAVE SIX OR MORE DRINKS ON ONE OCCASION: NEVER

## 2025-05-29 ASSESSMENT — PATIENT HEALTH QUESTIONNAIRE - PHQ9
2. FEELING DOWN, DEPRESSED OR HOPELESS: SEVERAL DAYS
SUM OF ALL RESPONSES TO PHQ9 QUESTIONS 1 AND 2: 2
1. LITTLE INTEREST OR PLEASURE IN DOING THINGS: SEVERAL DAYS

## 2025-05-29 ASSESSMENT — PAIN SCALES - GENERAL
PAINLEVEL_OUTOF10: 10 - WORST POSSIBLE PAIN
PAINLEVEL_OUTOF10: 10-WORST PAIN EVER

## 2025-05-29 ASSESSMENT — PAIN DESCRIPTION - DESCRIPTORS: DESCRIPTORS: ACHING;SHARP;SORE

## 2025-05-29 ASSESSMENT — ENCOUNTER SYMPTOMS
DEPRESSION: 0
OCCASIONAL FEELINGS OF UNSTEADINESS: 0
LOSS OF SENSATION IN FEET: 0

## 2025-05-29 ASSESSMENT — PAIN - FUNCTIONAL ASSESSMENT: PAIN_FUNCTIONAL_ASSESSMENT: 0-10

## 2025-05-29 NOTE — PATIENT INSTRUCTIONS
Thank you for coming to see us today!     Continue to use tylenol for pain control.   Rest, ice and elevate and remember to do exercises.   Keep your scheduled appointment to start into physical therapy.    You received a cortisone injection into your left shoulder. Please call our office at [phone] immediately if you develop increased pain, redness, warmth, or drainage from the injection site.     Follow up  at the end of July or the beginning of August, sooner if needed.

## 2025-05-30 DIAGNOSIS — R91.1 LUNG NODULE: ICD-10-CM

## 2025-06-12 ENCOUNTER — EVALUATION (OUTPATIENT)
Dept: PHYSICAL THERAPY | Facility: CLINIC | Age: 65
End: 2025-06-12
Payer: MEDICAID

## 2025-06-12 DIAGNOSIS — M25.512 CHRONIC LEFT SHOULDER PAIN: ICD-10-CM

## 2025-06-12 DIAGNOSIS — G89.29 CHRONIC LEFT SHOULDER PAIN: ICD-10-CM

## 2025-06-12 DIAGNOSIS — M75.02 ADHESIVE CAPSULITIS OF LEFT SHOULDER: ICD-10-CM

## 2025-06-12 PROCEDURE — 97110 THERAPEUTIC EXERCISES: CPT | Mod: GP | Performed by: PHYSICAL THERAPIST

## 2025-06-12 PROCEDURE — 97161 PT EVAL LOW COMPLEX 20 MIN: CPT | Mod: GP | Performed by: PHYSICAL THERAPIST

## 2025-06-12 SDOH — ECONOMIC STABILITY: GENERAL: QUALITY OF LIFE: FAIR

## 2025-06-12 ASSESSMENT — ENCOUNTER SYMPTOMS
QUALITY: SHARP
ALLEVIATING FACTORS: CHANGE IN POSITION
PAIN SCALE: 0
EXACERBATED BY: MOVEMENT
ALLEVIATING FACTORS: MEDICATIONS
PAIN SCALE AT LOWEST: 0
PAIN SCALE AT HIGHEST: 8
QUALITY: RADIATING
PAIN LOCATION: L SHOULDER

## 2025-06-12 NOTE — PROGRESS NOTES
Physical Therapy Evaluation and Treatment     Patient Name: Brandin Barfield  MRN: [id number]  Encounter date: 6/12/2025  Time Calculation  Start Time: 1315  Stop Time: 1355  Time Calculation (min): 40 min  PT Evaluation Time Entry  PT Evaluation (Low) Time Entry: 30  PT Therapeutic Procedures Time Entry  Therapeutic Exercise Time Entry: 10  Low complexity due to patient's clinical presentation being stable and uncomplicated by any significant comorbidities that may affect rehab tolerance and progression.     Visit # 1 of 10  Visits/Dates Authorized: 2025: [employer] MEDICAID - AUTH AFTER 30V - 0 used per Epic, check w/pt / 100% COVERAGE / AVAILITY [id number] / ds 6/11/25 //   Insurance Type: Payor: [employer] / Plan: [employer] / Product Type: *No Product type* /     Current Problem:   Problem List Items Addressed This Visit           ICD-10-CM    Left shoulder pain M25.512    Relevant Orders    Follow Up In Physical Therapy    Adhesive capsulitis of left shoulder M75.02    Relevant Orders    Follow Up In Physical Therapy     Precautions:  Precautions  Precautions Comment: anticoagulation therapy  Past Medical History Relevant to Rehab: ADHD, Anxiety, arrhythmia, arthritis, HTN, CHF, depression Eczema, Fx of lumbar spine, SVT, HTN, Lung nodule, obesity, Pneumonia, PE, L shoulder pain    Subjective    Subjective Evaluation    History of Present Illness  Date of onset: 5/2/2025  Mechanism of injury: Pt is a 63 yo M   with a history of chronic left shoulder pain with restricted range of motion. he is unable to lift his shoulder laterally greater than 30 degrees, anteriorly greater than 45 degrees.  He is pretty much just lived with it for multiple months because he thought it would either a go away and not really needing to use arm since he is not working.  The shoulder has become increasingly more painful with range of motion actively.  He has not had any treatment for  this.    Quality of life: fair    Pain  Current pain ratin  At best pain ratin  At worst pain ratin  Location: L shoulder  Quality: sharp and radiating  Relieving factors: change in position and medications (avoids reaching out to the side)  Aggravating factors: movement (reaching out to the side)    Social Support  Lives in: multiple-level home  Lives with: alone    Hand dominance: right    Diagnostic Tests  X-ray: abnormal (possible remote fracture deformity of the distal tip of the left clavicle. This appears unchanged since prior exam 2022 chest radiograph. Moderate AC osteoarthrosis. Mild glenohumeral osteoarthrosis. No fracture or dislocation. No osseous lesion.)    Treatments  Treatments tried: no Prior treatment.  Patient Goals  Patient goal: full use of arm         Objective      Objective     Postural Observations    Additional Postural Observation Details  Rounded shoulders forward head  Improved with towel roll for support    Palpation     Additional Palpation Details  Pt with LUT tightness > R  Tender L AC joint    Active Range of Motion   Left Shoulder   Flexion: 98 degrees   Extension: 60 degrees   Abduction: 60 degrees   External rotation BTH: Active external rotation behind the head: 3/4 range.   Internal rotation BTB: Active internal rotation behind the back: to belt.     Right Shoulder   Flexion: 143 degrees   Extension: 65 degrees   Abduction: 147 degrees   External rotation BTH: Active external rotation behind the head: full.   Internal rotation BTB: Active internal rotation behind the back: full.     Additional Active Range of Motion Details  Cervical flex full to chest  Ext 25 % no pain  SB R 30 degrees  SB L 35 degrees  R rotation 80 degrees  L rotation 70 degrees    Strength/Myotome Testing     Additional Strength Details  L shoulder  Flex  4+  Abd middle 4 and post 4-  IR 4  ER 4+    R shoulder   4+ throughout with the exception of abd post 4  Distally elbow flex/ext  5/5  IR 4+  ER 4+  Flex  4-  Abd 3+  IR 4   ER 4-  Equal         Tests     Additional Tests Details  Empty can L (+) L UE              Outcome Measures:    UEFI 54/80    Treatments:  Therapeutic Exercise  Therapeutic Exercise Performed: Yes  Therapeutic Exercise Activity 1: shoulder shrugs x10  Therapeutic Exercise Activity 2: scap retraction x10  Therapeutic Exercise Activity 3: retro rolls x10  Therapeutic Exercise Activity 4: pendulums A/P, M/L, CW/CCW x20 each    HEP / Access Codes:   Access Code: [email or web address]  URL: [email or web address]  Date: 06/12/2025  Prepared by: Farhana Enriquez    Exercises  - Flexion-Extension Shoulder Pendulum with Table Support  - 1 x daily - 7 x weekly - 3 sets - 10 reps  - Horizontal Shoulder Pendulum with Table Support  - 1 x daily - 7 x weekly - 3 sets - 20 reps  - Circular Shoulder Pendulum with Table Support  - 1 x daily - 7 x weekly - 3 sets - 20 reps  - Seated Scapular Retraction  - 1 x daily - 7 x weekly - 3 sets - 10 reps - 3 hold  - Seated Shoulder Shrugs  - 1 x daily - 7 x weekly - 3 sets - 10 reps - 3 hold  - Seated Shoulder Shrug Circles AROM Backward  - 1 x daily - 7 x weekly - 3 sets - 10 reps  Assessment   Assessment & Plan     Assessment  Impairments: abnormal muscle tone, impaired physical strength, lacks appropriate home exercise program and pain with function  Assessment details: Pt is a 63 yo M with limited AROM of L shoulder with pain.  Pt with decreased strength throughout UE and periscapular muscles.  Pt with benefit from skilled PT in order to address pain, ROM, scapular mobility and stabilization, strength L UE, and function use of arm for daily activity  Prognosis: good    Goals  full use of arm    Plan  Therapy options: will be seen for skilled physical therapy services  Planned modality interventions: cryotherapy, thermotherapy (hydrocollator packs) and ultrasound  Other planned modality interventions: IFCVELIA taping  Planned therapy interventions: body  mechanics training, flexibility, functional ROM exercises, home exercise program, joint mobilization, manual therapy, neuromuscular re-education, postural training, soft tissue mobilization, strengthening and stretching  Frequency: 2x/week for 2 weeks and 1x/week for 6 weeks for 10 visits.  Duration in visits: 10  Duration in weeks: 8  Treatment plan discussed with: patient           Goals:   Active       PT Problem       PT Goals       Start:  06/12/25    Expected End:  09/10/25       STGs  1.  Pt to be independent with HEP in order to manage current condition  2.  Pt will report decreased pain L shoulder from 8/10 to </= 3/10 with reaching out to the side  3.  Pt will improve L shoulder AROM to equal R without pain in order to perform daily and work activities   LTGs  1.  Pt will increase strength L UE to 4+/5 MMT in order to complete daily and work related tasks  3.  Pt will improve UEFI score from 54/80 to 64/80 in order to demonstrate overall improved shoulder function           Patient Stated Goal 1       Start:  06/12/25    Expected End:  09/10/25       To have full use of his L arm

## 2025-06-23 NOTE — H&P
History Of Present Illness  Brandin Barfield is a 64 y.o. male presenting with lung nodule. Pt found to have 1.5 cm right upper lobe lung nodule. He underwent PET scan that showed this nodule had an suv max of 1.7. history of heavy smoking, he follows with thoracic surgeon Dr. Codey Obrien. Here for lung biopsy. PMH includes pulmonary nodule, pulmonary emboli (3/4/2025), HF, SVT s/p ablation, hyperparathyroid.    Past Medical History:  Medical History[1]     Past Surgical History:  Surgical History[2]       Social History:  Social History[3]    Family History:  Family History[4]     Allergies:  RX Allergies[5]     Home Medications:  Current Outpatient Medications   Medication Instructions    albuterol (Proventil HFA) 90 mcg/actuation inhaler 2 puffs, inhalation, Every 4 hours PRN    alendronate (FOSAMAX) 10 mg, oral, Daily    apixaban (ELIQUIS) 5 mg, oral, 2 times daily    aspirin 81 mg, oral, Daily    buPROPion XL (WELLBUTRIN XL) 300 mg, oral, Daily, Do not crush, chew, or split. Total dose 450mg    cefuroxime (CEFTIN) 500 mg, oral, 2 times daily    cholecalciferol (Vitamin D-3) 5,000 Units tablet 1 tablet, Daily    DULoxetine (CYMBALTA) 30 mg, 2 times daily    empagliflozin (JARDIANCE) 10 mg, oral, Daily RT    gabapentin (NEURONTIN) 100 mg, oral, 3 times daily    ketoconazole (NIZOral) 2 % shampoo Topical, 2 times weekly, Shampoo daily, leave on for 5-10 minutes, then rinse.    metFORMIN XR (GLUCOPHAGE-XR) 500 mg, oral, Daily with evening meal, Do not crush, chew, or split.    metoprolol succinate XL (TOPROL-XL) 50 mg, oral, Every evening, Do not crush or chew.    [Paused] paliperidone (Invega) 1.5 mg 24 hr tablet 1 tablet, Nightly    primidone (MYSOLINE) 250 mg, oral, Nightly    sacubitriL-valsartan (Entresto) 24-26 mg tablet 1 tablet, oral, 2 times daily       Inpatient Medications:  Scheduled Medications[6]  PRN Medications[7]  Continuous Medications[8]      Review of Systems   Constitutional: Negative.   Negative for fatigue.   HENT: Negative.     Eyes: Negative.    Respiratory:  Positive for shortness of breath (BALDWIN). Negative for cough.    Cardiovascular: Negative.    Gastrointestinal: Negative.    Endocrine: Negative.    Genitourinary: Negative.    Musculoskeletal: Negative.    Skin: Negative.    Allergic/Immunologic: Negative.    Neurological: Negative.    Hematological: Negative.    Psychiatric/Behavioral: Negative.            Physical Exam  Constitutional:       General: He is awake. He is not in acute distress.     Appearance: Normal appearance. He is not ill-appearing.   HENT:      Head: Normocephalic and atraumatic.      Mouth/Throat:      Mouth: Mucous membranes are moist.      Pharynx: Oropharynx is clear.   Cardiovascular:      Rate and Rhythm: Normal rate and regular rhythm.      Pulses:           Radial pulses are 2+ on the right side and 2+ on the left side.        Dorsalis pedis pulses are 2+ on the right side and 2+ on the left side.      Heart sounds: Normal heart sounds. No murmur heard.  Pulmonary:      Effort: Pulmonary effort is normal.      Breath sounds: Normal breath sounds.   Abdominal:      General: Bowel sounds are normal.      Palpations: Abdomen is soft.   Musculoskeletal:         General: Normal range of motion.      Right lower leg: No edema.      Left lower leg: No edema.   Skin:     General: Skin is warm and dry.      Capillary Refill: Capillary refill takes less than 2 seconds.   Neurological:      General: No focal deficit present.      Mental Status: He is alert and oriented to person, place, and time. Mental status is at baseline.      Cranial Nerves: Cranial nerves 2-12 are intact.   Psychiatric:         Mood and Affect: Mood and affect normal.         Behavior: Behavior normal.        Sedation Plan    ASA 3     Mallampati class: II.           NPO since 1900 on 6/30/2025    Last Recorded Vitals  Blood pressure 127/86, pulse 68, temperature 36.4 °C (97.5 °F), temperature source  "Temporal, resp. rate 18, height 1.905 m (6' 3\"), weight 139 kg (307 lb), SpO2 94%.         Vitals from the Past 24 Hours  Heart Rate:  [68]   Temp:  [36.4 °C (97.5 °F)]   Resp:  [18]   BP: (127)/(86)   Height:  [190.5 cm (6' 3\")]   Weight:  [139 kg (307 lb)]   SpO2:  [94 %]          Relevant Results    Labs    POCT Glucose:      POCT Urine Pregnancy:       CBC:   Recent Labs     05/15/25  1443 04/26/25  0522 04/25/25  0412 04/24/25  0431 04/22/25  2214 03/06/25  0447   WBC 6.1 7.3 7.4 8.2 10.6 5.4   HGB 15.6 13.5 13.2* 13.4* 15.8 14.4   HCT 48.0 43.6 42.2 42.3 47.1 44.5    236 195 199 226 246   MCV 86.5 89 88 88 87 87     BMP/CMP:   Recent Labs     05/20/25  1604 05/15/25  1443 04/26/25  0522 04/25/25  0412 04/24/25  0431 04/22/25  2214 03/06/25  0447 03/05/25  0500 03/04/25  1217 01/14/25  1535   NA  --  137 132* 134* 134* 137 135*   < > 138 141   K  --  4.7 5.1 4.4 4.4 4.3 4.2   < > 4.4 4.8   CL  --  106 103 104 103 105 104   < > 108* 106   BUN  --  25 16 18 18 23 24*   < > 33* 26*   CREATININE  --  1.22 1.23 1.25 1.33* 1.38* 1.24   < > 1.38* 1.40*   CO2  --  22 21 24 24 26 25   < > 24 28   CALCIUM  --  10.8* 10.4* 10.4* 10.0 10.8* 10.4*   < > 10.8* 11.2*   PROT 7.2  --   --   --   --  7.5  --   --  7.1 7.0   BILITOT  --   --   --   --   --  0.4  --   --  0.3 0.2   ALKPHOS  --   --   --   --   --  71  --   --  77 88   ALT  --   --   --   --   --  17  --   --  21 22   AST  --   --   --   --   --  14  --   --  19 18   GLUCOSE  --  99 107* 121* 110* 90 92   < > 112* 120*    < > = values in this interval not displayed.      Magnesium: No results for input(s): \"MG\" in the last 8760 hours.  Lipid Panel:   No results for input(s): \"CHOL\", \"HDL\", \"CHHDL\", \"LDL\", \"VLDL\", \"TRIG\", \"NHDL\" in the last 8760 hours.    Cardiac       No lab exists for component: \"CK\", \"CKMBP\"   Hemoglobin A1C:   Recent Labs     03/04/25  1636 01/14/25  1535   HGBA1C 5.4 5.4     TSH/ Free T4:   Recent Labs     01/14/25  1535   TSH 1.11 " "    Iron:   Recent Labs     04/22/25  2214 03/04/25  1217   BNP 25 9     Coag:     ABO: No results found for: \"ABO\"    STUDY:  CT ANGIO CHEST FOR PULMONARY EMBOLISM;  4/22/2025 11:14 pm      INDICATION:  Signs/Symptoms:Dyspnea, tachycardia, history of PE.          COMPARISON:  CT PE dated 03/04/2025;      ACCESSION NUMBER(S):  [id number]      ORDERING CLINICIAN:  DEYANIRA ROSENTHAL      TECHNIQUE:  Helical data acquisition of the chest was obtained after intravenous  administration of 75 ML Omnipaque 350, as per PE protocol. Images  were reformatted in coronal and sagittal planes. Axial and coronal  maximum intensity projection (MIP) images were created and reviewed.      FINDINGS:  POTENTIAL LIMITATIONS OF THE STUDY: Images are somewhat limited by  suboptimal contrast bolus timing, with preferential enhancement of  the systemic arterial system instead of the pulmonary arterial system.      HEART AND VESSELS:  Limited evaluation due to suboptimal contrast bolus timing with  preferential enhancement of the pulmonary venous instead of the  pulmonary arterial system. Within limits of the study, there are  subtle filling defects partially visualized in the lobar artery  supplying the right middle and right lower lobe, likely representing  resolving emboli seen on previous exam on 03/04/2025. No new large  central embolism is identified. Main pulmonary artery and its  branches are normal in caliber.      The thoracic aorta normal in course and caliber.Minimal vascular  calcifications are present in the thoracic aorta.Although, the study  is not tailored for evaluation of aorta, there is no definite  evidence of acute aortic pathology. Moderate coronary artery  calcifications are seen. Please note,the study is not optimized for  evaluation of coronary arteries.      The cardiac chambers are not enlarged.There are no findings to  suggest right heart strain. There is no pericardial effusion seen.      MEDIASTINUM AND OSORIO, LOWER " NECK AND AXILLA:  The visualized thyroid gland is within normal limits.  Several mildly enlarged mediastinal lymph nodes are present,  measuring up to 1.4 cm in size in the right paratracheal space, up to  1.7 cm in size in the subcarinal space, and up to 1.7 cm in size in  the right hilum. These are similar to prior study on 03/04/2025.  Esophagus appears within normal limits as seen.      LUNGS AND AIRWAYS:  The trachea and central airways are patent. No endobronchial lesion  is seen.      There is redemonstration of the a 1.5 cm somewhat irregular shaped  nodule in the right upper lobe (series 9, image 96), similar in size  to prior exam, with new patchy nodular airspace opacity is present  slightly more inferior in the right upper lobe (series 9, image 130)  compared to prior exam on 03/04/2025.      Mild atelectatic changes are present in the lower lobes bilaterally  without evidence of consolidation or pleural effusion. No  pneumothorax is present.      UPPER ABDOMEN:  The visualized subdiaphragmatic structures demonstrate no remarkable  findings.      CHEST WALL AND OSSEOUS STRUCTURES:  Chest wall is within normal limits.  No acute osseous pathology.There are no suspicious osseous  lesions.Chronic compression deformity along superior endplate of L1  is similar to prior exam. Chronic compression deformity along  superior endplate of T4 is also similar to prior study.      No acute abnormalities identified in the thoracic spine.      IMPRESSION:  1. Limited examination due to suboptimal contrast bolus timing, with  preferential enhancement of the pulmonary venous system instead of  the pulmonary arterial system. Within limits of the exam, there is  suggestion of the faint linear filling defects present within the  lobar arteries supplying the right middle and right lower lobe,  likely representing resolving pulmonary emboli previously visualized  on 03/04/2025. No new large central saddle embolism is present in  the  interim since prior exam.  2. Subtle new patchy airspace opacities are present in the inferior  aspect of the right upper lobe in the interim since prior study on  03/04/2025, suggestive of developing infiltrate/pneumonia.  3. A 1.5 cm somewhat irregular shaped solid nodule in the right upper  lobe is similar in appearance to prior exam.  4. Several nonspecific enlarged mediastinal lymph nodes are similar  to prior study, including subcarinal and right paratracheal lymph  node, which measures up to 1.7 cm in short axis dimension.      MACRO:  None      Signed by: Ema Figueroa 4/23/2025 1:13 AM  Dictation workstation:   [id number]        Past Cardiology Tests (Last 3 Years):    EKG:  Recent Labs     04/22/25  2100 03/04/25  1155   ATRRATE 126 100   VENTRATE 126 100   PRINT 150 158   QRSDUR 94 94   QTCFRED 369 374   QTCCALCB 417 407     Encounter Date: 03/04/25   ECG 12 Lead   Result Value    Ventricular Rate 126    Atrial Rate 126    CO Interval 150    QRS Duration 94    QT Interval 288    QTC Calculation(Bazett) 417    P Axis 34    R Axis -15    T Axis 27    QRS Count 21    Q Onset 223    P Onset 148    P Offset 196    T Offset 367    QTC Fredericia 369    Narrative    Sinus tachycardia  Left ventricular hypertrophy with repolarization abnormality ( R in aVL )  Cannot rule out Septal infarct , age undetermined  Abnormal ECG  When compared with ECG of 04-MAR-2025 11:29,  T wave inversion no longer evident in Inferior leads  T wave inversion no longer evident in Lateral leads  Confirmed by Benjamin Ureña ([id number]) on 4/24/2025 8:04:30 AM     Echo:  Echocardiogram:   Transthoracic Echo (TTE) Complete With Contrast 03/05/2025    [place] [address]  Phone [phone]    TRANSTHORACIC ECHOCARDIOGRAM REPORT    Patient Name:       LATHA BARRON      Reading Physician:    [id number] Benjamin Ureña DO  Study Date:         3/5/2025             Ordering Provider:     [id number] ANGELIQUE RODRIGUEZ  MRN/PID:            [id number]             Fellow:  Accession#:         [id number]         Nurse:  Date of Birth/Age:  1960 / 64      Sonographer:          Cornelia kee RDCS  Gender Assigned at  M                    Additional Staff:  Birth:  Height:             190.50 cm            Admit Date:  Weight:             148.78 kg            Admission Status:     Inpatient -  Routine  BSA / BMI:          2.71 m2 / 41.00      Department Location:  [facility] Step  kg/m2                                      Down  Blood Pressure: 103 /66 mmHg    Study Type:    TRANSTHORACIC ECHO (TTE) COMPLETE  Diagnosis/ICD: Other pulmonary embolism without acute cor pulmonale-I26.99  Indication:    pulmonary embolism  CPT Codes:     Echo Complete w Full Doppler-15423    Patient History:  Smoker:            Former.  BMI:               Obese >30  Pertinent History: HTN, Chest Pain, CHF, PE and Cardiomyopathy. bmi 41, chr  pain, tremor, sob, arf.    Study Detail: The following Echo studies were performed: 2D, M-Mode, Doppler and  color flow. Technically challenging study due to poor acoustic  windows, body habitus, patient lying in supine position and  prominent lung artifact. Definity used as a contrast agent for  endocardial border definition. Total contrast used for this  procedure was 3 mL via IV push.      PHYSICIAN INTERPRETATION:  Left Ventricle: The left ventricular systolic function is low normal, with a visually estimated ejection fraction of 50-55%. There are no regional wall motion abnormalities. The left ventricular cavity size is normal. Left ventricular diastolic filling was not assessed.  Left Atrium: The left atrial size is normal.  Right Ventricle: The right ventricle is normal in size. Unable to determine right ventricular systolic function.  Right Atrium: The right atrial size was not well visualized.  Aortic Valve: The aortic valve is probably  trileaflet. The aortic valve dimensionless index is 0.60. There is no evidence of aortic valve regurgitation. The peak instantaneous gradient of the aortic valve is 5 mmHg. The mean gradient of the aortic valve is 2 mmHg.  Mitral Valve: The mitral valve is normal in structure. There is no evidence of mitral valve regurgitation.  Tricuspid Valve: The tricuspid valve was not well visualized. No evidence of tricuspid regurgitation.  Pulmonic Valve: The pulmonic valve is not well visualized. The pulmonic valve regurgitation was not well visualized.  Pericardium: No pericardial effusion noted.  Aorta: The aortic root was not well visualized.      CONCLUSIONS:  1. The left ventricular systolic function is low normal, with a visually estimated ejection fraction of 50-55%.  2. Unable to determine right ventricular systolic function.  3. Technically very difficult study.  4. Right ventricle appeared to be of normal size, unable to assess function.    QUANTITATIVE DATA SUMMARY:    LV SYSTOLIC FUNCTION:  Normal Ranges:  EF-A4C View:    65 % (>=55%)  EF-Visual:      53 %  LV EF Reported: 53 %      LV DIASTOLIC FUNCTION:           Normal Ranges:  MV Peak E:             0.50 m/s  (0.7-1.2 m/s)  MV Peak A:             0.88 m/s  (0.42-0.7 m/s)  E/A Ratio:             0.57      (1.0-2.2)  MV e'                  0.087 m/s (>8.0)  MV lateral e'          0.11 m/s  MV medial e'           0.07 m/s  E/e' Ratio:            5.70      (<8.0)  a'                     0.01 m/s      MITRAL VALVE:          Normal Ranges:  MV DT:        323 msec (150-240msec)      AORTIC VALVE:                     Normal Ranges:  AoV Vmax:                1.09 m/s (<=1.7m/s)  AoV Peak P.8 mmHg (<20mmHg)  AoV Mean P.0 mmHg (1.7-11.5mmHg)  LVOT Max Colin:            0.64 m/s (<=1.1m/s)  AoV VTI:                 16.20 cm (18-25cm)  LVOT VTI:                9.71 cm  LVOT Diameter:           2.00 cm  (1.8-2.4cm)  AoV Area, VTI:            1.88 cm2 (2.5-5.5cm2)  AoV Area,Vmax:           1.84 cm2 (2.5-4.5cm2)  AoV Dimensionless Index: 0.60      RIGHT VENTRICLE:  TAPSE: 17.1 mm  RV s'  0.10 m/s      TRICUSPID VALVE/RVSP:          Normal Ranges:  Peak TR Velocity:     2.81 m/s  RV Syst Pressure:     35 mmHg  (< 30mmHg)      PULMONIC VALVE:          Normal Ranges:  PV Accel Time:  98 msec  (>120ms)  PV Max Colin:     0.7 m/s  (0.6-0.9m/s)  PV Max P.0 mmHg      [id number] Benjamin Ureña DO  Electronically signed on 3/5/2025 at 1:24:33 PM        ** Final **    Ejection Fractions:  LV EF   Date/Time Value Ref Range Status   2025 11:32 AM 53 %    2023 08:48 AM 44       Cath:  Coronary Angiography:   Adult Cath     Narrative  [facility], Cath Lab, [address] [place] [address]    Cardiovascular Catheterization Report    Patient Name:     IAVN BARRON Performing Physician:  [id number] Doss MD  Study Date:       2022       Verifying Physician:   Gena Doss MD  MRN/PID:          [id number]        Cardiologist/Co-scrub:  Accession/Order#: [id number]       Fellow:  YOB: 1960      Fellow:  Gender:           M               Referring Physician:   BIBIANA DOSS  Admit Date:                       Referring Physician:   X  Surgeon:                          Referring Physician:   [id number] Le Gallardo MD      Study: Left and Right Heart Catheterization      Indications:  IVAN BARRON is a 62 year old male who presents with dyslipidemia. New onset angina <=2 months and cardiomyopathy, with a chest pain assessment of atypical angina. Study performed as an elective cath procedure. Heart failure.    Medical History:  Stress test performed: No. CTA performed: No. Elsy accessed: No. LVEF Assessed: Yes. LVEF = 27%.    Procedure Description:  After infiltration with 2% Lidocaine, the right femoral artery was cannulated with a modified Seldinger technique. Subsequently a 5 Yoruba sheath was placed in the  right femoral artery. After infiltration of local anesthetic, the right brachial vein vein was cannulated with a micropuncture technique. A 5 Greenlandic sheath was placed in the vein. Selective coronary catheterization was performed using a 5 Fr catheter(s) exchanged over a guide wire to cannulate the coronary arteries. A JL 3.5 tip catheter was used for left coronary injections. A JR 4 tip catheter was used for right coronary injections.  Additional catheter(s) used to visualize the coronary arteries were: AL1 and AR Mod. Multiple injections of contrast were made into the left and right coronary arteries with angiograms recorded in multiple projections. Cardiac output was calculated via the Miguelangel method. After completion of the procedure, femoral artery angiography was performed. This demonstrated a common femoral artery puncture appropriate for closure. An Angio-Seal Evolution 6F ([facility]) vascular closure device was placed per protocol the arterial sheath was pulled and a TR Band Radial Compression Device was utilized to obtain patent hemostasis. Post-procedure, the venous sheath was pulled and pressure was applied to the site.    Procedure Description Comments:  Pt had severe right radial artery spasm, therefore we switched to femoral access.    Coronary Angiography:  The coronary circulation is left dominant.    Left Main Coronary Artery:  The left main coronary artery is a normal caliber vessel. The left main arises normally from the left coronary sinus of Valsalva and bifurcates into the LAD and circumflex coronary arteries. The left main coronary artery showed no significant disease or stenosis greater than 30%.    Left Anterior Descending Coronary Artery Distribution:  The left anterior descending coronary artery is a normal caliber vessel. The LAD arises normally from the left main coronary artery. The LAD demonstrated no significant disease or stenosis greater than 30%. The 1st diagonal branch showed  no significant disease or stenosis greater than 30%. The 2nd diagonal branch demonstrated no significant disease or stenosis greater than 30%. The 3rd diagonal branch revealed no significant disease or stenosis greater than 30%.    Circumflex Coronary Artery Distribution:  The circumflex coronary artery is a normal caliber vessel. The circumflex arises normally from the left main coronary artery and terminates in the AV groove. The circumflex revealed no significant disease or stenosis greater than 30%. The 1st obtuse marginal branch showed no significant disease or stenosis greater than 30%. The 2nd obtuse marginal branch demonstrated no significant disease or stenosis greater than 30%. The 3rd obtuse marginal branch revealed no significant disease or stenosis greater than 30%. The left posterolateral branch showed no significant disease or stenosis greater than 30%. The left posterior descending artery showed no significant disease or stenosis greater than 30%.    Right Heart Catheterization:  Cardiac output was calculated via the Miguelangel method. Elevated left sided filling pressures with low cardiac output. Elevated right- [RA 7, RVEDP 9] and left- [PCWP 15] sided filling pressures with normal assumed Miguelangel CO at 5.9 L/min and borderline low CI at 2.3 L/min/m2. PA pressure is 27/14 with a mean of 19 mmHg.    Right Coronary Artery Distribution:    The right coronary artery is a normal caliber vessel. The RCA arises normally from the right sinus of Valsalva. The RCA showed no significant disease or stenosis greater than 30%.    Coronary Interventions:    Hemo Personnel:  +--------------------+-------------+  Name                Duty           +--------------------+-------------+  Ger Doss MD        PROC MD 1  +--------------------+-------------+  Daya Terry RTALICIA PROC SCRUB 1  +--------------------+-------------+  Thelma West RN      PROC CIRC 1  +--------------------+-------------+  Manny,  Celia RN    PROC RECORD 1  +--------------------+-------------+  Alexandria Salas     PROC RECORD 2  +--------------------+-------------+  Thelma West RN     PROC NURSE 1  +--------------------+-------------+      Sedation Time:  +------------------------+----------------------------------------+  Sedation Start/End TimesTime                                      +------------------------+----------------------------------------+  Start                   12/1/2022 14:37:15                        +------------------------+----------------------------------------+  Drugs                   Fentanyl 25 mcg IV per physician for sed  +------------------------+----------------------------------------+  End                     12/1/2022 15:46:13                        +------------------------+----------------------------------------+        Oxygen Saturation %:  +-----------+----------+------------+  Sample SiteO2 Sat (%)HB (g/100ml)  +-----------+----------+------------+           FA        99        14.9  +-----------+----------+------------+           PA        70        14.9  +-----------+----------+------------+           FA        99        14.9  +-----------+----------+------------+           PA        70        14.9  +-----------+----------+------------+      Cardiac Outputs:  +---------------+------------------+-------+  KRYSTLE CO (l/min)KRYSTLE CI (l/min/m2)KRYSTLE SV  +---------------+------------------+-------+              5.9               2.3   72.9  +---------------+------------------+-------+      Vascular Resistance Calculated Values (Wood Units):  +-----+---+----+---+----+  PhasePVRPVRITPRTPRI  +-----+---+----+---+----+  0    0.71.8 3.28.4   +-----+---+----+---+----+      Complications:  No in-lab complications observed.    Cardiac Cath Transition of Care Summary:  Post Procedure Diagnosis: See below.  Blood Loss:               Estimated blood loss  during the procedure was 5 mls.  Specimens Removed:        Number of specimen(s) removed: none.      Recommendations:  Further management as per Dr. Brayan Gallardo.    ____________________________________________________________________________________  CONCLUSIONS:  1. 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  2. Mildly elevated right- [RA 7, RVEDP 9] and left- [PCWP 15] sided filling pressures with normal assumed Miguelangel CO at 5.9 L/min and borderline low CI at 2.3 L/min/m2. PA pressure is 27/14 with a mean of 19 mmHg.    ____________________________________________________________________________________  CPT Codes:  Left Heart Cath (visualization of coronaries) and LV-21027; Right Heart Cath O2/Cardiac output without biopsy (RHC)-39474; Moderate Sedation Services initial 15 minutes patient >5 years-[id number]; Moderate Sedation Services 1st additional 15 minutes patient >5 years-02571; Moderate Sedation Services 2nd additional 15 minutes patient >5 years-[id number]; Moderate Sedation Services 3rd additional 15 minutes patient >5 years-03227    ICD 10 Codes:  I51.7-Cardiomegaly; R07.89-Other chest pain    [id number] Ger Doss MD  Performing Physician  Electronically signed by [id number] Ger Doss MD on 12/1/2022 at 4:25:26 PM      cc Report to: GER DOSS    cc Report to: X    cc Report to: [id number] Le Gallardo MD              Right Heart Cath: No results found for this or any previous visit from the past 1800 days.    Stress Test:  Nuclear:No results found for this or any previous visit from the past 1800 days.    Metabolic Stress: No results found for this or any previous visit from the past 1800 days.    Cardiac Imaging:  Cardiac Scoring: No results found for this or any previous visit from the past 1800 days.    Cardiac MRI: No results found for this or any previous visit from the past 1800 days.         Assessment/Plan  Assessment/Plan   Assessment & Plan  Lung nodule        Lung nodule  Pulmonary emboli (3/4/2025) on  Eliquis    Here for lung biopsy with Dr. Cortez on 2025             I spent 35 minutes in the professional and overall care of this patient.      Mer Lama, APRN-CNP         [1]   Past Medical History:  Diagnosis Date    ADHD (attention deficit hyperactivity disorder)     Allergic     Anxiety     Arrhythmia     Arthritis     CHF (congestive heart failure) 10/22    Depression     Eczema During     Fracture of lumbar spine (Multi)     H/O supraventricular tachycardia     Headache     Hyperlipidemia     Hypertension     Lung nodule     Obesity     Pneumonia     Pulmonary embolism     Varicella 1964    Visual impairment    [2]   Past Surgical History:  Procedure Laterality Date    CARDIAC CATHETERIZATION      CARDIAC ELECTROPHYSIOLOGY STUDY AND ABLATION      CIRCUMCISION, PRIMARY      MR HEAD ANGIO WO IV CONTRAST  2017    MR HEAD ANGIO WO IV CONTRAST 2017 [facility]    MR NECK ANGIO WO IV CONTRAST  2017    MR NECK ANGIO WO IV CONTRAST 2017 [facility]    OTHER SURGICAL HISTORY  2022    No history of surgery    WISDOM TOOTH EXTRACTION     [3]   Social History  Tobacco Use    Smoking status: Former     Current packs/day: 0.00     Average packs/day: 3.0 packs/day for 39.4 years (118.1 ttl pk-yrs)     Types: Cigarettes     Start date: 1969     Quit date:      Years since quittin.5     Passive exposure: Past    Smokeless tobacco: Never   Vaping Use    Vaping status: Never Used   Substance Use Topics    Alcohol use: Not Currently     Comment: Quit in     Drug use: Never   [4]   Family History  Problem Relation Name Age of Onset    Coronary artery disease Father Fernando Efren Urbanoct     Heart disease Father Fernando Efren Barfield     Coronary artery disease Brother Fidel Barfield     Drug abuse Brother Fidel Barfield     Arthritis Brother Fidel Barfield     Depression Mother Betsy Lawton  Lico     Hypertension Mother Betsy Barfield     Mental illness Mother Betsy Barfield    [5]   Allergies  Allergen Reactions    Vibramycin [Doxycycline Calcium] Swelling    Doxycycline Unknown   [6] [7] [8]

## 2025-06-26 ENCOUNTER — TREATMENT (OUTPATIENT)
Dept: PHYSICAL THERAPY | Facility: CLINIC | Age: 65
End: 2025-06-26
Payer: MEDICAID

## 2025-06-26 DIAGNOSIS — M25.512 CHRONIC LEFT SHOULDER PAIN: ICD-10-CM

## 2025-06-26 DIAGNOSIS — G89.29 CHRONIC LEFT SHOULDER PAIN: ICD-10-CM

## 2025-06-26 DIAGNOSIS — M75.02 ADHESIVE CAPSULITIS OF LEFT SHOULDER: ICD-10-CM

## 2025-06-26 PROCEDURE — 97110 THERAPEUTIC EXERCISES: CPT | Mod: GP,CQ

## 2025-06-26 ASSESSMENT — PAIN DESCRIPTION - DESCRIPTORS: DESCRIPTORS: ACHING

## 2025-06-26 ASSESSMENT — PAIN SCALES - GENERAL: PAINLEVEL_OUTOF10: 2

## 2025-06-26 NOTE — PROGRESS NOTES
"Physical Therapy Treatment    Patient Name: Brandin Barfield  MRN: [id number]  Today's Date: 6/26/2025  Time Calculation  Start Time: 1005  Stop Time: 1051  Time Calculation (min): 46 min  PT Therapeutic Procedures Time Entry  Therapeutic Exercise Time Entry: 42    Insurance:  Visit number: 2 of 10  Authorization info: 2025: [employer] MEDICAID - AUTH AFTER 30V - 0 used per Epic, check w/pt / 100% COVERAGE / AVAILITY [id number] / ds 6/11/25 //     Insurance Type: Payor: [employer] MEDICAID / Plan: [employer] MEDICAID / Product Type: *No Product type* /     Current Problem   1. Chronic left shoulder pain  Follow Up In Physical Therapy      2. Adhesive capsulitis of left shoulder  Follow Up In Physical Therapy          Subjective   General   Reason for Referral: Left shoulder pain M25.512     Relevant Orders    Follow Up In Physical Therapy    Adhesive capsulitis of left shoulder M75.02    Relevant Orders    Follow Up In Physical Therapy  Referred By: Benjamin Dowell MD  Past Medical History Relevant to Rehab: ADHD, Anxiety, arrhythmia, arthritis, HTN, CHF, depression Eczema, Fx of lumbar spine, SVT, HTN, Lung nodule, obesity, Pneumonia, PE, L shoulder pain  General Comment: Pt reports minimal pain when not using L UE.  Precautions:  Precautions  Precautions Comment: anticoagulation therapy  Pain   0-10 (Numeric) Pain Score: 2 (Increases with movement.)  Pain Type: Chronic pain  Pain Location: Shoulder  Pain Orientation: Left  Pain Descriptors: Aching  Post Treatment Pain Level 2/10    Objective   Pt requires moderate postural cueing during ther ex.    Treatments:  Therapeutic Exercise:  Therapeutic Exercise  Therapeutic Exercise Performed: Yes  Therapeutic Exercise Activity 1: UBE Retro x 5'  Therapeutic Exercise Activity 2: Pulleys flexion 5\" hold 2x10  Therapeutic Exercise Activity 3: Pulleys scaption 5\" hold 2x10  Therapeutic Exercise Activity 4: Slide board flexion L3 2x10  Therapeutic " "Exercise Activity 5: Slide board scaption L3 2x10  Therapeutic Exercise Activity 6: Scap retractions 5\" hold 2x10  Therapeutic Exercise Activity 7: Rows L3 band 2x10  Therapeutic Exercise Activity 8: Shoulder extension L3 band 2x10  Therapeutic Exercise Activity 9: Upper trap stretches 20\"x3 each  Therapeutic Exercise Activity 10: Chin tucks 5\" hold 2x10       Assessment   Assessment:   PT Assessment  Rehab Prognosis: Good  Evaluation/Treatment Tolerance: Patient tolerated treatment well  Assessment Comment: Pt demonstrates poor to fair postural awareness during ther ex, improves with treatment.  Tolerates treatment without increased pain S/S.    Plan:   OP PT Plan  Treatment/Interventions: Other (comment) (Continues to preogress postural and L UE strengthening and flexibility as able.)  PT Plan: Skilled PT  Rehab Potential: Good    OP EDUCATION:  Outpatient Education  Individual(s) Educated: Patient  Education Provided: Posture, Home Exercise Program, Body Mechanics, Anatomy  Patient/Caregiver Demonstrated Understanding: yes  Patient Response to Education: Patient/Caregiver Verbalized Understanding of Information, Patient/Caregiver Performed Return Demonstration of Exercises/Activities, Patient/Caregiver Asked Appropriate Questions    Goals:   Active       PT Problem       PT Goals       Start:  06/12/25    Expected End:  09/10/25       STGs  1.  Pt to be independent with HEP in order to manage current condition  2.  Pt will report decreased pain L shoulder from 8/10 to </= 3/10 with reaching out to the side  3.  Pt will improve L shoulder AROM to equal R without pain in order to perform daily and work activities   LTGs  1.  Pt will increase strength L UE to 4+/5 MMT in order to complete daily and work related tasks  3.  Pt will improve UEFI score from 54/80 to 64/80 in order to demonstrate overall improved shoulder function           Patient Stated Goal 1       Start:  06/12/25    Expected End:  09/10/25       To " have full use of his L arm

## 2025-07-01 ENCOUNTER — HOSPITAL ENCOUNTER (OUTPATIENT)
Dept: RADIOLOGY | Facility: HOSPITAL | Age: 65
Discharge: HOME | End: 2025-07-01
Payer: MEDICAID

## 2025-07-01 ENCOUNTER — DOCUMENTATION (OUTPATIENT)
Dept: PHYSICAL THERAPY | Facility: CLINIC | Age: 65
End: 2025-07-01

## 2025-07-01 DIAGNOSIS — R91.1 LUNG NODULE: ICD-10-CM

## 2025-07-01 LAB
ERYTHROCYTE [DISTWIDTH] IN BLOOD BY AUTOMATED COUNT: 14 % (ref 11.5–14.5)
GLUCOSE BLD MANUAL STRIP-MCNC: 104 MG/DL (ref 74–99)
HCT VFR BLD AUTO: 44.9 % (ref 41–52)
HGB BLD-MCNC: 14.3 G/DL (ref 13.5–17.5)
INR PPP: 1 (ref 0.9–1.2)
MCH RBC QN AUTO: 27.9 PG (ref 26–34)
MCHC RBC AUTO-ENTMCNC: 31.8 G/DL (ref 32–36)
MCV RBC AUTO: 88 FL (ref 80–100)
NRBC BLD-RTO: 0 /100 WBCS (ref 0–0)
PLATELET # BLD AUTO: 201 X10*3/UL (ref 150–450)
PROTHROMBIN TIME: 10.7 SECONDS (ref 9.3–12.7)
RBC # BLD AUTO: 5.12 X10*6/UL (ref 4.5–5.9)
WBC # BLD AUTO: 6.2 X10*3/UL (ref 4.4–11.3)

## 2025-07-01 PROCEDURE — 7100000009 HC PHASE TWO TIME - INITIAL BASE CHARGE

## 2025-07-01 PROCEDURE — 99152 MOD SED SAME PHYS/QHP 5/>YRS: CPT

## 2025-07-01 PROCEDURE — C1819 TISSUE LOCALIZATION-EXCISION: HCPCS

## 2025-07-01 PROCEDURE — 2720000007 HC OR 272 NO HCPCS

## 2025-07-01 PROCEDURE — 71045 X-RAY EXAM CHEST 1 VIEW: CPT

## 2025-07-01 PROCEDURE — 2500000004 HC RX 250 GENERAL PHARMACY W/ HCPCS (ALT 636 FOR OP/ED): Performed by: RADIOLOGY

## 2025-07-01 PROCEDURE — 99223 1ST HOSP IP/OBS HIGH 75: CPT | Performed by: NURSE PRACTITIONER

## 2025-07-01 PROCEDURE — 36415 COLL VENOUS BLD VENIPUNCTURE: CPT | Performed by: NURSE PRACTITIONER

## 2025-07-01 PROCEDURE — 32408 CORE NDL BX LNG/MED PERQ: CPT

## 2025-07-01 PROCEDURE — 85610 PROTHROMBIN TIME: CPT | Performed by: NURSE PRACTITIONER

## 2025-07-01 PROCEDURE — 85027 COMPLETE CBC AUTOMATED: CPT | Performed by: NURSE PRACTITIONER

## 2025-07-01 PROCEDURE — 99153 MOD SED SAME PHYS/QHP EA: CPT

## 2025-07-01 PROCEDURE — 7100000010 HC PHASE TWO TIME - EACH INCREMENTAL 1 MINUTE

## 2025-07-01 PROCEDURE — 71045 X-RAY EXAM CHEST 1 VIEW: CPT | Performed by: RADIOLOGY

## 2025-07-01 PROCEDURE — 82947 ASSAY GLUCOSE BLOOD QUANT: CPT

## 2025-07-01 RX ORDER — LIDOCAINE HYDROCHLORIDE 10 MG/ML
INJECTION, SOLUTION EPIDURAL; INFILTRATION; INTRACAUDAL; PERINEURAL
Status: COMPLETED | OUTPATIENT
Start: 2025-07-01 | End: 2025-07-01

## 2025-07-01 RX ORDER — FENTANYL CITRATE 50 UG/ML
INJECTION, SOLUTION INTRAMUSCULAR; INTRAVENOUS
Status: COMPLETED | OUTPATIENT
Start: 2025-07-01 | End: 2025-07-01

## 2025-07-01 RX ORDER — MIDAZOLAM HYDROCHLORIDE 1 MG/ML
INJECTION, SOLUTION INTRAMUSCULAR; INTRAVENOUS
Status: COMPLETED | OUTPATIENT
Start: 2025-07-01 | End: 2025-07-01

## 2025-07-01 RX ADMIN — LIDOCAINE HYDROCHLORIDE 4 ML: 10 INJECTION, SOLUTION EPIDURAL; INFILTRATION; INTRACAUDAL; PERINEURAL at 10:26

## 2025-07-01 RX ADMIN — MIDAZOLAM 2 MG: 1 INJECTION INTRAMUSCULAR; INTRAVENOUS at 10:20

## 2025-07-01 RX ADMIN — FENTANYL CITRATE 50 MCG: 0.05 INJECTION, SOLUTION INTRAMUSCULAR; INTRAVENOUS at 10:20

## 2025-07-01 RX ADMIN — LIDOCAINE HYDROCHLORIDE 3 ML: 10 INJECTION, SOLUTION EPIDURAL; INFILTRATION; INTRACAUDAL; PERINEURAL at 10:29

## 2025-07-01 ASSESSMENT — PAIN SCALES - GENERAL

## 2025-07-01 ASSESSMENT — ENCOUNTER SYMPTOMS
COUGH: 0
SHORTNESS OF BREATH: 1
FATIGUE: 0
ENDOCRINE NEGATIVE: 1
EYES NEGATIVE: 1
PSYCHIATRIC NEGATIVE: 1
MUSCULOSKELETAL NEGATIVE: 1
CARDIOVASCULAR NEGATIVE: 1
GASTROINTESTINAL NEGATIVE: 1
ALLERGIC/IMMUNOLOGIC NEGATIVE: 1
CONSTITUTIONAL NEGATIVE: 1
HEMATOLOGIC/LYMPHATIC NEGATIVE: 1
NEUROLOGICAL NEGATIVE: 1

## 2025-07-01 ASSESSMENT — PAIN - FUNCTIONAL ASSESSMENT: PAIN_FUNCTIONAL_ASSESSMENT: 0-10

## 2025-07-01 ASSESSMENT — COLUMBIA-SUICIDE SEVERITY RATING SCALE - C-SSRS
1. IN THE PAST MONTH, HAVE YOU WISHED YOU WERE DEAD OR WISHED YOU COULD GO TO SLEEP AND NOT WAKE UP?: NO
2. HAVE YOU ACTUALLY HAD ANY THOUGHTS OF KILLING YOURSELF?: NO
6. HAVE YOU EVER DONE ANYTHING, STARTED TO DO ANYTHING, OR PREPARED TO DO ANYTHING TO END YOUR LIFE?: NO

## 2025-07-01 NOTE — PROGRESS NOTES
Physical Therapy                 Therapy Communication Note    Patient Name: Brandin Barfield  MRN: [id number]  Department: [facility]  Room: Room/bed info not found  Today's Date: 7/1/2025     Discipline: Physical Therapy    Missed Visit:       Missed Visit Reason:      Missed Time: No Show    Comment:     Pt DNS for appt.

## 2025-07-01 NOTE — Clinical Note
Dr Cortez aware of pox 91% on O2 3l nc, O2 increased to 6L NC. Right upper chest prepped and draped.

## 2025-07-01 NOTE — NURSING NOTE
END OF PROCEDURE MONITORING CRITERIA  01. BP is within +/- 20% of preprocedure  02. Oxygen sat is at 92% or above on RA, or existing order for O2 treatment, or at pre-sedation levels, otherwise new O2 order needed.  03. Unless the patient has a pre-procedure history of diminished level of consciousness, s/he is easily arousable and when aroused is able to responds appropriately for his/her age.  04. Significant complications related to the specific procedure are absent, have been controlled, or have been evaluated, including:      A. Pain.      B. Wound drainage.      C. All drains and tubes are patent.      D. Nausea and Vomiting. Vomiting is not persisten and has not occurred within 15 minutes prior to discharge.      E. Bladder distention. Voided bladder and/or no symptoms or urinary retention (e.g., bladder distention, frequent voiding in small amounts).      F. Neurovascular status.      G. Level of Consciousness consistent with pre procedural status.        Significant other  affirmed that they were driving the patient home.

## 2025-07-01 NOTE — DISCHARGE INSTRUCTIONS
No NSAIDS (ibuprofen, naproxen, aleve, advil) or aspirin for 48 hours after procedure. You may take tylenol if needed but do not exceed 10 tablets in 24 hour period    Patient and Family Education  What is a Needle Biopsy of the Lung?  A needle biopsy of the lung is one method used to help doctors diagnose lung disease. It helps  them decide how serious the lung disease is. A small piece of tissue is taken from your lung  using a special kind of needle. The tissue is sent to the lab to be looked at under a microscope.  The procedure can take up to 1 hour.  Before the Test:  ? If you take blood thinning medications, you Must ask your doctor when you should stop  taking the medicine. You may need to stop taking the medicine up to 7 days prior to the  test.  ? You will have a blood sample drawn.  ? Do Not Drink or Eat Anything after midnight the day before the test (unless your  doctor tells you otherwise).  ? You may take medications with a small amount of clear liquid.  ? If you have diabetes, ask your Radiologist or Radiology Nurse if you should take your  medicine or adjust the dosage before the test.  ? If you have an allergy to iodine or iodinated contrast, your primary doctor may prescribe  special pills to take before the test.  During the Test:  ? You will be lying on an X-Ray table.  ? You may be given medicine that will make you drowsy.  ? You will be given a local anesthetic to numb the biopsy site.  ? You will feel pressure during the biopsy.  After the Test:  ? You will remain on bed rest 2 to 4 hours.  ? You blood pressure, pulse and biopsy site will be checked often.  ? You will have 2 Chest X-Rays after the procedure.  ? If a large sample of tissue needs to be taken, you may be admitted to the hospital for  observation.     Page 2 of 2  Follow these Guidelines for a Safer Recovery:  ? Activity:  o Limit activity for 24 hours after the test.  o Avoid intense exercise and contact sports for 24 hours.  o  No driving for 24 hours. You will need someone to drive you home if you are an  outpatient.  o Do not do any heavy lifting, over 10 pounds, for 1 week.  ? Diet:  o You may resume your normal diet.  ? Medicines:  o If you take blood thinning medications, ask your doctor if you should stop the  medicine for 3 days after the test.  o You may take your other medicines as ordered by your doctor.  Call your Doctor Right Away if you have:  ? Bleeding from the biopsy site.  ? Shortness of breath or trouble breathing.  ? Increased pain at the biopsy site.  ? Dizziness or fainting.  ? Heart beating faster than normal.  ? If you are not able to contact your primary doctor, go to the nearest Emergency Room.  Also, Call your Doctor if you have:  ? Redness, swelling, drainage, or fluid at the biopsy site.  ? Temperature of 100.4 F degrees or higher.  ? Pain or tenderness at the procedure site.  ? Uncontrolled cough or persistent cough.  ? Any questions.     How to Reach your Doctor:  Call Dr. Obrien at [phone] with problems or questions.

## 2025-07-02 VITALS
HEART RATE: 62 BPM | RESPIRATION RATE: 18 BRPM | WEIGHT: 307 LBS | TEMPERATURE: 36.4 F | BODY MASS INDEX: 38.17 KG/M2 | OXYGEN SATURATION: 92 % | HEIGHT: 75 IN | DIASTOLIC BLOOD PRESSURE: 94 MMHG | SYSTOLIC BLOOD PRESSURE: 136 MMHG

## 2025-07-03 LAB
LABORATORY COMMENT REPORT: NORMAL
PATH REPORT.FINAL DX SPEC: NORMAL
PATH REPORT.GROSS SPEC: NORMAL
PATH REPORT.RELEVANT HX SPEC: NORMAL
PATH REPORT.TOTAL CANCER: NORMAL

## 2025-07-07 DIAGNOSIS — I26.99 PULMONARY EMBOLISM AND INFARCTION (MULTI): ICD-10-CM

## 2025-07-08 ENCOUNTER — TREATMENT (OUTPATIENT)
Dept: PHYSICAL THERAPY | Facility: CLINIC | Age: 65
End: 2025-07-08
Payer: MEDICAID

## 2025-07-08 DIAGNOSIS — G89.29 CHRONIC LEFT SHOULDER PAIN: ICD-10-CM

## 2025-07-08 DIAGNOSIS — M75.02 ADHESIVE CAPSULITIS OF LEFT SHOULDER: ICD-10-CM

## 2025-07-08 DIAGNOSIS — M25.512 CHRONIC LEFT SHOULDER PAIN: ICD-10-CM

## 2025-07-08 PROCEDURE — 97110 THERAPEUTIC EXERCISES: CPT | Mod: GP,CQ

## 2025-07-08 ASSESSMENT — PAIN SCALES - GENERAL: PAINLEVEL_OUTOF10: 2

## 2025-07-08 ASSESSMENT — PAIN DESCRIPTION - DESCRIPTORS: DESCRIPTORS: ACHING

## 2025-07-08 NOTE — PROGRESS NOTES
"Physical Therapy Treatment    Patient Name: Brandin Barfield  MRN: [id number]  Today's Date: 7/8/2025  Time Calculation  Start Time: 1159  Stop Time: 1232  Time Calculation (min): 33 min  PT Therapeutic Procedures Time Entry  Therapeutic Exercise Time Entry: 32    Insurance:  Visit number: 3 of 10  Authorization info: 2025: [employer] MEDICAID - AUTH AFTER 30V - 0 used per Epic, check w/pt / 100% COVERAGE / AVAILITY [id number] / ds 6/11/25 //   Insurance Type: Payor: [employer] MEDICAID / Plan: [email or web address] MEDICAID / Product Type: *No Product type* /     Current Problem   1. Chronic left shoulder pain  Follow Up In Physical Therapy      2. Adhesive capsulitis of left shoulder  Follow Up In Physical Therapy          Subjective   General   Reason for Referral: Left shoulder pain M25.512     Relevant Orders    Follow Up In Physical Therapy    Adhesive capsulitis of left shoulder M75.02    Relevant Orders    Follow Up In Physical Therapy  Referred By: Benjamin Dowell MD  Past Medical History Relevant to Rehab: ADHD, Anxiety, arrhythmia, arthritis, HTN, CHF, depression Eczema, Fx of lumbar spine, SVT, HTN, Lung nodule, obesity, Pneumonia, PE, L shoulder pain  General Comment: Pt reports he is using his L UE more during ADLs with less irritation.  Precautions:  Precautions  Precautions Comment: anticoagulation therapy  Pain   0-10 (Numeric) Pain Score: 2  Pain Type: Chronic pain  Pain Location: Shoulder  Pain Orientation: Left  Pain Descriptors: Aching  Post Treatment Pain Level 2/10    Objective   Pt continues to require moderate postural cueing during ther ex activities.    Treatments:  Therapeutic Exercise:  Therapeutic Exercise  Therapeutic Exercise Performed: Yes  Therapeutic Exercise Activity 1: UBE FWD/BWD x2' each  Therapeutic Exercise Activity 2: Slide board flexion L4 2x10  Therapeutic Exercise Activity 3: Slide board scaption L3 2x10  Therapeutic Exercise Activity 4: Scap retractions 5\" hold " "2x10  Therapeutic Exercise Activity 5: Rows L3 band 2x10  Therapeutic Exercise Activity 6: Shoulder extension L3 band 2x10  Therapeutic Exercise Activity 7: Supine AAROM (clasped hands) flexion 5\" hold 2x10  Therapeutic Exercise Activity 8: Supine AAROM ER with wand 5\" hold 2x10     Assessment   Assessment:   PT Assessment  Rehab Prognosis: Good  Evaluation/Treatment Tolerance: Patient tolerated treatment well  Assessment Comment: Pt tolerates ther ex activites without increased pain level, Pt utulizing L UE duringADLs moreoften.    Plan:   OP PT Plan  Treatment/Interventions: Other (comment) (Continues to preogress postural and L UE strengthening and flexibility as able.)  PT Plan: Skilled PT  Rehab Potential: Good    OP EDUCATION:  Outpatient Education  Individual(s) Educated: Patient  Education Provided: Home Exercise Program, Anatomy, Body Mechanics  Patient/Caregiver Demonstrated Understanding: yes  Patient Response to Education: Patient/Caregiver Verbalized Understanding of Information, Patient/Caregiver Performed Return Demonstration of Exercises/Activities, Patient/Caregiver Asked Appropriate Questions    Goals:   Active       PT Problem       PT Goals       Start:  06/12/25    Expected End:  09/10/25       STGs  1.  Pt to be independent with HEP in order to manage current condition  2.  Pt will report decreased pain L shoulder from 8/10 to </= 3/10 with reaching out to the side  3.  Pt will improve L shoulder AROM to equal R without pain in order to perform daily and work activities   LTGs  1.  Pt will increase strength L UE to 4+/5 MMT in order to complete daily and work related tasks  3.  Pt will improve UEFI score from 54/80 to 64/80 in order to demonstrate overall improved shoulder function           Patient Stated Goal 1       Start:  06/12/25    Expected End:  09/10/25       To have full use of his L arm               "

## 2025-07-15 ENCOUNTER — TREATMENT (OUTPATIENT)
Dept: PHYSICAL THERAPY | Facility: CLINIC | Age: 65
End: 2025-07-15
Payer: MEDICAID

## 2025-07-15 DIAGNOSIS — G89.29 CHRONIC LEFT SHOULDER PAIN: ICD-10-CM

## 2025-07-15 DIAGNOSIS — M25.512 CHRONIC LEFT SHOULDER PAIN: ICD-10-CM

## 2025-07-15 DIAGNOSIS — M75.02 ADHESIVE CAPSULITIS OF LEFT SHOULDER: ICD-10-CM

## 2025-07-15 PROCEDURE — 97035 APP MDLTY 1+ULTRASOUND EA 15: CPT | Mod: GP | Performed by: PHYSICAL THERAPIST

## 2025-07-15 PROCEDURE — 97110 THERAPEUTIC EXERCISES: CPT | Mod: GP | Performed by: PHYSICAL THERAPIST

## 2025-07-15 ASSESSMENT — PAIN SCALES - GENERAL: PAINLEVEL_OUTOF10: 8

## 2025-07-15 ASSESSMENT — PAIN DESCRIPTION - DESCRIPTORS: DESCRIPTORS: ACHING

## 2025-07-15 NOTE — PROGRESS NOTES
Physical Therapy Treatment/Progress Note    Patient Name: Brandin Barfield  MRN: [id number]  Today's Date: 7/15/2025  Time Calculation  Start Time: 1345  Stop Time: 1430  Time Calculation (min): 45 min  PT Modalities Time Entry  Ultrasound Time Entry: 8  PT Therapeutic Procedures Time Entry  Therapeutic Exercise Time Entry: 37    Insurance:  Visit number: 4 of 10  Authorization info: 2025: [employer] MEDICAID - AUTH AFTER 30V - 0 used per Epic, check w/pt / 100% COVERAGE / AVAILITY [id number] / ds 6/11/25 //   Insurance Type: Payor: [employer] MEDICAID / Plan: [employer] MEDICAID / Product Type: *No Product type* /     Current Problem   1. Chronic left shoulder pain  Follow Up In Physical Therapy      2. Adhesive capsulitis of left shoulder  Follow Up In Physical Therapy          Subjective   General   Reason for Referral: Left shoulder pain M25.512     Relevant Orders    Follow Up In Physical Therapy    Adhesive capsulitis of left shoulder M75.02    Relevant Orders    Follow Up In Physical Therapy  Referred By: Benjamin Dowell MD  Past Medical History Relevant to Rehab: ADHD, Anxiety, arrhythmia, arthritis, HTN, CHF, depression Eczema, Fx of lumbar spine, SVT, HTN, Lung nodule, obesity, Pneumonia, PE, L shoulder pain  General Comment: Pt states that he is having pain today  Precautions:  Precautions  Medical Precautions: Fall precautions  Precautions Comment: anticoagulation therapy  Pain   0-10 (Numeric) Pain Score: 8  Pain Type: Chronic pain  Pain Location: Shoulder  Pain Orientation: Left  Pain Descriptors: Aching  Post Treatment Pain Level stated better, but did not rate    Objective   Pt with increased thoracic kyphosis  Objective      Postural Observations     Additional Postural Observation Details  Rounded shoulders forward head  Improved with towel roll for support     Palpation      Additional Palpation Details  Pt with LUT tightness > R  Tender L AC joint     Active Range of Motion    Left Shoulder   Flexion: 98 degrees --------------131 degrees  Extension: 60 degrees ------------60 degrees  Abduction: 60 degrees --------------87 degrees  External rotation BTH: Active external rotation behind the head: 3/4 range. ----------full  Internal rotation BTB: Active internal rotation behind the back: to belt. -------------to belt same     Right Shoulder   Flexion: 143 degrees   Extension: 65 degrees   Abduction: 147 degrees   External rotation BTH: Active external rotation behind the head: full.   Internal rotation BTB: Active internal rotation behind the back: full.      Additional Active Range of Motion Details  Cervical flex full to chest  Ext 25 % no pain  SB R 30 degrees  SB L 35 degrees  R rotation 80 degrees  L rotation 70 degrees     Strength/Myotome Testing      Additional Strength Details  L shoulder  Flex  4+---------same  Abd middle 4 and post 4-  ----------4-  IR 4  ------------4+  ER 4+---------------same     R shoulder   4+ throughout with the exception of abd post 4 -  Distally elbow flex/ext 5/5  IR 4+   ER 4+  Flex  4-    Abd 3+   IR 4     ER 4-   Equal            Tests      Additional Tests Details  Empty can L (+) L UE               Outcome Measures:    [facility] 54/80---------------Pt with major decline with increased ADL disability noted to 27/80  Treatments:  Therapeutic Exercise:  Therapeutic Exercise  Therapeutic Exercise Performed: Yes  Therapeutic Exercise Activity 1: UBE FWD/BWD x2' each  Therapeutic Exercise Activity 2: pulleys flexion x20 with 5 ct hold  Therapeutic Exercise Activity 3: pulley abd with some pain  Therapeutic Exercise Activity 4: finger ladder abd better with no pain x10  Therapeutic Exercise Activity 5: UEFI  Therapeutic Exercise Activity 6: AROM MMT       Modalities  Ultrasound: Continuous at 100%, 3mHz at 1.2w/cm2, applied to L shoulder, in Seated, for 8 minutes     Assessment   Assessment:   PT Assessment  Assessment Comment: Pt making some progress  towards goals.  Pt with improved AROM L shoulder but with increased pain noted.  Pt also demonstrating increased disability with UEFI score.  US was performed today to help reduce pain level.  Will assess pt response next visit    Plan:   OP PT Plan  Duration: assess response to US.  Try MHP with TTS if pain level is still up with supine AAROM.    OP EDUCATION:  Outpatient Education  Individual(s) Educated: Patient  Education Provided: Anatomy, Physiology, Home Exercise Program, POC  Patient/Caregiver Demonstrated Understanding: yes  Plan of Care Discussed and Agreed Upon: yes  Patient Response to Education: Patient/Caregiver Verbalized Understanding of Information, Patient/Caregiver Performed Return Demonstration of Exercises/Activities    Goals:   Active       PT Problem       PT Goals       Start:  06/12/25    Expected End:  09/10/25       STGs  1.  Pt to be independent with HEP in order to manage current condition PROGRESSING  2.  Pt will report decreased pain L shoulder from 8/10 to </= 3/10 with reaching out to the side PROGRESSING  3.  Pt will improve L shoulder AROM to equal R without pain in order to perform daily and work activities PROGRESSING   LTGs  1.  Pt will increase strength L UE to 4+/5 MMT in order to complete daily and work related tasks PROGRESSING  3.  Pt will improve UEFI score from 54/80 to 64/80 in order to demonstrate overall improved shoulder function PROGRESSING           Patient Stated Goal 1 (Progressing)       Start:  06/12/25    Expected End:  09/10/25       To have full use of his L arm

## 2025-07-17 LAB
LABORATORY COMMENT REPORT: NORMAL
PATH REPORT.ADDENDUM SPEC: NORMAL
PATH REPORT.ADDENDUM SPEC: NORMAL
PATH REPORT.FINAL DX SPEC: NORMAL
PATH REPORT.GROSS SPEC: NORMAL
PATH REPORT.RELEVANT HX SPEC: NORMAL
PATH REPORT.TOTAL CANCER: NORMAL

## 2025-07-22 ENCOUNTER — OFFICE VISIT (OUTPATIENT)
Facility: CLINIC | Age: 65
End: 2025-07-22
Payer: MEDICAID

## 2025-07-22 ENCOUNTER — TREATMENT (OUTPATIENT)
Dept: PHYSICAL THERAPY | Facility: CLINIC | Age: 65
End: 2025-07-22
Payer: MEDICAID

## 2025-07-22 VITALS
BODY MASS INDEX: 39.04 KG/M2 | RESPIRATION RATE: 18 BRPM | HEIGHT: 75 IN | DIASTOLIC BLOOD PRESSURE: 75 MMHG | WEIGHT: 314 LBS | HEART RATE: 71 BPM | SYSTOLIC BLOOD PRESSURE: 113 MMHG

## 2025-07-22 DIAGNOSIS — R91.8 LUNG NODULES: ICD-10-CM

## 2025-07-22 DIAGNOSIS — M75.02 ADHESIVE CAPSULITIS OF LEFT SHOULDER: ICD-10-CM

## 2025-07-22 DIAGNOSIS — M25.512 CHRONIC LEFT SHOULDER PAIN: ICD-10-CM

## 2025-07-22 DIAGNOSIS — R91.1 PULMONARY NODULE: Primary | ICD-10-CM

## 2025-07-22 DIAGNOSIS — G89.29 CHRONIC LEFT SHOULDER PAIN: ICD-10-CM

## 2025-07-22 PROCEDURE — 99215 OFFICE O/P EST HI 40 MIN: CPT | Performed by: THORACIC SURGERY (CARDIOTHORACIC VASCULAR SURGERY)

## 2025-07-22 PROCEDURE — 97110 THERAPEUTIC EXERCISES: CPT | Mod: GP,CQ

## 2025-07-22 PROCEDURE — 3078F DIAST BP <80 MM HG: CPT | Performed by: THORACIC SURGERY (CARDIOTHORACIC VASCULAR SURGERY)

## 2025-07-22 PROCEDURE — 3008F BODY MASS INDEX DOCD: CPT | Performed by: THORACIC SURGERY (CARDIOTHORACIC VASCULAR SURGERY)

## 2025-07-22 PROCEDURE — 3074F SYST BP LT 130 MM HG: CPT | Performed by: THORACIC SURGERY (CARDIOTHORACIC VASCULAR SURGERY)

## 2025-07-22 ASSESSMENT — PATIENT HEALTH QUESTIONNAIRE - PHQ9
1. LITTLE INTEREST OR PLEASURE IN DOING THINGS: NOT AT ALL
SUM OF ALL RESPONSES TO PHQ9 QUESTIONS 1 AND 2: 0
2. FEELING DOWN, DEPRESSED OR HOPELESS: NOT AT ALL

## 2025-07-22 ASSESSMENT — ENCOUNTER SYMPTOMS
DIARRHEA: 0
CHOKING: 0
VOMITING: 0
EYES NEGATIVE: 1
FEVER: 0
SHORTNESS OF BREATH: 0
UNEXPECTED WEIGHT CHANGE: 0
ALLERGIC/IMMUNOLOGIC NEGATIVE: 1
COUGH: 0
MUSCULOSKELETAL NEGATIVE: 1
APPETITE CHANGE: 0
CONSTIPATION: 0
WHEEZING: 0
NEUROLOGICAL NEGATIVE: 1
OCCASIONAL FEELINGS OF UNSTEADINESS: 0
CHEST TIGHTNESS: 0
STRIDOR: 0
NAUSEA: 0
FATIGUE: 0
ENDOCRINE NEGATIVE: 1
CHILLS: 0
DEPRESSION: 0
PSYCHIATRIC NEGATIVE: 1
ABDOMINAL PAIN: 0
PALPITATIONS: 0
HEMATOLOGIC/LYMPHATIC NEGATIVE: 1
DIAPHORESIS: 0
ABDOMINAL DISTENTION: 0
LOSS OF SENSATION IN FEET: 0

## 2025-07-22 ASSESSMENT — LIFESTYLE VARIABLES
HOW OFTEN DO YOU HAVE A DRINK CONTAINING ALCOHOL: NEVER
SKIP TO QUESTIONS 9-10: 1
AUDIT-C TOTAL SCORE: 0
HOW MANY STANDARD DRINKS CONTAINING ALCOHOL DO YOU HAVE ON A TYPICAL DAY: PATIENT DOES NOT DRINK
HOW OFTEN DO YOU HAVE SIX OR MORE DRINKS ON ONE OCCASION: NEVER

## 2025-07-22 ASSESSMENT — PAIN SCALES - GENERAL
PAINLEVEL_OUTOF10: 2
PAINLEVEL_OUTOF10: 0-NO PAIN

## 2025-07-22 NOTE — PROGRESS NOTES
Subjective   Patient ID: Brandin Barfield is a 64 y.o. male who presents for No chief complaint on file..  HPI  64-year-old male who was found to have a 1.5 cm right upper lobe lung nodule.  He underwent a PET scan that showed that this nodule had an SUV max of 1.7.  There was some activity in his mediastinal lymph nodes.  He underwent EBUS biopsy of the mediastinal lymph nodes which were negative for cancer.  He has a history of heavy smoking.  His PFTs revealed an FEV1 of 87% predicted and a DLCO of 98% predicted.  I discussion with him about next steps.  And we should biopsy of these right upper lobe lung nodule before making a decision about resection.  He will require lobectomy for it.  I will refer him to interventional radiology for a biopsy of the right upper lobe lung nodule and decide next steps.    Update 7/22/2025    He has been doing well.  Biopsy revealed granulomatous inflammation.  Seems like were not dealing with a malignancy here.  I will see him again in 6 months with a CT of the chest.    Review of Systems   Constitutional:  Negative for appetite change, chills, diaphoresis, fatigue, fever and unexpected weight change.   HENT: Negative.     Eyes: Negative.    Respiratory:  Negative for cough, choking, chest tightness, shortness of breath, wheezing and stridor.    Cardiovascular:  Negative for chest pain, palpitations and leg swelling.   Gastrointestinal:  Negative for abdominal distention, abdominal pain, constipation, diarrhea, nausea and vomiting.   Endocrine: Negative.    Genitourinary: Negative.    Musculoskeletal: Negative.    Skin: Negative.    Allergic/Immunologic: Negative.    Neurological: Negative.    Hematological: Negative.    Psychiatric/Behavioral: Negative.     All other systems reviewed and are negative.      Objective   Physical Exam  Constitutional:       Appearance: Normal appearance.   HENT:      Head: Normocephalic and atraumatic.      Nose: Nose normal.      Mouth/Throat:       Mouth: Mucous membranes are moist.      Pharynx: Oropharynx is clear.     Eyes:      Extraocular Movements: Extraocular movements intact.      Conjunctiva/sclera: Conjunctivae normal.      Pupils: Pupils are equal, round, and reactive to light.       Cardiovascular:      Rate and Rhythm: Normal rate and regular rhythm.      Pulses: Normal pulses.      Heart sounds: Normal heart sounds.   Pulmonary:      Effort: Pulmonary effort is normal. No respiratory distress.      Breath sounds: Normal breath sounds. No stridor. No wheezing, rhonchi or rales.   Chest:      Chest wall: No tenderness.   Abdominal:      General: Abdomen is flat. Bowel sounds are normal.      Palpations: Abdomen is soft.     Musculoskeletal:         General: Normal range of motion.      Cervical back: Normal range of motion and neck supple.     Skin:     General: Skin is warm and dry.      Capillary Refill: Capillary refill takes less than 2 seconds.     Neurological:      General: No focal deficit present.      Mental Status: He is alert and oriented to person, place, and time.         Assessment/Plan   Diagnoses and all orders for this visit:  Pulmonary nodule    CT scan in 6 months         Ahsan Obrien MD 07/22/25 10:46 AM

## 2025-07-22 NOTE — PROGRESS NOTES
Physical Therapy Treatment    Patient Name: Brandin Barfield  MRN: [id number]  Today's Date: 7/22/2025  Time Calculation  Start Time: 1430  Stop Time: 1515  Time Calculation (min): 45 min  PT Therapeutic Procedures Time Entry  Therapeutic Exercise Time Entry: 41    Insurance:  Visit number: 5 of 10  Authorization info: 2025: [employer] MEDICAID - AUTH AFTER 30V - 0 used per Epic, check w/pt / 100% COVERAGE / AVAILITY [id number] / ds 6/11/25 //   Insurance Type: Payor: [employer] MEDICAID / Plan: [employer] MEDICAID / Product Type: *No Product type* /     Current Problem   1. Chronic left shoulder pain  Follow Up In Physical Therapy      2. Adhesive capsulitis of left shoulder  Follow Up In Physical Therapy          Subjective   General   Reason for Referral: Left shoulder pain M25.512     Relevant Orders    Follow Up In Physical Therapy    Adhesive capsulitis of left shoulder M75.02    Relevant Orders    Follow Up In Physical Therapy  Referred By: Benjamin Dowell MD  Past Medical History Relevant to Rehab: ADHD, Anxiety, arrhythmia, arthritis, HTN, CHF, depression Eczema, Fx of lumbar spine, SVT, HTN, Lung nodule, obesity, Pneumonia, PE, L shoulder pain  General Comment: Pt reports short, temporary improvements after US last visit.  Precautions:  Precautions  Medical Precautions: Fall precautions  Precautions Comment: anticoagulation therapy  Pain   0-10 (Numeric) Pain Score: 2  Pain Type: Chronic pain  Post Treatment Pain Level 1-2/10    Objective   Pt required moderate postural cues during ther ex this visit, observed increased in scap adduction movement.    Treatments:  Therapeutic Exercise:  Therapeutic Exercise  Therapeutic Exercise Performed: Yes  Therapeutic Exercise Activity 1: UBE FWD/BWD x2' each  Therapeutic Exercise Activity 2: pulleys flexion x20 with 5 ct hold  Therapeutic Exercise Activity 3: pulley abd with some pain  Therapeutic Exercise Activity 4: Scap retractions with L3 band  "5\" hold 2x10  Therapeutic Exercise Activity 5: Rows L3 band 2x10  Therapeutic Exercise Activity 6: Shoulder extension L3 band 2x10  Therapeutic Exercise Activity 7: Slide board flexion L2 with MH 2x10  Therapeutic Exercise Activity 8: Slide board abduction with MH 2x10    Modalities  Moist heat pack applied to L shoulder during slide board activities.     Assessment   Assessment:   PT Assessment  Rehab Prognosis: Good  Evaluation/Treatment Tolerance: Patient tolerated treatment well  Assessment Comment: Improved scap adduction range this visit, however, continues to require moderate cueing for performance.  Tolerates treatment wit mild reduction in pain level.    Plan:   OP PT Plan  Treatment/Interventions: Other (comment) (Continues to preogress postural and L UE strengthening and flexibility as able.)  PT Plan: Skilled PT  Rehab Potential: Good    OP EDUCATION:  Outpatient Education  Individual(s) Educated: Patient  Education Provided: Body Mechanics, Anatomy, Posture  Patient/Caregiver Demonstrated Understanding: yes  Patient Response to Education: Patient/Caregiver Verbalized Understanding of Information, Patient/Caregiver Performed Return Demonstration of Exercises/Activities, Patient/Caregiver Asked Appropriate Questions    Goals:   Active       PT Problem       PT Goals       Start:  06/12/25    Expected End:  09/10/25       STGs  1.  Pt to be independent with HEP in order to manage current condition PROGRESSING  2.  Pt will report decreased pain L shoulder from 8/10 to </= 3/10 with reaching out to the side PROGRESSING  3.  Pt will improve L shoulder AROM to equal R without pain in order to perform daily and work activities PROGRESSING   LTGs  1.  Pt will increase strength L UE to 4+/5 MMT in order to complete daily and work related tasks PROGRESSING  3.  Pt will improve UEFI score from 54/80 to 64/80 in order to demonstrate overall improved shoulder function PROGRESSING           Patient Stated Goal 1 " (Progressing)       Start:  06/12/25    Expected End:  09/10/25       To have full use of his L arm

## 2025-07-29 ENCOUNTER — TREATMENT (OUTPATIENT)
Dept: PHYSICAL THERAPY | Facility: CLINIC | Age: 65
End: 2025-07-29
Payer: MEDICAID

## 2025-07-29 DIAGNOSIS — M75.02 ADHESIVE CAPSULITIS OF LEFT SHOULDER: ICD-10-CM

## 2025-07-29 DIAGNOSIS — G89.29 CHRONIC LEFT SHOULDER PAIN: ICD-10-CM

## 2025-07-29 DIAGNOSIS — M25.512 CHRONIC LEFT SHOULDER PAIN: ICD-10-CM

## 2025-07-29 PROCEDURE — 97110 THERAPEUTIC EXERCISES: CPT | Mod: GP,CQ

## 2025-07-29 ASSESSMENT — PAIN SCALES - GENERAL: PAINLEVEL_OUTOF10: 2

## 2025-07-29 ASSESSMENT — PAIN DESCRIPTION - DESCRIPTORS: DESCRIPTORS: ACHING

## 2025-07-29 NOTE — PROGRESS NOTES
Physical Therapy Treatment    Patient Name: Brandin Barfield  MRN: [id number]  Today's Date: 7/29/2025  Time Calculation  Start Time: 1345  Stop Time: 1430  Time Calculation (min): 45 min  PT Therapeutic Procedures Time Entry  Therapeutic Exercise Time Entry: 43    Insurance:  Visit number: 6 of 10  Authorization info: 2025: [employer] MEDICAID - AUTH AFTER 30V - 0 used per Epic, check w/pt / 100% COVERAGE / AVAILITY [id number] / ds 6/11/25 //   Insurance Type: Payor: [employer] MEDICAID / Plan: [employer] MEDICAID / Product Type: *No Product type* /     Current Problem   1. Chronic left shoulder pain  Follow Up In Physical Therapy      2. Adhesive capsulitis of left shoulder  Follow Up In Physical Therapy          Subjective   General   Reason for Referral: Left shoulder pain M25.512     Relevant Orders    Follow Up In Physical Therapy    Adhesive capsulitis of left shoulder M75.02    Relevant Orders    Follow Up In Physical Therapy  Referred By: Benjamin Dowell MD  Past Medical History Relevant to Rehab: ADHD, Anxiety, arrhythmia, arthritis, HTN, CHF, depression Eczema, Fx of lumbar spine, SVT, HTN, Lung nodule, obesity, Pneumonia, PE, L shoulder pain  General Comment: Decreased pain frequency during ADLs and home projects.  Precautions:  Precautions  Medical Precautions: Fall precautions  Precautions Comment: anticoagulation therapy  Pain   0-10 (Numeric) Pain Score: 2  Pain Type: Chronic pain  Pain Location: Shoulder  Pain Orientation: Left  Pain Descriptors: Aching  Post Treatment Pain Level 1/10    Objective   AROM  ER in sidelying to neutral.    Treatments:  Therapeutic Exercise:  Therapeutic Exercise  Therapeutic Exercise Performed: Yes  Therapeutic Exercise Activity 1: UBE FWD/BWD x2' each  Therapeutic Exercise Activity 2: Slide board flexion L4 2x10  Therapeutic Exercise Activity 3: Slide board abduction L4 2x10  Therapeutic Exercise Activity 4: Wall wipes CW, CCW 2x10  "each  Therapeutic Exercise Activity 5: Scap retractions with L4 band 5\" hold 2x10  Therapeutic Exercise Activity 6: Rows L4 band 2x10  Therapeutic Exercise Activity 7: Shoulder extension L4 band 2x10  Therapeutic Exercise Activity 8: Shoulder arch 2 rounds  Therapeutic Exercise Activity 9: Pinch clips red 2 rounds  Therapeutic Exercise Activity 10: Sidelying ER 2x10     Assessment   Assessment:   PT Assessment  Rehab Prognosis: Good  Evaluation/Treatment Tolerance: Patient tolerated treatment well  Assessment Comment: Pt tolerates ther ex progressions with transient increases in pain level, decreased pain level on completion of activities.    Plan:   OP PT Plan  Treatment/Interventions: Other (comment) (Continues to preogress postural and L UE strengthening and flexibility as able.)  PT Plan: Skilled PT  Rehab Potential: Good    OP EDUCATION:  Outpatient Education  Individual(s) Educated: Patient  Education Provided: Anatomy, Body Mechanics  Patient/Caregiver Demonstrated Understanding: yes  Patient Response to Education: Patient/Caregiver Verbalized Understanding of Information, Patient/Caregiver Performed Return Demonstration of Exercises/Activities, Patient/Caregiver Asked Appropriate Questions    Goals:   Active       PT Problem       PT Goals       Start:  06/12/25    Expected End:  09/10/25       STGs  1.  Pt to be independent with HEP in order to manage current condition PROGRESSING  2.  Pt will report decreased pain L shoulder from 8/10 to </= 3/10 with reaching out to the side PROGRESSING  3.  Pt will improve L shoulder AROM to equal R without pain in order to perform daily and work activities PROGRESSING   LTGs  1.  Pt will increase strength L UE to 4+/5 MMT in order to complete daily and work related tasks PROGRESSING  3.  Pt will improve UEFI score from 54/80 to 64/80 in order to demonstrate overall improved shoulder function PROGRESSING           Patient Stated Goal 1 (Progressing)       Start:  " 06/12/25    Expected End:  09/10/25       To have full use of his L arm

## 2025-07-31 ENCOUNTER — OFFICE VISIT (OUTPATIENT)
Dept: ORTHOPEDIC SURGERY | Facility: CLINIC | Age: 65
End: 2025-07-31
Payer: MEDICAID

## 2025-07-31 VITALS — BODY MASS INDEX: 39.04 KG/M2 | HEIGHT: 75 IN | WEIGHT: 314 LBS

## 2025-07-31 DIAGNOSIS — M75.52 BURSITIS OF LEFT SHOULDER: ICD-10-CM

## 2025-07-31 DIAGNOSIS — M75.02 ADHESIVE CAPSULITIS OF LEFT SHOULDER: ICD-10-CM

## 2025-07-31 DIAGNOSIS — M25.512 LEFT SHOULDER PAIN, UNSPECIFIED CHRONICITY: ICD-10-CM

## 2025-07-31 PROCEDURE — 3008F BODY MASS INDEX DOCD: CPT | Performed by: ORTHOPAEDIC SURGERY

## 2025-07-31 PROCEDURE — 99213 OFFICE O/P EST LOW 20 MIN: CPT | Performed by: ORTHOPAEDIC SURGERY

## 2025-07-31 PROCEDURE — 99213 OFFICE O/P EST LOW 20 MIN: CPT | Mod: 25 | Performed by: ORTHOPAEDIC SURGERY

## 2025-07-31 PROCEDURE — 2500000004 HC RX 250 GENERAL PHARMACY W/ HCPCS (ALT 636 FOR OP/ED): Performed by: ORTHOPAEDIC SURGERY

## 2025-07-31 PROCEDURE — 20610 DRAIN/INJ JOINT/BURSA W/O US: CPT | Mod: LT | Performed by: ORTHOPAEDIC SURGERY

## 2025-07-31 RX ORDER — TRIAMCINOLONE ACETONIDE 40 MG/ML
10 INJECTION, SUSPENSION INTRA-ARTICULAR; INTRAMUSCULAR
Status: COMPLETED | OUTPATIENT
Start: 2025-07-31 | End: 2025-07-31

## 2025-07-31 RX ORDER — LIDOCAINE HYDROCHLORIDE 10 MG/ML
1 INJECTION, SOLUTION INFILTRATION; PERINEURAL
Status: COMPLETED | OUTPATIENT
Start: 2025-07-31 | End: 2025-07-31

## 2025-07-31 RX ADMIN — LIDOCAINE HYDROCHLORIDE 1 ML: 10 INJECTION, SOLUTION INFILTRATION; PERINEURAL at 14:13

## 2025-07-31 RX ADMIN — TRIAMCINOLONE ACETONIDE 10 MG: 400 INJECTION, SUSPENSION INTRA-ARTICULAR; INTRAMUSCULAR at 14:13

## 2025-07-31 ASSESSMENT — COLUMBIA-SUICIDE SEVERITY RATING SCALE - C-SSRS
2. HAVE YOU ACTUALLY HAD ANY THOUGHTS OF KILLING YOURSELF?: NO
6. HAVE YOU EVER DONE ANYTHING, STARTED TO DO ANYTHING, OR PREPARED TO DO ANYTHING TO END YOUR LIFE?: NO
1. IN THE PAST MONTH, HAVE YOU WISHED YOU WERE DEAD OR WISHED YOU COULD GO TO SLEEP AND NOT WAKE UP?: NO

## 2025-07-31 ASSESSMENT — PAIN SCALES - GENERAL
PAINLEVEL_OUTOF10: 7
PAINLEVEL_OUTOF10: 7

## 2025-07-31 ASSESSMENT — ENCOUNTER SYMPTOMS
LOSS OF SENSATION IN FEET: 0
OCCASIONAL FEELINGS OF UNSTEADINESS: 0
DEPRESSION: 0

## 2025-07-31 ASSESSMENT — LIFESTYLE VARIABLES
HOW OFTEN DO YOU HAVE SIX OR MORE DRINKS ON ONE OCCASION: NEVER
HOW OFTEN DO YOU HAVE A DRINK CONTAINING ALCOHOL: NEVER

## 2025-07-31 ASSESSMENT — PATIENT HEALTH QUESTIONNAIRE - PHQ9
SUM OF ALL RESPONSES TO PHQ9 QUESTIONS 1 AND 2: 0
2. FEELING DOWN, DEPRESSED OR HOPELESS: NOT AT ALL
1. LITTLE INTEREST OR PLEASURE IN DOING THINGS: NOT AT ALL

## 2025-07-31 ASSESSMENT — PAIN - FUNCTIONAL ASSESSMENT: PAIN_FUNCTIONAL_ASSESSMENT: 0-10

## 2025-07-31 NOTE — PATIENT INSTRUCTIONS
Thank you for coming to see us today!     Continue to use tylenol for pain control.   Rest, ice and elevate and remember to do exercises.   Keep your scheduled appointment to start into physical therapy.    You received a cortisone injection into your left shoulder. Please call our office at [phone] immediately if you develop increased pain, redness, warmth, or drainage from the injection site.     Follow up as needed

## 2025-07-31 NOTE — PROGRESS NOTES
Subjective      Chief Complaint   Patient presents with    Left Shoulder - Pain, Follow-up        Surgical History[1]     HPI  This 64 year old patient presents today for reevaluation of left shoulder pain which he rates at 8/10 and also left shoulder loss of motion. He was graciously referred to this office by his PCP, Dr. Dowell, for further evaluation and treatment of left shoulder pain. and loss of motion. X-rays taken on 5/20/25 were negative for fracture.The patient states that the left shoulder pain has been present for several months. The patient denies trauma or injury. The patient states that the left shoulder pain and loss of motion are worse with and aggravated by attempting to move his left shoulder and also reaching and lifting. The patient states that this shoulder pain  and loss of motion are disabling and presents today to discuss further options. The patient states that they have tried tylenol and is unable to take NSAIDS due to being on Eliquis. He reports no relief with Tylenol.  He has been attending physical therapy and is also performing his home exercise program as directed. He received a cortisone injection into his left shoulder at his last office visit on 5/29/25 and states that it provided him some relief of symptoms.   Still unable to fully move shoulder as much as he was, PT is going well, making small improvements day by day.     CARDIOLOGY:   Negative for chest pain, shortness of breath.   RESPIRATORY:   Negative for chest pain, shortness of breath.   MUSCULOSKELETAL:   See HPI for details.   NEUROLOGY:   Negative for tingling, numbness, weakness.    Objective      There were no vitals taken for this visit.     SHOULDER EXAM  Constitutional: Appears stated age. No apparent distress  Labored Breathing: No  Psychiatric: Normal mood and effect.   Neurological: alert and oriented x3  Skin: intact  HEENT: No bruising, otorrhea, rhinorrhea.  MUSCULOSKELETAL: Neck: No tenderness. No pain  or limitation with range of motion. Back: No tenderness. Straight leg test negative bilaterally. left shoulder: There is tenderness anteriorly and laterally. Active abduction and active flexion are 0- 95 degrees but with pain and guarding.  Active assisted abduction and active assisted flexion are 0 to 110 degrees but with pain and guarding.  There is pain with and limitation of active and passive internal and external rotation. Comparments are soft. Neurovascular is intact.  right shoulder: Nontender.  Full active and passive painless range of motion.  XR shoulder left 2+ views  Listed below are reviewed with the patient in the office today.  Result Date: 5/22/2025  Interpreted By:  Georges Rai, STUDY: XR SHOULDER LEFT 2+ VIEWS;  5/21/2025 2:22 pm   INDICATION: Signs/Symptoms:chronic left shoulder pain and frozen shoulder.   COMPARISON: None   ACCESSION NUMBER(S): [id number]   ORDERING CLINICIAN: JOSE IYER   FINDINGS: Interval redemonstration of possible remote fracture deformity of the distal tip of the left clavicle. This appears unchanged since prior exam 11/27/2022 chest radiograph. Moderate AC osteoarthrosis. Mild glenohumeral osteoarthrosis. No fracture or dislocation. No osseous lesion.       No fracture or dislocation. No osseous lesion.     MACRO: none   Signed by: Georges Rai 5/22/2025 2:03 PM Dictation workstation:   [id number]        Subjective    Patient ID: Brandin Barfield is a 64 y.o. male.    Patient ID: Brandin Barfield is a 64 y.o. male.  1. Left shoulder pain, unspecified chronicity  L Inj/Asp: L subacromial bursa      2. Adhesive capsulitis of left shoulder  L Inj/Asp: L subacromial bursa      3. Bursitis of left shoulder  L Inj/Asp: L subacromial bursa          L Inj/Asp: L subacromial bursa on 7/31/2025 2:13 PM  Indications: pain  Details: 22 G needle, lateral approach  Medications: 1 mL lidocaine 10 mg/mL (1 %); 10 mg triamcinolone acetonide 40 mg/mL  Outcome: tolerated well, no  immediate complications  Procedure, treatment alternatives, risks and benefits explained, specific risks discussed. Immediately prior to procedure a time out was called to verify the correct patient, procedure, equipment, support staff and site/side marked as required. Patient was prepped and draped in the usual sterile fashion.           Options are discussed with the patient in detail. The patient is  Instructed to keep his appointment with physical therapy to evaluate and treat with gentle strengthening and ROM exercises with modalities as needed. The patient is instructed regarding activity modification and risk for further injury with falling or trauma, ice, provider directed at home gentle strengthening and ROM exercises, and the appropriate use of Tylenol as needed for pain with its potential adverse reactions and side effects. The patient understands.  He states that despite exercises that this left shoulder pain and loss of motion are disabling and he requests a discussion of further options.  An injection to the left shoulder is discussed in the office today.  The patient states that he did get at least some relief with previous cortisone injection and he requests cortisone injection to the left shoulder today.  This is done today.  See procedures.  Return in 3 months for reevaluation or sooner as needed. Please note that this report has been produced using speech recognition software.  It may contain errors related to grammar, punctuation or spelling.  Electronically signed, but not reviewed.   Doug Johnson MD               [1]   Past Surgical History:  Procedure Laterality Date    CARDIAC CATHETERIZATION  03/23    CARDIAC ELECTROPHYSIOLOGY STUDY AND ABLATION      CIRCUMCISION, PRIMARY  1960    MR HEAD ANGIO WO IV CONTRAST  06/21/2017    MR HEAD ANGIO WO IV CONTRAST 6/21/2017 [facility]    MR NECK ANGIO WO IV CONTRAST  06/21/2017    MR NECK ANGIO WO IV CONTRAST 6/21/2017 [facility]    OTHER SURGICAL HISTORY  11/07/2022    No history of surgery    WISDOM TOOTH EXTRACTION  2020

## 2025-08-04 DIAGNOSIS — I50.22 CHRONIC SYSTOLIC CONGESTIVE HEART FAILURE: ICD-10-CM

## 2025-08-04 DIAGNOSIS — G25.0 BENIGN ESSENTIAL TREMOR: ICD-10-CM

## 2025-08-04 DIAGNOSIS — I26.99 PULMONARY EMBOLISM AND INFARCTION (MULTI): ICD-10-CM

## 2025-08-04 DIAGNOSIS — I42.0 DILATED CARDIOMYOPATHY (MULTI): ICD-10-CM

## 2025-08-05 ENCOUNTER — TREATMENT (OUTPATIENT)
Dept: PHYSICAL THERAPY | Facility: CLINIC | Age: 65
End: 2025-08-05
Payer: MEDICAID

## 2025-08-05 DIAGNOSIS — M25.512 CHRONIC LEFT SHOULDER PAIN: ICD-10-CM

## 2025-08-05 DIAGNOSIS — M75.02 ADHESIVE CAPSULITIS OF LEFT SHOULDER: ICD-10-CM

## 2025-08-05 DIAGNOSIS — G89.29 CHRONIC LEFT SHOULDER PAIN: ICD-10-CM

## 2025-08-05 PROCEDURE — 97110 THERAPEUTIC EXERCISES: CPT | Mod: GP,CQ

## 2025-08-05 RX ORDER — SACUBITRIL AND VALSARTAN 24; 26 MG/1; MG/1
1 TABLET, FILM COATED ORAL
Qty: 60 TABLET | Refills: 5 | Status: SHIPPED | OUTPATIENT
Start: 2025-08-05

## 2025-08-05 RX ORDER — GABAPENTIN 100 MG/1
100 CAPSULE ORAL 3 TIMES DAILY
Qty: 90 CAPSULE | Refills: 2 | Status: SHIPPED | OUTPATIENT
Start: 2025-08-05

## 2025-08-05 ASSESSMENT — PAIN SCALES - GENERAL: PAINLEVEL_OUTOF10: 2

## 2025-08-05 ASSESSMENT — PAIN DESCRIPTION - DESCRIPTORS: DESCRIPTORS: ACHING

## 2025-08-05 NOTE — PROGRESS NOTES
Physical Therapy Treatment    Patient Name: Brandin Barfield  MRN: [id number]  Today's Date: 8/5/2025  Time Calculation  Start Time: 1345  Stop Time: 1430  Time Calculation (min): 45 min  PT Therapeutic Procedures Time Entry  Therapeutic Exercise Time Entry: 43    Insurance:  Visit number: 7 of 10  Authorization info: 2025: [employer] MEDICAID - AUTH AFTER 30V - 0 used per Epic, check w/pt / 100% COVERAGE / AVAILITY [id number] / ds 6/11/25 //     Insurance Type: Payor: [employer] MEDICAID / Plan: [employer] MEDICAID / Product Type: *No Product type* /     Current Problem   1. Chronic left shoulder pain  Follow Up In Physical Therapy      2. Adhesive capsulitis of left shoulder  Follow Up In Physical Therapy          Subjective   General   Reason for Referral: Left shoulder pain M25.512     Relevant Orders    Follow Up In Physical Therapy    Adhesive capsulitis of left shoulder M75.02    Relevant Orders    Follow Up In Physical Therapy  Referred By: Benjamin Dowell MD  Past Medical History Relevant to Rehab: ADHD, Anxiety, arrhythmia, arthritis, HTN, CHF, depression Eczema, Fx of lumbar spine, SVT, HTN, Lung nodule, obesity, Pneumonia, PE, L shoulder pain  General Comment: Pt reports decreased frequency of pain S/s during ADLs recently.  Precautions:  Precautions  Medical Precautions: Fall precautions  Precautions Comment: anticoagulation therapy  Pain   0-10 (Numeric) Pain Score: 2  Pain Type: Chronic pain  Pain Location: Shoulder  Pain Orientation: Left  Pain Descriptors: Aching  Post Treatment Pain Level 2/10    Objective   AROM L shoulder flexion 135 degrees, abduction 96 degrees after ther ex.    Treatments:  Therapeutic Exercise:  Therapeutic Exercise  Therapeutic Exercise Performed: Yes  Therapeutic Exercise Activity 1: UBE FWD/BWD x2' each  Therapeutic Exercise Activity 2: Shoulder arch 2 rounds  Therapeutic Exercise Activity 3: Pinch clips red 2 rounds  Therapeutic Exercise Activity 4:  "Slide board flexion L5 2x10  Therapeutic Exercise Activity 5: Slide board abduction L4 2x10  Therapeutic Exercise Activity 6: Supine stretch in ER with wand 5\" hold 2x10  Therapeutic Exercise Activity 7: Sidelying ER 1# 2x10  Therapeutic Exercise Activity 8: Scap retractions with L4 band 5\" hold 2x10  Therapeutic Exercise Activity 9: Rows L4 band 2x10  Therapeutic Exercise Activity 10: Shoulder extension L4 band 2x10     Assessment   Assessment:   PT Assessment  Rehab Prognosis: Good  Evaluation/Treatment Tolerance: Patient tolerated treatment well  Assessment Comment: Pt demonstrates improved L shoulder AROM as noted, tolerates treatment without increased pain S/S    Plan:   OP PT Plan  Treatment/Interventions: Other (comment) (Continues to preogress postural and L UE strengthening and flexibility as able.)  PT Plan: Skilled PT  Rehab Potential: Good    OP EDUCATION:  Outpatient Education  Individual(s) Educated: Patient  Education Provided: Posture, Body Mechanics  Patient/Caregiver Demonstrated Understanding: yes  Patient Response to Education: Patient/Caregiver Verbalized Understanding of Information, Patient/Caregiver Performed Return Demonstration of Exercises/Activities, Patient/Caregiver Asked Appropriate Questions    Goals:   Active       PT Problem       PT Goals       Start:  06/12/25    Expected End:  09/10/25       STGs  1.  Pt to be independent with HEP in order to manage current condition PROGRESSING  2.  Pt will report decreased pain L shoulder from 8/10 to </= 3/10 with reaching out to the side PROGRESSING  3.  Pt will improve L shoulder AROM to equal R without pain in order to perform daily and work activities PROGRESSING   LTGs  1.  Pt will increase strength L UE to 4+/5 MMT in order to complete daily and work related tasks PROGRESSING  3.  Pt will improve UEFI score from 54/80 to 64/80 in order to demonstrate overall improved shoulder function PROGRESSING           Patient Stated Goal 1 " (Progressing)       Start:  06/12/25    Expected End:  09/10/25       To have full use of his L arm

## 2025-08-06 ENCOUNTER — OFFICE VISIT (OUTPATIENT)
Facility: CLINIC | Age: 65
End: 2025-08-06
Payer: MEDICAID

## 2025-08-06 VITALS
WEIGHT: 313 LBS | OXYGEN SATURATION: 95 % | SYSTOLIC BLOOD PRESSURE: 132 MMHG | BODY MASS INDEX: 39.12 KG/M2 | HEART RATE: 85 BPM | DIASTOLIC BLOOD PRESSURE: 88 MMHG

## 2025-08-06 DIAGNOSIS — I26.99 PULMONARY EMBOLISM AND INFARCTION (MULTI): ICD-10-CM

## 2025-08-06 DIAGNOSIS — I50.20 SYSTOLIC HEART FAILURE: ICD-10-CM

## 2025-08-06 DIAGNOSIS — I26.99 PULMONARY EMBOLISM WITHOUT ACUTE COR PULMONALE: ICD-10-CM

## 2025-08-06 DIAGNOSIS — I42.9 CARDIOMYOPATHY, UNSPECIFIED TYPE (MULTI): ICD-10-CM

## 2025-08-06 DIAGNOSIS — I10 HTN (HYPERTENSION), BENIGN: ICD-10-CM

## 2025-08-06 DIAGNOSIS — I42.9 CARDIOMYOPATHY: Primary | ICD-10-CM

## 2025-08-06 PROCEDURE — 3075F SYST BP GE 130 - 139MM HG: CPT | Performed by: INTERNAL MEDICINE

## 2025-08-06 PROCEDURE — 99213 OFFICE O/P EST LOW 20 MIN: CPT | Performed by: INTERNAL MEDICINE

## 2025-08-06 PROCEDURE — 3079F DIAST BP 80-89 MM HG: CPT | Performed by: INTERNAL MEDICINE

## 2025-08-06 PROCEDURE — 99212 OFFICE O/P EST SF 10 MIN: CPT

## 2025-08-06 ASSESSMENT — LIFESTYLE VARIABLES
HAS A RELATIVE, FRIEND, DOCTOR, OR ANOTHER HEALTH PROFESSIONAL EXPRESSED CONCERN ABOUT YOUR DRINKING OR SUGGESTED YOU CUT DOWN: NO
HOW OFTEN DO YOU HAVE SIX OR MORE DRINKS ON ONE OCCASION: NEVER
AUDIT-C TOTAL SCORE: 0
SKIP TO QUESTIONS 9-10: 1
HOW MANY STANDARD DRINKS CONTAINING ALCOHOL DO YOU HAVE ON A TYPICAL DAY: PATIENT DOES NOT DRINK
HOW OFTEN DO YOU HAVE A DRINK CONTAINING ALCOHOL: NEVER
AUDIT TOTAL SCORE: 0
TOTAL SCORE: 0
HAVE YOU OR SOMEONE ELSE BEEN INJURED AS A RESULT OF YOUR DRINKING: NO

## 2025-08-06 ASSESSMENT — PAIN SCALES - GENERAL: PAINLEVEL_OUTOF10: 0-NO PAIN

## 2025-08-06 ASSESSMENT — ENCOUNTER SYMPTOMS
OCCASIONAL FEELINGS OF UNSTEADINESS: 0
LOSS OF SENSATION IN FEET: 1
DEPRESSION: 0

## 2025-08-06 NOTE — PROGRESS NOTES
[facility]        Subjective   No chief complaint on file.     Here for follow-up of his previous nonischemic cardiomyopathy.  He is angina free with no signs of heart failure.  He  had hospitalization in March 2025 for pulmonary embolism.       He has a past medical history of ADHD (attention deficit hyperactivity disorder) (1966), Allergic, Anxiety (1990), Arrhythmia, Arthritis (2020), CHF (congestive heart failure) (10/22), Depression (1990), Eczema (During sánchez), Fracture of lumbar spine (Multi), H/O supraventricular tachycardia, Headache (1980), Hyperlipidemia, Hypertension, Lung nodule, Obesity, Pneumonia, Pulmonary embolism, Varicella (1964), and Visual impairment (1975).  He has a past surgical history that includes Other surgical history (11/07/2022); MR angio head wo IV contrast (06/21/2017); MR angio neck wo IV contrast (06/21/2017); Cardiac catheterization (03/23); Cardiac electrophysiology study and ablation; [place] tooth extraction (2020); and Circumcision, primary (1960).   No relevant family history has been documented for this patient.  Current Outpatient Medications   Medication Sig Dispense Refill    albuterol (Proventil HFA) 90 mcg/actuation inhaler Inhale 2 puffs every 4 hours if needed for wheezing or shortness of breath. 6.7 g 0    alendronate (Fosamax) 10 mg tablet Take 1 tablet (10 mg) by mouth once daily. 90 tablet 1    apixaban (Eliquis) 5 mg tablet Take 1 tablet (5 mg) by mouth 2 times a day. 60 tablet 2    aspirin 81 mg EC tablet Take 1 tablet (81 mg) by mouth once daily.      buPROPion XL (Wellbutrin XL) 300 mg 24 hr tablet Take 1 tablet (300 mg) by mouth once daily. Do not crush, chew, or split. Total dose 450mg 90 tablet 1    cefuroxime (Ceftin) 500 mg tablet Take 1 tablet (500 mg) by mouth 2 times a day. 8 tablet 0    cholecalciferol (Vitamin D-3) 5,000 Units tablet Take 1 tablet (125 mcg) by mouth once daily.      DULoxetine  (Cymbalta) 30 mg DR capsule Take 1 capsule (30 mg) by mouth 2 times a day. Do not crush or chew.      empagliflozin (Jardiance) 10 mg Take 1 tablet (10 mg) by mouth once daily. 90 tablet 3    Entresto 24-26 mg tablet Take 1 tablet by mouth twice a day 60 tablet 5    gabapentin (Neurontin) 100 mg capsule Take 1 capsule (100 mg) by mouth 3 times a day. 90 capsule 2    ketoconazole (NIZOral) 2 % shampoo Apply topically 2 times a week. Shampoo daily, leave on for 5-10 minutes, then rinse. 120 mL 1    metFORMIN  mg 24 hr tablet Take 1 tablet (500 mg) by mouth once daily in the evening. Take with meals. Do not crush, chew, or split. 90 tablet 3    metoprolol succinate XL (Toprol-XL) 50 mg 24 hr tablet Take 1 tablet (50 mg) by mouth once daily in the evening. Do not crush or chew. 90 tablet 3    [Paused] paliperidone (Invega) 1.5 mg 24 hr tablet Take 1 tablet (1.5 mg) by mouth once daily at bedtime.      primidone (Mysoline) 250 mg tablet Take 1 tablet (250 mg) by mouth once daily at bedtime. 90 tablet 2     No current facility-administered medications for this visit.      reports that he quit smoking about 16 years ago. His smoking use included cigarettes. He started smoking about 55 years ago. He has a 118.2 pack-year smoking history. He has been exposed to tobacco smoke. He has never used smokeless tobacco. He reports that he does not currently use alcohol. He reports that he does not use drugs.  Allergies:  Vibramycin [doxycycline calcium] and Doxycycline    ROS: See HPI  CONSTITUTIONAL: Chills- none. Fever- none. Weight change appropriate for age.  HEENT: Headache- Negative.  Change in vision- none.  Ear pain- none. Nasal congestion- none. Post-nasal drip-none.  Sore throat-none.  CARDIOLOGY: Chest pain- none.  Leg edema-trace.  Murmurs-soft systolic.  Palpitation- none.  RESPIRATORY: Denies any shortness of breath.  GI: Abdominal pain- none.  Change in bowel habits- none.  Constipation- none.  Diarrhea- none.   Nausea- none.  Vomiting- none.  MUSCULOSKELETAL: Joint pain- none.  Muscle aches- none.  DERMATOLOGY: Rash- none.  NEUROLOGY: Dizziness- none.   Headache- none.  PSYCHIATRY: Denies any depression or anxiety     There were no vitals filed for this visit.   BMI:There is no height or weight on file to calculate BMI.   General Cardiology:  General Appearance: Alert, oriented and in no acute distress.  HEENT: extra ocular movements intact (EOMI), pupils equal,  round, reactive to light and accommodation (PERRLA).  Carotid Upstroke: no bruit, normal.  Jugular Venous Distention (JVD): flat.  Chest: normal.  Lungs: Clear to auscultation,   Heart Sounds: no S3 or S4, normal S1, S2, regular rate.  Murmur, Click, Gallop: soft systolic murmur.  Abdomen: no hepatomegaly, no masses felt, soft.  Extremities: no leg edema.  Peripheral pulses: 2 plus bilateral.  NEUROLOGY Cranial nerves II-XII grossly intact.     Last Labs:  CMP:  Recent Labs     05/15/25  1443 04/26/25  0522 04/25/25  0412    132* 134*   K 4.7 5.1 4.4    103 104   CO2 22 21 24   ANIONGAP 9 13 10   BUN 25 16 18   CREATININE 1.22 1.23 1.25   EGFR 66 66 64   GLUCOSE 99 107* 121*     Recent Labs     04/22/25  2214 03/04/25  1217 01/14/25  1535   ALBUMIN 4.1 4.2 4.2   ALKPHOS 71 77 88   ALT 17 21 22   AST 14 19 18   BILITOT 0.4 0.3 0.2     CBC:  Recent Labs     07/01/25  0910 05/15/25  1443 04/26/25  0522   WBC 6.2 6.1 7.3   HGB 14.3 15.6 13.5   HCT 44.9 48.0 43.6    290 236   MCV 88 86.5 89     COAG:   Recent Labs     07/01/25  0910 01/23/23  1552   INR 1.0 1.0     HEME/ENDO:  Recent Labs     03/04/25  1636 01/14/25  1535 02/07/24  0922 11/06/23  0847 11/29/22  1146 11/19/22  1045   FERRITIN  --   --   --   --  177 191   IRONSAT  --   --   --   --  15* 24.5   TSH  --  1.11 0.94  --  1.02 0.76   HGBA1C 5.4 5.4 5.4   < >  --   --     < > = values in this interval not displayed.      CARDIAC:   Recent Labs     04/22/25  8715 04/22/25  5724  03/04/25  1343 03/04/25  1217 03/04/25  1217 02/07/24  0922   TROPHS 6 7 9   < > 9  --    BNP  --  25  --   --  9 14    < > = values in this interval not displayed.     Recent Labs     02/07/24  0922 11/07/23  0552 11/29/22  1146 06/15/21  1013   CHOL 193 187 210* 194   LDLF  --   --  146*  --    LDLCALC 119* 117  --  122   HDL 33.8 30.0* 35.2* 35*   TRIG 203* 200* 143 184*       Last Cardiology Tests:  Echo:  Echo Results:  Transthoracic Echo (TTE) Complete With Contrast 03/05/2025    [place] [address]  Phone [phone]    TRANSTHORACIC ECHOCARDIOGRAM REPORT    Patient Name:       LATHA BARRON      Reading Physician:    [id number] Benjamin Ureña DO  Study Date:         3/5/2025             Ordering Provider:    [id number] ANGELIQUE RODRIGUEZ  MRN/PID:            [id number]             Fellow:  Accession#:         [id number]         Nurse:  Date of Birth/Age:  1960 / 64      Sonographer:          Cornelia kee                                      [facility]  Gender Assigned at  M                    Additional Staff:  Birth:  Height:             190.50 cm            Admit Date:  Weight:             148.78 kg            Admission Status:     Inpatient -  Routine  BSA / BMI:          2.71 m2 / 41.00      Department Location:  [facility]  kg/m2                                      Down  Blood Pressure: 103 /66 mmHg    Study Type:    TRANSTHORACIC ECHO (TTE) COMPLETE  Diagnosis/ICD: Other pulmonary embolism without acute cor pulmonale-I26.99  Indication:    pulmonary embolism  CPT Codes:     Echo Complete w Full Doppler-[id number]    Patient History:  Smoker:            Former.  BMI:               Obese >30  Pertinent History: HTN, Chest Pain, CHF, PE and Cardiomyopathy. bmi 41, chr  pain, tremor, sob, arf.    Study Detail: The following Echo studies were performed: 2D, M-Mode, Doppler and  color flow. Technically challenging study due to poor acoustic  windows,  body habitus, patient lying in supine position and  prominent lung artifact. Definity used as a contrast agent for  endocardial border definition. Total contrast used for this  procedure was 3 mL via IV push.      PHYSICIAN INTERPRETATION:  Left Ventricle: The left ventricular systolic function is low normal, with a visually estimated ejection fraction of 50-55%. There are no regional wall motion abnormalities. The left ventricular cavity size is normal. Left ventricular diastolic filling was not assessed.  Left Atrium: The left atrial size is normal.  Right Ventricle: The right ventricle is normal in size. Unable to determine right ventricular systolic function.  Right Atrium: The right atrial size was not well visualized.  Aortic Valve: The aortic valve is probably trileaflet. The aortic valve dimensionless index is 0.60. There is no evidence of aortic valve regurgitation. The peak instantaneous gradient of the aortic valve is 5 mmHg. The mean gradient of the aortic valve is 2 mmHg.  Mitral Valve: The mitral valve is normal in structure. There is no evidence of mitral valve regurgitation.  Tricuspid Valve: The tricuspid valve was not well visualized. No evidence of tricuspid regurgitation.  Pulmonic Valve: The pulmonic valve is not well visualized. The pulmonic valve regurgitation was not well visualized.  Pericardium: No pericardial effusion noted.  Aorta: The aortic root was not well visualized.      CONCLUSIONS:  1. The left ventricular systolic function is low normal, with a visually estimated ejection fraction of 50-55%.  2. Unable to determine right ventricular systolic function.  3. Technically very difficult study.  4. Right ventricle appeared to be of normal size, unable to assess function.    QUANTITATIVE DATA SUMMARY:    LV SYSTOLIC FUNCTION:  Normal Ranges:  EF-A4C View:    65 % (>=55%)  EF-Visual:      53 %  LV EF Reported: 53 %      LV DIASTOLIC FUNCTION:           Normal Ranges:  MV Peak E:              0.50 m/s  (0.7-1.2 m/s)  MV Peak A:             0.88 m/s  (0.42-0.7 m/s)  E/A Ratio:             0.57      (1.0-2.2)  MV e'                  0.087 m/s (>8.0)  MV lateral e'          0.11 m/s  MV medial e'           0.07 m/s  E/e' Ratio:            5.70      (<8.0)  a'                     0.01 m/s      MITRAL VALVE:          Normal Ranges:  MV DT:        323 msec (150-240msec)      AORTIC VALVE:                     Normal Ranges:  AoV Vmax:                1.09 m/s (<=1.7m/s)  AoV Peak P.8 mmHg (<20mmHg)  AoV Mean P.0 mmHg (1.7-11.5mmHg)  LVOT Max Colin:            0.64 m/s (<=1.1m/s)  AoV VTI:                 16.20 cm (18-25cm)  LVOT VTI:                9.71 cm  LVOT Diameter:           2.00 cm  (1.8-2.4cm)  AoV Area, VTI:           1.88 cm2 (2.5-5.5cm2)  AoV Area,Vmax:           1.84 cm2 (2.5-4.5cm2)  AoV Dimensionless Index: 0.60      RIGHT VENTRICLE:  TAPSE: 17.1 mm  RV s'  0.10 m/s      TRICUSPID VALVE/RVSP:          Normal Ranges:  Peak TR Velocity:     2.81 m/s  RV Syst Pressure:     35 mmHg  (< 30mmHg)      PULMONIC VALVE:          Normal Ranges:  PV Accel Time:  98 msec  (>120ms)  PV Max Colin:     0.7 m/s  (0.6-0.9m/s)  PV Max P.0 mmHg      [id number] Benjamin Ureña DO  Electronically signed on 3/5/2025 at 1:24:33 PM        ** Final **      Transthoracic Echo (TTE) Complete With Contrast 2024    [address] [address]  Phone [phone]    TRANSTHORACIC ECHOCARDIOGRAM REPORT      Patient Name:      LATHA BARRON      Reading Physician:    [id number] Benjamin Ureña DO  Study Date:        4/3/2024             Ordering Provider:    [id number] YULI MATTSON  MRN/PID:           [id number]             Fellow:  Accession#:        [id number]         Nurse:  Date of Birth/Age: 1960 / 63      Sonographer:          Loraine kee  Gender:            M                    Additional Staff:     Yamilex Oswald  ACS,  RDCS, FASE  Height:            185.42 cm            Admit Date:           4/3/2024  Weight:            137.89 kg            Admission Status:     Outpatient  BSA / BMI:         2.57 m2 / 40.11      Department Location:  [facility]  kg/m2  Blood Pressure: 122 /86 mmHg    Study Type:    TRANSTHORACIC ECHO (TTE) COMPLETE  Diagnosis/ICD: Other cardiomyopathies-I42.8  Indication:    cardiomyoapthy  CPT Codes:     Echo Complete w Full Doppler-[id number]    Patient History:  Pertinent History: Heart disease, HTN, HLD, ablation 3-9-2023, AV-claire re-entry  tachy.    Study Detail: The following Echo studies were performed: 2D, M-Mode, Doppler and  color flow. Definity used as a contrast agent for endocardial  border definition. Total contrast used for this procedure was 4 mL  via IV push.      PHYSICIAN INTERPRETATION:  Left Ventricle: Left ventricular systolic function is moderately decreased, with an estimated ejection fraction of 35-40%. There are no regional wall motion abnormalities. The left ventricular cavity size is mildly dilated. Spectral Doppler shows a normal pattern of left ventricular diastolic filling.  Left Atrium: The left atrium is normal in size.  Right Ventricle: The right ventricle is normal in size. There is normal right ventricular global systolic function.  Right Atrium: The right atrium is normal in size.  Aortic Valve: The aortic valve is probably trileaflet. There is no evidence of aortic valve regurgitation. The peak instantaneous gradient of the aortic valve is 4.1 mmHg.  Mitral Valve: The mitral valve is normal in structure. There is trace to mild mitral valve regurgitation.  Tricuspid Valve: The tricuspid valve is structurally normal. No evidence of tricuspid regurgitation.  Pulmonic Valve: The pulmonic valve is structurally normal. There is no indication of pulmonic valve regurgitation.  Pericardium: There is no pericardial effusion noted.  Aorta: The aortic root is  normal.      CONCLUSIONS:  1. Left ventricular systolic function is moderately decreased with a 35-40% estimated ejection fraction.    QUANTITATIVE DATA SUMMARY:  2D MEASUREMENTS:  Normal Ranges:  LAs:           3.35 cm   (2.7-4.0cm)  IVSd:          0.80 cm   (0.6-1.1cm)  LVPWd:         0.92 cm   (0.6-1.1cm)  LVIDd:         5.05 cm   (3.9-5.9cm)  LVIDs:         3.34 cm  LV Mass Index: 58.8 g/m2  LV % FS        33.8 %    LA VOLUME:  Normal Ranges:  LA Vol A4C:        52.5 ml    (22+/-6mL/m2)  LA Vol A2C:        52.9 ml  LA Vol BP:         53.6 ml  LA Vol Index A4C:  20.4ml/m2  LA Vol Index A2C:  20.6 ml/m2  LA Vol Index BP:   20.9 ml/m2  LA Area A4C:       18.6 cm2  LA Area A2C:       19.0 cm2  LA Major Axis A4C: 5.6 cm  LA Major Axis A2C: 5.8 cm  LA Volume Index:   20.9 ml/m2  LA Vol A4C:        52.0 ml  LA Vol A2C:        53.0 ml    RA VOLUME BY A/L METHOD:  Normal Ranges:  RA Vol A4C:        45.4 ml    (8.3-19.5ml)  RA Vol Index A4C:  17.7 ml/m2  RA Area A4C:       15.5 cm2  RA Major Axis A4C: 4.5 cm    M-MODE MEASUREMENTS:  Normal Ranges:  LAs: 3.22 cm (2.7-4.0cm)    LV SYSTOLIC FUNCTION BY 2D PLANIMETRY (MOD):  Normal Ranges:  EF-A4C View: 39.4 % (>=55%)  EF-A2C View: 43.2 %    LV DIASTOLIC FUNCTION:  Normal Ranges:  MV Peak E:        0.42 m/s   (0.7-1.2 m/s)  MV Peak A:        0.80 m/s   (0.42-0.7 m/s)  E/A Ratio:        0.53       (1.0-2.2)  MV e'             0.07 m/s   (>8.0)  MV lateral e'     0.09 m/s  MV medial e'      0.05 m/s  E/e' Ratio:       6.03       (<8.0)  PulmV Sys Colin:    47.11 cm/s  PulmV Warren Colin:   30.70 cm/s  PulmV S/D Colin:    1.53  PulmV A Revs Colin: 17.68 cm/s    MITRAL VALVE:  Normal Ranges:  MV DT: 200 msec (150-240msec)    AORTIC VALVE:  Normal Ranges:  AoV Vmax:      1.02 m/s (<=1.7m/s)  AoV Peak P.1 mmHg (<20mmHg)  LVOT Max Colin:  0.85 m/s (<=1.1m/s)  LVOT Diameter: 2.24 cm  (1.8-2.4cm)  AoV Area,Vmax: 3.09 cm2 (2.5-4.5cm2)      RIGHT VENTRICLE:  TAPSE: 13.9 mm  RV s'  0.13  m/s    TRICUSPID VALVE/RVSP:  Normal Ranges:  IVC Diam: 1.87 cm    Pulmonary Veins:  PulmV A Revs Colin: 17.68 cm/s  PulmV Warren Colin:   30.70 cm/s  PulmV S/D Colin:    1.53  PulmV Sys Colin:    47.11 cm/s    AORTA:  Asc Ao Diam 2.97 cm      [id number] Benjamin Ureña DO  Electronically signed on 4/3/2024 at 11:40:14 AM        ** Final **      Transthoracic Echo (TTE) Complete With Contrast 11/07/2023    [place] [address]  Phone [phone]    TRANSTHORACIC ECHOCARDIOGRAM REPORT      Patient Name:      LATHA BARRON       Reading Physician:    [id number] Boss DO  Study Date:        11/7/2023             Ordering Provider:    [id number] REX GONZALEZ  MRN/PID:           [id number]              Fellow:  Accession#:        [id number]          Nurse:  Date of Birth/Age: 1960 / 63 years Sonographer:          Yamilex Oswald  ACS, RDCS, BRANDY  Gender:            M                     Additional Staff:  Height:            190.50 cm             Admit Date:  Weight:            140.62 kg             Admission Status:  BSA:               2.64 m2               Department Location:  [facility]  Blood Pressure: 131 /88 mmHg    Study Type:    TRANSTHORACIC ECHO (TTE) COMPLETE  Diagnosis/ICD: Heart failure, unspecified-I50.9  Indication:    Heart Failure  CPT Codes:     Echo Complete w Full Doppler-[id number]    Patient History:  Pertinent History: Htn, Hld, Ablation 3/9/23, AV claire Re-entry Tachycardia.    Study Detail: The following Echo studies were performed: 2D, M-Mode, Doppler and  color flow. Technically challenging study due to poor acoustic  windows and body habitus. Lumason used as a contrast agent for  endocardial border definition. Total contrast used for this  procedure was 5 mL via IV push.      PHYSICIAN INTERPRETATION:  Left Ventricle: Left ventricular systolic function is moderately decreased, with an estimated ejection fraction of 40-45%. Wall motion is  abnormal. The left ventricular cavity size is normal. Spectral Doppler shows an impaired relaxation pattern of left ventricular diastolic filling. Abnormal septal motion consistent with IVCD.  Left Atrium: The left atrium is normal in size.  Right Ventricle: The right ventricle is normal in size. There is normal right ventricular global systolic function.  Right Atrium: The right atrium is normal in size.  Aortic Valve: The aortic valve appears structurally normal. There is no evidence of aortic valve regurgitation. The peak instantaneous gradient of the aortic valve is 4.1 mmHg.  Mitral Valve: The mitral valve is normal in structure. There is no evidence of mitral valve regurgitation.  Tricuspid Valve: The tricuspid valve is structurally normal. No evidence of tricuspid regurgitation.  Pulmonic Valve: The pulmonic valve is structurally normal. There is no indication of pulmonic valve regurgitation.  Pericardium: There is no pericardial effusion noted.  Aorta: The aortic root is normal.      CONCLUSIONS:  1. Left ventricular systolic function is moderately decreased with a 40-45% estimated ejection fraction.  2. Spectral Doppler shows an impaired relaxation pattern of left ventricular diastolic filling.    QUANTITATIVE DATA SUMMARY:  2D MEASUREMENTS:  Normal Ranges:  IVSd:          1.26 cm   (0.6-1.1cm)  LVPWd:         0.97 cm   (0.6-1.1cm)  LVIDd:         5.18 cm   (3.9-5.9cm)  LVIDs:         3.90 cm  LV Mass Index: 84.5 g/m2  LV % FS        24.7 %    LA VOLUME:  Normal Ranges:  LA Vol A4C:        76.3 ml   (22+/-6mL/m2)  LA Vol Index A4C:  28.9ml/m2  LA Area A4C:       22.6 cm2  LA Major Axis A4C: 5.7 cm  LA Vol A4C:        71.4 ml    AORTA MEASUREMENTS:  Normal Ranges:  Asc Ao, d: 3.40 cm (2.1-3.4cm)    LV SYSTOLIC FUNCTION BY 2D PLANIMETRY (MOD):  Normal Ranges:  EF-A4C View: 48.2 % (>=55%)  EF-A2C View: 43.9 %  EF-Biplane:  43.9 %    LV DIASTOLIC FUNCTION:  Normal Ranges:  MV Peak E:    0.49 m/s (0.7-1.2  m/s)  MV Peak A:    0.88 m/s (0.42-0.7 m/s)  E/A Ratio:    0.56     (1.0-2.2)  MV e'         0.05 m/s (>8.0)  MV lateral e' 0.06 m/s  MV medial e'  0.04 m/s  E/e' Ratio:   9.86     (<8.0)    MITRAL VALVE:  Normal Ranges:  MV DT: 262 msec (150-240msec)    AORTIC VALVE:  Normal Ranges:  AoV Vmax:     1.01 m/s (<=1.7m/s)  AoV Peak P.1 mmHg (<20mmHg)  LVOT Max Colin: 0.57 m/s (<=1.1m/s)  LVOT VTI:     11.50 cm      RIGHT VENTRICLE:  TAPSE: 20.4 mm  RV s'  0.11 m/s      [id number] Walt Isabelkeyanna SAUCEDA  Electronically signed on 2023 at 10:48:27 AM        ** Final **     Cath:  Stress Test:  Stress Results:  No results found for this or any previous visit from the past 365 days.     Cardiac Imaging:    Problem List Items Addressed This Visit       Systolic heart failure    On guideline directed medical therapy with no signs of volume overload         HTN (hypertension), benign    Managed on present medication         Cardiomyopathy - Primary    After treatment with guideline directed medical therapy with metoprolol and Entresto and Jardiance, his cardiomyopathy improved and his echocardiogram 2025 now shows left ventricular ejection fraction 55%, with RV size normal but in a technically difficult study the RV function was not easily estimated.          HTN (hypertension), benign         Pulmonary embolism without acute cor pulmonale    No evidence of recurrent pulmonary embolism.  Right heart function stable on echocardiography           Ran out of his Eliquis after about 3 to 4 months and has not been on Eliquis since that time but he really should have been on Eliquis for at least a year because of his pulmonary embolism so I am putting him back on his Eliquis and he is going to follow-up with me in 3 months.      Pt. care time is spent includes review of diagnostic tests, labs, radiographs, EKGs, old echoes, cardiac work-up and coordination of care. Assessment, impression and plans are reflected in the note above as  well as the orders.    Walt Boss DO  [facility]  [facility]

## 2025-08-06 NOTE — ASSESSMENT & PLAN NOTE
After treatment with guideline directed medical therapy with metoprolol and Entresto and Jardiance, his cardiomyopathy improved and his echocardiogram March 2025 now shows left ventricular ejection fraction 55%, with RV size normal but in a technically difficult study the RV function was not easily estimated.          HTN (hypertension), benign

## 2025-08-12 ENCOUNTER — APPOINTMENT (OUTPATIENT)
Dept: PHYSICAL THERAPY | Facility: CLINIC | Age: 65
End: 2025-08-12
Payer: MEDICAID

## 2025-08-12 ENCOUNTER — DOCUMENTATION (OUTPATIENT)
Dept: PHYSICAL THERAPY | Facility: CLINIC | Age: 65
End: 2025-08-12
Payer: MEDICAID

## 2025-08-19 ENCOUNTER — DOCUMENTATION (OUTPATIENT)
Dept: PHYSICAL THERAPY | Facility: CLINIC | Age: 65
End: 2025-08-19
Payer: MEDICAID

## 2025-08-28 ENCOUNTER — HOSPITAL ENCOUNTER (OUTPATIENT)
Facility: CLINIC | Age: 65
Discharge: HOME | End: 2025-08-28
Payer: MEDICAID

## 2025-08-28 DIAGNOSIS — R20.2 PINS AND NEEDLES SENSATION: ICD-10-CM

## 2025-08-28 DIAGNOSIS — G62.89 OTHER POLYNEUROPATHY: ICD-10-CM

## 2025-08-28 PROCEDURE — 95912 NRV CNDJ TEST 11-12 STUDIES: CPT | Performed by: STUDENT IN AN ORGANIZED HEALTH CARE EDUCATION/TRAINING PROGRAM

## 2025-08-28 PROCEDURE — 95886 MUSC TEST DONE W/N TEST COMP: CPT | Performed by: STUDENT IN AN ORGANIZED HEALTH CARE EDUCATION/TRAINING PROGRAM

## 2025-08-29 DIAGNOSIS — G62.89 OTHER POLYNEUROPATHY: Primary | ICD-10-CM

## 2025-09-09 ENCOUNTER — APPOINTMENT (OUTPATIENT)
Dept: ORTHOPEDIC SURGERY | Facility: CLINIC | Age: 65
End: 2025-09-09
Payer: MEDICAID

## 2025-10-14 ENCOUNTER — APPOINTMENT (OUTPATIENT)
Dept: NEUROLOGY | Facility: CLINIC | Age: 65
End: 2025-10-14
Payer: MEDICAID

## 2025-10-28 ENCOUNTER — APPOINTMENT (OUTPATIENT)
Dept: NEUROLOGY | Facility: CLINIC | Age: 65
End: 2025-10-28
Payer: MEDICAID